# Patient Record
Sex: MALE | Race: WHITE | NOT HISPANIC OR LATINO | Employment: FULL TIME | URBAN - METROPOLITAN AREA
[De-identification: names, ages, dates, MRNs, and addresses within clinical notes are randomized per-mention and may not be internally consistent; named-entity substitution may affect disease eponyms.]

---

## 2017-06-02 DIAGNOSIS — Z01.812 PRE-OPERATIVE LABORATORY EXAMINATION: ICD-10-CM

## 2017-06-02 PROCEDURE — 87640 STAPH A DNA AMP PROBE: CPT | Mod: 59

## 2017-06-02 PROCEDURE — 87641 MR-STAPH DNA AMP PROBE: CPT

## 2017-06-02 RX ORDER — ALPRAZOLAM 0.25 MG/1
0.25 TABLET ORAL
COMMUNITY

## 2017-06-02 RX ORDER — SIMVASTATIN 40 MG
40 TABLET ORAL NIGHTLY
COMMUNITY
End: 2020-01-01

## 2017-06-02 RX ORDER — MELOXICAM 15 MG/1
15 TABLET ORAL DAILY
COMMUNITY
End: 2020-01-01

## 2017-06-02 RX ORDER — OMEPRAZOLE 20 MG/1
20 CAPSULE, DELAYED RELEASE ORAL DAILY
COMMUNITY
End: 2021-01-01

## 2017-06-02 RX ORDER — LISINOPRIL AND HYDROCHLOROTHIAZIDE 12.5; 1 MG/1; MG/1
1 TABLET ORAL DAILY
COMMUNITY
End: 2021-01-01

## 2017-06-02 RX ORDER — TRAMADOL HYDROCHLORIDE 50 MG/1
50 TABLET ORAL EVERY EVENING
Status: ON HOLD | COMMUNITY
End: 2020-01-01

## 2017-06-02 NOTE — DISCHARGE PLANNING
"DISCHARGE PLANNING NOTE - TOTAL JOINT     Procedure: Procedure(s):  HIP ARTHROPLASTY TOTAL  Procedure Date: 6/12/2017  Insurance:  Payor: MEDICARE / Plan: MEDICARE PART A & B / Product Type: *No Product type* /   Equipment currently available at home? front-wheel walker and raised toilet seat  Steps into the home? 2  Steps within the home? 0  Toilet height? Standard  Type of shower? walk-in shower  Who will be with you during your recovery? spouse  Is Outpatient Physical Therapy set up after surgery? Yes   Did you take the Total Joint Class and where? Yes, at [facility]     Plan: Met \"Raymon\" at pre-admit apt.  Has had other hip replaced, and did well.  He is working on choosing a PT facility, and will decide by 6/7/17.  Wife will be caregiver.  Has grab bars, and does not feel need for shower chair.  No further DME needs identified.     "

## 2017-06-03 LAB
SCCMEC + MECA PNL NOSE NAA+PROBE: NEGATIVE
SCCMEC + MECA PNL NOSE NAA+PROBE: POSITIVE

## 2017-06-12 ENCOUNTER — HOSPITAL ENCOUNTER (INPATIENT)
Facility: MEDICAL CENTER | Age: 77
LOS: 1 days | DRG: 470 | End: 2017-06-13
Attending: ORTHOPAEDIC SURGERY | Admitting: ORTHOPAEDIC SURGERY
Payer: COMMERCIAL

## 2017-06-12 ENCOUNTER — APPOINTMENT (OUTPATIENT)
Dept: RADIOLOGY | Facility: MEDICAL CENTER | Age: 77
DRG: 470 | End: 2017-06-12
Attending: ORTHOPAEDIC SURGERY
Payer: COMMERCIAL

## 2017-06-12 PROBLEM — M16.12 PRIMARY OSTEOARTHRITIS OF LEFT HIP: Status: ACTIVE | Noted: 2017-06-12

## 2017-06-12 PROCEDURE — 700111 HCHG RX REV CODE 636 W/ 250 OVERRIDE (IP)

## 2017-06-12 PROCEDURE — 700102 HCHG RX REV CODE 250 W/ 637 OVERRIDE(OP): Performed by: ORTHOPAEDIC SURGERY

## 2017-06-12 PROCEDURE — 700105 HCHG RX REV CODE 258: Performed by: ORTHOPAEDIC SURGERY

## 2017-06-12 PROCEDURE — 502578 HCHG PACK, TOTAL HIP: Performed by: ORTHOPAEDIC SURGERY

## 2017-06-12 PROCEDURE — 700112 HCHG RX REV CODE 229: Performed by: ORTHOPAEDIC SURGERY

## 2017-06-12 PROCEDURE — 160009 HCHG ANES TIME/MIN: Performed by: ORTHOPAEDIC SURGERY

## 2017-06-12 PROCEDURE — 160042 HCHG SURGERY MINUTES - EA ADDL 1 MIN LEVEL 5: Performed by: ORTHOPAEDIC SURGERY

## 2017-06-12 PROCEDURE — L1830 KO IMMOB CANVAS LONG PRE OTS: HCPCS | Performed by: ORTHOPAEDIC SURGERY

## 2017-06-12 PROCEDURE — 302135 SEQUENTIAL COMPRESSION MACHINE: Performed by: ORTHOPAEDIC SURGERY

## 2017-06-12 PROCEDURE — 700101 HCHG RX REV CODE 250

## 2017-06-12 PROCEDURE — 500087 HCHG BLADE, RECIPROCATING: Performed by: ORTHOPAEDIC SURGERY

## 2017-06-12 PROCEDURE — 501487 HCHG STRYKER TIP: Performed by: ORTHOPAEDIC SURGERY

## 2017-06-12 PROCEDURE — 501486 HCHG STRYKER IRRIG SET HC W/TUBING: Performed by: ORTHOPAEDIC SURGERY

## 2017-06-12 PROCEDURE — 770001 HCHG ROOM/CARE - MED/SURG/GYN PRIV*

## 2017-06-12 PROCEDURE — A9270 NON-COVERED ITEM OR SERVICE: HCPCS | Performed by: ORTHOPAEDIC SURGERY

## 2017-06-12 PROCEDURE — 72170 X-RAY EXAM OF PELVIS: CPT

## 2017-06-12 PROCEDURE — 160036 HCHG PACU - EA ADDL 30 MINS PHASE I: Performed by: ORTHOPAEDIC SURGERY

## 2017-06-12 PROCEDURE — 160031 HCHG SURGERY MINUTES - 1ST 30 MINS LEVEL 5: Performed by: ORTHOPAEDIC SURGERY

## 2017-06-12 PROCEDURE — 500864 HCHG NEEDLE, SPINAL 18G: Performed by: ORTHOPAEDIC SURGERY

## 2017-06-12 PROCEDURE — 502240 HCHG MISC OR SUPPLY RC 0272: Performed by: ORTHOPAEDIC SURGERY

## 2017-06-12 PROCEDURE — A9270 NON-COVERED ITEM OR SERVICE: HCPCS

## 2017-06-12 PROCEDURE — 0SRB02A REPLACEMENT OF LEFT HIP JOINT WITH METAL ON POLYETHYLENE SYNTHETIC SUBSTITUTE, UNCEMENTED, OPEN APPROACH: ICD-10-PCS | Performed by: ORTHOPAEDIC SURGERY

## 2017-06-12 PROCEDURE — 502000 HCHG MISC OR IMPLANTS RC 0278: Performed by: ORTHOPAEDIC SURGERY

## 2017-06-12 PROCEDURE — 160002 HCHG RECOVERY MINUTES (STAT): Performed by: ORTHOPAEDIC SURGERY

## 2017-06-12 PROCEDURE — 160048 HCHG OR STATISTICAL LEVEL 1-5: Performed by: ORTHOPAEDIC SURGERY

## 2017-06-12 PROCEDURE — A6223 GAUZE >16<=48 NO W/SAL W/O B: HCPCS | Performed by: ORTHOPAEDIC SURGERY

## 2017-06-12 PROCEDURE — 700102 HCHG RX REV CODE 250 W/ 637 OVERRIDE(OP)

## 2017-06-12 PROCEDURE — 94760 N-INVAS EAR/PLS OXIMETRY 1: CPT

## 2017-06-12 PROCEDURE — 160035 HCHG PACU - 1ST 60 MINS PHASE I: Performed by: ORTHOPAEDIC SURGERY

## 2017-06-12 PROCEDURE — 500811 HCHG LENS/HOOD FOR SPACESUIT: Performed by: ORTHOPAEDIC SURGERY

## 2017-06-12 PROCEDURE — 501838 HCHG SUTURE GENERAL: Performed by: ORTHOPAEDIC SURGERY

## 2017-06-12 PROCEDURE — 700111 HCHG RX REV CODE 636 W/ 250 OVERRIDE (IP): Performed by: ORTHOPAEDIC SURGERY

## 2017-06-12 DEVICE — IMPLANTABLE DEVICE: Type: IMPLANTABLE DEVICE | Site: HIP | Status: FUNCTIONAL

## 2017-06-12 RX ORDER — DOCUSATE SODIUM 100 MG/1
100 CAPSULE, LIQUID FILLED ORAL 2 TIMES DAILY
Status: DISCONTINUED | OUTPATIENT
Start: 2017-06-12 | End: 2017-06-13 | Stop reason: HOSPADM

## 2017-06-12 RX ORDER — ROPIVACAINE HYDROCHLORIDE 5 MG/ML
INJECTION, SOLUTION EPIDURAL; INFILTRATION; PERINEURAL
Status: DISCONTINUED | OUTPATIENT
Start: 2017-06-12 | End: 2017-06-12 | Stop reason: HOSPADM

## 2017-06-12 RX ORDER — LISINOPRIL AND HYDROCHLOROTHIAZIDE 12.5; 1 MG/1; MG/1
1 TABLET ORAL DAILY
Status: DISCONTINUED | OUTPATIENT
Start: 2017-06-12 | End: 2017-06-12

## 2017-06-12 RX ORDER — HALOPERIDOL 5 MG/ML
1 INJECTION INTRAMUSCULAR EVERY 6 HOURS PRN
Status: DISCONTINUED | OUTPATIENT
Start: 2017-06-12 | End: 2017-06-13 | Stop reason: HOSPADM

## 2017-06-12 RX ORDER — LISINOPRIL 5 MG/1
10 TABLET ORAL
Status: DISCONTINUED | OUTPATIENT
Start: 2017-06-12 | End: 2017-06-13 | Stop reason: HOSPADM

## 2017-06-12 RX ORDER — ACETAMINOPHEN 500 MG
TABLET ORAL
Status: COMPLETED
Start: 2017-06-12 | End: 2017-06-12

## 2017-06-12 RX ORDER — SODIUM CHLORIDE, SODIUM LACTATE, POTASSIUM CHLORIDE, CALCIUM CHLORIDE 600; 310; 30; 20 MG/100ML; MG/100ML; MG/100ML; MG/100ML
1000 INJECTION, SOLUTION INTRAVENOUS
Status: DISCONTINUED | OUTPATIENT
Start: 2017-06-12 | End: 2017-06-13 | Stop reason: HOSPADM

## 2017-06-12 RX ORDER — OXYCODONE HYDROCHLORIDE 10 MG/1
10 TABLET ORAL
Status: DISCONTINUED | OUTPATIENT
Start: 2017-06-12 | End: 2017-06-13 | Stop reason: HOSPADM

## 2017-06-12 RX ORDER — ONDANSETRON 2 MG/ML
4 INJECTION INTRAMUSCULAR; INTRAVENOUS EVERY 4 HOURS PRN
Status: DISCONTINUED | OUTPATIENT
Start: 2017-06-12 | End: 2017-06-13 | Stop reason: HOSPADM

## 2017-06-12 RX ORDER — DEXTROSE, SODIUM CHLORIDE, SODIUM LACTATE, POTASSIUM CHLORIDE, AND CALCIUM CHLORIDE 5; .6; .31; .03; .02 G/100ML; G/100ML; G/100ML; G/100ML; G/100ML
INJECTION, SOLUTION INTRAVENOUS CONTINUOUS
Status: DISCONTINUED | OUTPATIENT
Start: 2017-06-12 | End: 2017-06-13 | Stop reason: HOSPADM

## 2017-06-12 RX ORDER — HYDROCHLOROTHIAZIDE 12.5 MG/1
12.5 CAPSULE, GELATIN COATED ORAL
Status: DISCONTINUED | OUTPATIENT
Start: 2017-06-12 | End: 2017-06-13 | Stop reason: HOSPADM

## 2017-06-12 RX ORDER — OMEPRAZOLE 20 MG/1
20 CAPSULE, DELAYED RELEASE ORAL DAILY
Status: DISCONTINUED | OUTPATIENT
Start: 2017-06-12 | End: 2017-06-13 | Stop reason: HOSPADM

## 2017-06-12 RX ORDER — SIMVASTATIN 20 MG
40 TABLET ORAL NIGHTLY
Status: DISCONTINUED | OUTPATIENT
Start: 2017-06-12 | End: 2017-06-13 | Stop reason: HOSPADM

## 2017-06-12 RX ORDER — OXYCODONE HCL 5 MG/5 ML
SOLUTION, ORAL ORAL
Status: COMPLETED
Start: 2017-06-12 | End: 2017-06-12

## 2017-06-12 RX ORDER — DEXAMETHASONE SODIUM PHOSPHATE 4 MG/ML
4 INJECTION, SOLUTION INTRA-ARTICULAR; INTRALESIONAL; INTRAMUSCULAR; INTRAVENOUS; SOFT TISSUE
Status: DISCONTINUED | OUTPATIENT
Start: 2017-06-12 | End: 2017-06-13 | Stop reason: HOSPADM

## 2017-06-12 RX ORDER — AMOXICILLIN 250 MG
1 CAPSULE ORAL
Status: DISCONTINUED | OUTPATIENT
Start: 2017-06-12 | End: 2017-06-13 | Stop reason: HOSPADM

## 2017-06-12 RX ORDER — LIDOCAINE HYDROCHLORIDE 10 MG/ML
INJECTION, SOLUTION INFILTRATION; PERINEURAL
Status: COMPLETED
Start: 2017-06-12 | End: 2017-06-12

## 2017-06-12 RX ORDER — AMOXICILLIN 250 MG
1 CAPSULE ORAL NIGHTLY
Status: DISCONTINUED | OUTPATIENT
Start: 2017-06-12 | End: 2017-06-13 | Stop reason: HOSPADM

## 2017-06-12 RX ORDER — DIPHENHYDRAMINE HYDROCHLORIDE 50 MG/ML
25 INJECTION INTRAMUSCULAR; INTRAVENOUS EVERY 6 HOURS PRN
Status: DISCONTINUED | OUTPATIENT
Start: 2017-06-12 | End: 2017-06-13 | Stop reason: HOSPADM

## 2017-06-12 RX ORDER — POLYETHYLENE GLYCOL 3350 17 G/17G
1 POWDER, FOR SOLUTION ORAL 2 TIMES DAILY PRN
Status: DISCONTINUED | OUTPATIENT
Start: 2017-06-12 | End: 2017-06-13 | Stop reason: HOSPADM

## 2017-06-12 RX ORDER — MEPERIDINE HYDROCHLORIDE 25 MG/ML
INJECTION INTRAMUSCULAR; INTRAVENOUS; SUBCUTANEOUS
Status: COMPLETED
Start: 2017-06-12 | End: 2017-06-12

## 2017-06-12 RX ORDER — TRANEXAMIC ACID 100 MG/ML
INJECTION, SOLUTION INTRAVENOUS
Status: DISPENSED
Start: 2017-06-12 | End: 2017-06-13

## 2017-06-12 RX ORDER — ACETAMINOPHEN 500 MG
1000 TABLET ORAL EVERY 6 HOURS
Status: DISCONTINUED | OUTPATIENT
Start: 2017-06-12 | End: 2017-06-13 | Stop reason: HOSPADM

## 2017-06-12 RX ORDER — CELECOXIB 200 MG/1
CAPSULE ORAL
Status: COMPLETED
Start: 2017-06-12 | End: 2017-06-12

## 2017-06-12 RX ORDER — OXYCODONE HYDROCHLORIDE 5 MG/1
5 TABLET ORAL
Status: DISCONTINUED | OUTPATIENT
Start: 2017-06-12 | End: 2017-06-13 | Stop reason: HOSPADM

## 2017-06-12 RX ORDER — EPINEPHRINE 1 MG/ML(1)
AMPUL (ML) INJECTION
Status: DISCONTINUED | OUTPATIENT
Start: 2017-06-12 | End: 2017-06-12 | Stop reason: HOSPADM

## 2017-06-12 RX ORDER — CHOLECALCIFEROL (VITAMIN D3) 125 MCG
CAPSULE ORAL
COMMUNITY
End: 2021-01-01

## 2017-06-12 RX ORDER — SCOLOPAMINE TRANSDERMAL SYSTEM 1 MG/1
1 PATCH, EXTENDED RELEASE TRANSDERMAL
Status: DISCONTINUED | OUTPATIENT
Start: 2017-06-12 | End: 2017-06-13 | Stop reason: HOSPADM

## 2017-06-12 RX ORDER — BISACODYL 10 MG
10 SUPPOSITORY, RECTAL RECTAL
Status: DISCONTINUED | OUTPATIENT
Start: 2017-06-12 | End: 2017-06-13 | Stop reason: HOSPADM

## 2017-06-12 RX ORDER — ENEMA 19; 7 G/133ML; G/133ML
1 ENEMA RECTAL
Status: DISCONTINUED | OUTPATIENT
Start: 2017-06-12 | End: 2017-06-13 | Stop reason: HOSPADM

## 2017-06-12 RX ORDER — KETOROLAC TROMETHAMINE 30 MG/ML
15 INJECTION, SOLUTION INTRAMUSCULAR; INTRAVENOUS EVERY 6 HOURS
Status: DISCONTINUED | OUTPATIENT
Start: 2017-06-12 | End: 2017-06-13 | Stop reason: HOSPADM

## 2017-06-12 RX ORDER — KETOROLAC TROMETHAMINE 30 MG/ML
INJECTION, SOLUTION INTRAMUSCULAR; INTRAVENOUS
Status: DISCONTINUED | OUTPATIENT
Start: 2017-06-12 | End: 2017-06-12 | Stop reason: HOSPADM

## 2017-06-12 RX ORDER — ALPRAZOLAM 0.25 MG/1
0.25 TABLET ORAL NIGHTLY PRN
Status: DISCONTINUED | OUTPATIENT
Start: 2017-06-12 | End: 2017-06-13 | Stop reason: HOSPADM

## 2017-06-12 RX ORDER — CELECOXIB 200 MG/1
200 CAPSULE ORAL 2 TIMES DAILY WITH MEALS
Status: DISCONTINUED | OUTPATIENT
Start: 2017-06-13 | End: 2017-06-13 | Stop reason: HOSPADM

## 2017-06-12 RX ADMIN — MEPERIDINE HYDROCHLORIDE 12.5 MG: 25 INJECTION INTRAMUSCULAR; INTRAVENOUS; SUBCUTANEOUS at 15:55

## 2017-06-12 RX ADMIN — KETOROLAC TROMETHAMINE 15 MG: 30 INJECTION, SOLUTION INTRAMUSCULAR at 20:42

## 2017-06-12 RX ADMIN — FENTANYL CITRATE 25 MCG: 50 INJECTION, SOLUTION INTRAMUSCULAR; INTRAVENOUS at 16:25

## 2017-06-12 RX ADMIN — FENTANYL CITRATE 25 MCG: 50 INJECTION, SOLUTION INTRAMUSCULAR; INTRAVENOUS at 17:00

## 2017-06-12 RX ADMIN — LIDOCAINE HYDROCHLORIDE 0.1 ML: 10 INJECTION, SOLUTION INFILTRATION; PERINEURAL at 13:26

## 2017-06-12 RX ADMIN — SODIUM CHLORIDE 2 G: 9 INJECTION, SOLUTION INTRAVENOUS at 20:49

## 2017-06-12 RX ADMIN — ACETAMINOPHEN 1000 MG: 500 TABLET, COATED ORAL at 12:55

## 2017-06-12 RX ADMIN — Medication 1 TABLET: at 20:42

## 2017-06-12 RX ADMIN — LISINOPRIL 10 MG: 5 TABLET ORAL at 17:58

## 2017-06-12 RX ADMIN — SODIUM CHLORIDE, SODIUM LACTATE, POTASSIUM CHLORIDE, CALCIUM CHLORIDE AND DEXTROSE MONOHYDRATE: 5; 600; 310; 30; 20 INJECTION, SOLUTION INTRAVENOUS at 16:31

## 2017-06-12 RX ADMIN — CELECOXIB 400 MG: 200 CAPSULE ORAL at 12:55

## 2017-06-12 RX ADMIN — HYDROCHLOROTHIAZIDE 12.5 MG: 12.5 CAPSULE ORAL at 17:58

## 2017-06-12 RX ADMIN — SIMVASTATIN 40 MG: 20 TABLET, FILM COATED ORAL at 20:42

## 2017-06-12 RX ADMIN — FENTANYL CITRATE 25 MCG: 50 INJECTION, SOLUTION INTRAMUSCULAR; INTRAVENOUS at 16:15

## 2017-06-12 RX ADMIN — SODIUM CHLORIDE, SODIUM LACTATE, POTASSIUM CHLORIDE, CALCIUM CHLORIDE 1000 ML: 600; 310; 30; 20 INJECTION, SOLUTION INTRAVENOUS at 13:26

## 2017-06-12 RX ADMIN — OXYCODONE HYDROCHLORIDE 10 MG: 5 SOLUTION ORAL at 16:30

## 2017-06-12 RX ADMIN — FENTANYL CITRATE 25 MCG: 50 INJECTION, SOLUTION INTRAMUSCULAR; INTRAVENOUS at 16:35

## 2017-06-12 RX ADMIN — DOCUSATE SODIUM 100 MG: 100 CAPSULE, LIQUID FILLED ORAL at 20:42

## 2017-06-12 RX ADMIN — OMEPRAZOLE 20 MG: 20 CAPSULE, DELAYED RELEASE ORAL at 17:59

## 2017-06-12 ASSESSMENT — PAIN SCALES - GENERAL
PAINLEVEL_OUTOF10: 3
PAINLEVEL_OUTOF10: 4
PAINLEVEL_OUTOF10: 5
PAINLEVEL_OUTOF10: 5
PAINLEVEL_OUTOF10: 0
PAINLEVEL_OUTOF10: 0
PAINLEVEL_OUTOF10: 6
PAINLEVEL_OUTOF10: 3

## 2017-06-12 ASSESSMENT — LIFESTYLE VARIABLES
EVER FELT BAD OR GUILTY ABOUT YOUR DRINKING: NO
CONSUMPTION TOTAL: NEGATIVE
EVER_SMOKED: NEVER
DO YOU DRINK ALCOHOL: YES
HOW MANY TIMES IN THE PAST YEAR HAVE YOU HAD 5 OR MORE DRINKS IN A DAY: 0
HAVE PEOPLE ANNOYED YOU BY CRITICIZING YOUR DRINKING: NO
TOTAL SCORE: 0
AVERAGE NUMBER OF DAYS PER WEEK YOU HAVE A DRINK CONTAINING ALCOHOL: 7
HAVE YOU EVER FELT YOU SHOULD CUT DOWN ON YOUR DRINKING: NO
EVER_SMOKED: NEVER
TOTAL SCORE: 0
ON A TYPICAL DAY WHEN YOU DRINK ALCOHOL HOW MANY DRINKS DO YOU HAVE: 1
EVER HAD A DRINK FIRST THING IN THE MORNING TO STEADY YOUR NERVES TO GET RID OF A HANGOVER: NO
TOTAL SCORE: 0

## 2017-06-12 NOTE — IP AVS SNAPSHOT
6/13/2017    Adithya Cohn  [address] [place] [address]    Dear Adithya:    [facility] wants to ensure your discharge home is safe and you or your loved ones have had all of your questions answered regarding your care after you leave the hospital.    Below is a list of resources and contact information should you have any questions regarding your hospital stay, follow-up instructions, or active medical symptoms.    Questions or Concerns Regarding… Contact   Medical Questions Related to Your Discharge  (7 days a week, 8am-5pm) Contact a Nurse Care Coordinator   [phone]   Medical Questions Not Related to Your Discharge  (24 hours a day / 7 days a week)  Contact the Nurse Health Line   [phone]    Medications or Discharge Instructions Refer to your discharge packet   or contact your [facility] Primary Care Provider   [phone]   Follow-up Appointment(s) Schedule your appointment via [employer]   or contact Scheduling [phone]   Billing Review your statement via [employer]  or contact Billing [phone]   Medical Records Review your records via [employer]   or contact Medical Records [phone]     You may receive a telephone call within two days of discharge. This call is to make certain you understand your discharge instructions and have the opportunity to have any questions answered. You can also easily access your medical information, test results and upcoming appointments via the [employer] free online health management tool. You can learn more and sign up at [email or web address]. For assistance setting up your [employer] account, please call [phone].    Once again, we want to ensure your discharge home is safe and that you have a clear understanding of any next steps in your care. If you have any questions or concerns, please do not hesitate to contact us, we are here for you. Thank you for choosing [facility] for your healthcare needs.    Sincerely,    Your [facility] Healthcare Team

## 2017-06-12 NOTE — OR NURSING
1542 received from or  resp spont  Left hip dressing c/d/i  Dp2+  Good movement  Ice pack applied  Medicated for pain prn 1705 pain tolerable per pt  Meets discharge criteria

## 2017-06-12 NOTE — IP AVS SNAPSHOT
[employer] Access Code: [email or web address]  Expires: 7/2/2017  4:17 AM    Your email address is not on file at [facility].  Email Addresses are required for you to sign up for [employer], please contact [phone] to verify your personal information and to provide your email address prior to attempting to register for [employer].    Adithya Duvalant  [address] [place] [address]    [employer]  A secure, online tool to manage your health information     [facility]’s [employer]® is a secure, online tool that connects you to your personalized health information from the privacy of your home -- day or night - making it very easy for you to manage your healthcare. Once the activation process is completed, you can even access your medical information using the [employer] tex, which is available for free in the Apple Tex store or Google Play store.     To learn more about [employer], visit [email or web address]    There are two levels of access available (as shown below):   My Chart Features  [facility] Primary Care Doctor [facility]  Specialists [facility]  Urgent  Care Non-[facility] Primary Care Doctor   Email your healthcare team securely and privately 24/7 X X X    Manage appointments: schedule your next appointment; view details of past/upcoming appointments X      Request prescription refills. X      View recent personal medical records, including lab and immunizations X X X X   View health record, including health history, allergies, medications X X X X   Read reports about your outpatient visits, procedures, consult and ER notes X X X X   See your discharge summary, which is a recap of your hospital and/or ER visit that includes your diagnosis, lab results, and care plan X X  X     How to register for [employer]:  Once your e-mail address has been verified, follow the following steps to sign up for [employer].     1. Go to  [email or web address]  2. Click on the Sign Up Now box, which takes you to the New Member Sign Up page.  You will need to provide the following information:  a. Enter your [employer] Access Code exactly as it appears at the top of this page. (You will not need to use this code after you’ve completed the sign-up process. If you do not sign up before the expiration date, you must request a new code.)   b. Enter your date of birth.   c. Enter your home email address.   d. Click Submit, and follow the next screen’s instructions.  3. Create a Informed [employer] ID. This will be your Diurnal login ID and cannot be changed, so think of one that is secure and easy to remember.  4. Create a Diurnal password. You can change your password at any time.  5. Enter your Password Reset Question and Answer. This can be used at a later time if you forget your password.   6. Enter your e-mail address. This allows you to receive e-mail notifications when new information is available in [employer].  7. Click Sign Up. You can now view your health information.    For assistance activating your [employer] account, call [phone]

## 2017-06-12 NOTE — IP AVS SNAPSHOT
" Home Care Instructions                                                                                                                  Name:Adithya Cohn  Medical Record Number:[id number]  CSN: [id number]    YOB: 1940   Age: 76 y.o.  Sex: male  HT:1.854 m (6' 0.99\") WT: 87.1 kg (192 lb 0.3 oz)          Admit Date: 6/12/2017     Discharge Date:   Today's Date: 6/13/2017  Attending Doctor:  Shay Lafleur M.D.                  Allergies:  Review of patient's allergies indicates no known allergies.            Discharge Instructions       - Follow up w/ Dr. Lafleur in 2 weeks for a wound check and staple removal.    - Wear knee immobilizer in bed when sleeping.    - Outpatient physical therapy to begin this week  - Weightbearing as tolerated on the left hip.   - May shower with the incision covered, but the wound should be kept clean and dry.  - Notify office if there are problems with increasing pain, drainage, evidence of infection, neurovascular compromise or other significant concern.    - Take an aspirin twice daily 325 mg for DVT prophylaxis  - Continue with normal preoperative home medications.    Discharge Instructions    Discharged to home by car with relative. Discharged via wheelchair, hospital escort: Yes.  Special equipment needed: Not Applicable    Be sure to schedule a follow-up appointment with your primary care doctor or any specialists as instructed.     Discharge Plan:   Influenza Vaccine Indication: Patient Refuses    I understand that a diet low in cholesterol, fat, and sodium is recommended for good health. Unless I have been given specific instructions below for another diet, I accept this instruction as my diet prescription.   Other diet: Regular    Special Instructions: Discharge instructions for the Orthopedic Patient    Follow up with Primary Care Physician within 2 weeks of discharge to home, regarding:  Review of medications and diagnostic testing.  Surveillance for " medical complications.  Workup and treatment of osteoporosis, if appropriate.     -Is this a Joint Replacement patient? Yes   Total Joint Hip Replacement Discharge Instructions    Pain  - The goal is to slowly wean off the prescription pain medicine.  - Ice can be used for pain control.  20 minutes at a time is recommended, and never directly against your skin or incision.  - Most patients are off the pain pills by 3 weeks; others may require a low level of pain medications for many months. If your pain continues to be severe, follow up with your physician.  Infection  Deep hip joint infections that require removal of the prostheses occur in less than 0.1% of patients. Lesser infections in the skin (cellulites) are more common and much more easily treated.  - Keep the incision as clean and dry as possible.  - Always wash your hands before touching your incision.  - Skin infections tend to develop around 7-10 days after surgery, most can be treated with oral antibiotics.  - Dental Care should be delayed for 3 months after surgery, your surgeon recommends taking a dose of antibiotics 1 hour prior to any dental procedure.  After 2 years, most surgeons recommend antibiotics only before an extensive procedure.  Ask your surgeon what he recommends.  - Signs and symptoms of infection can include:  low grade fever, redness, pain, swelling and drainage from your incision.  Notify your surgeon immediately if you develop any of these symptoms.  Post op Disturbances  - Bowel habits - constipation is extremely common and is caused by a combination of anesthesia, lack of mobility and pain medicine.  Use stool softeners or laxatives if necessary. It is important not to ignore this problem, as bowel obstructions can be a serious complication after joint replacement surgery.  - Mood/Energy Level - Many patients experience a lack of energy and endurance for up to 2-3 months after surgery.  Some may also feel down and can even become  depressed.  This is likely due to the postoperative anemia, change in activity level, lack of sleep, pain medicine and just the emotional reaction to the surgery itself that is a big disruption in a person’s life.  This usually passes.  If symptoms persist, follow up with your primary physician.  - Returning to work - Your surgeon will give you more specific instructions.  Generally, if you work a sedentary job requiring little standing or walking, most patients may return within 2-6 weeks.  Manual labor jobs involving walking, lifting and standing may take 3-4 months.  Your surgeon’s office can provide a release to part-time or light duty work early on in your recovery and progress you to full duty as able.  - Driving - You can begin driving an automatic shift car in 4 to 8 weeks, provided you are no longer taking narcotic pain medication. If you have a stick-shift car and your right hip was replaced, do not begin driving until your doctor says you can.   - Avoiding falls -  throw rugs and tack down loose carpeting.  Be aware of floor hazards such as pets, small objects or uneven surfaces.   -  Airport Metal Detectors - The sensitivity of metal detectors varies and it is likely that your prosthesis will cause an alarm. Inform the  that you have an artificial joint.  Diet  - Resume your normal diet as tolerated.  - It is important to achieve a healthy nutritional status by eating a well balanced diet on a regular basis.  - Your physician may recommend that you take iron and vitamin supplements.   - Continue to drink plenty of fluids.  Shower/Bathing  - You may shower as soon as you get home from the hospital unless otherwise instructed.  - Keep your incision out of water.  To keep the incision dry when showering, cover it with a plastic bag or plastic wrap.  - Pat incision dry if it gets wet.  Don’t rub.  - Do not submerge in a bath until staples are out and the incision is completely healed.  (Approximately 6-8 weeks after surgery).  Dressing Change:  Procedure (if recommended by your physician)  - Wash hands.  - Open all dressing change materials.  - Remove old dressing and discard.  - Inspect incision for redness, increase in clear drainage, yellow/green drainage, odor and surrounding skin hot to touch.  -  ABD (large gauze) pad by one corner and lay over the incision.  Be careful not to touch the inside of the dressing that will lay over the incision.  - Secure in place as instructed (Ace wrap or tape).    Swelling/Bruising  - Swelling is normal after hip replacement and can involve the thigh, knee, calf and foot.  - Swelling can last from 3-6 months.  - Elevate your leg higher than your heart while reclining.  The first week you are home you should elevate your leg an equal amount of time, as you are active.    - Anti-inflammatory pills can be taken once you have stopped the blood thinners.  - The swelling is usually worse after you go home since you are upright for longer periods of time.  - Bruising is common and can involve the entire leg including the thigh, calf and even foot.  Bruising often does not appear until after you arrive home and it can be quite dramatic- purple, black, green.  The bruising you can see is not usually concerning and will subside without any treatment.      Blood Clot Prevention  Blood clots in the legs and the less common, but frightening, clots that travel to the lungs are a real focus of our preventative. Most patients are at standard risk for them, but those patients who are at higher risk include people who have had previous clots, a family history of clotting, smoking, diabetes, obesity, advanced age, use of estrogen and a sedentary lifestyle.    - Signs of blood clots in legs - Swelling in thigh, calf or ankle that does not go down with elevation.  Pain, heat and tenderness in calf, back of calf or groin area.  NOTE: blood clots can occur in either  leg.  - You have been receiving anticoagulant therapy (blood thinners) in the hospital and you may be instructed to continue at home depending on your risk factors.  - Your risk for developing a clot continues for up to 2-3 months after surgery.  You should avoid prolonged sitting and dehydration during that time (long air trips and car trips).  If you do take a trip during this time, please get up and move around every 1- 1.5 hours.  - If you are prescribed blood thinning medication for home, follow instructions as directed. (Handouts provided if applicable).      Activity    Once you get home, you should stay active. The key is not to overdo it! While you can expect some good days and some bad days, you should notice a gradual improvement over time you should notice a gradual improvement and a gradual increase in your endurance over the next 6 to 12 months.    - Weight Bearing - If you have undergone cemented or hybrid hip replacement, you can put some weight on the leg immediately using a cane or walker, and you should continue to use some support for 4 to 6 weeks to help the muscles recover.   - Sleeping Positions - Sleep on your back with your legs slightly apart or on your side with a regular pillow between your knees. Be sure to use the pillow for at least 6 weeks, or until your doctor says you can do without it. Sleeping on your stomach should be all right  - Sitting - For at least the first 3 months, sit only in chairs that have arms. Do not sit on low chairs, low stools, or reclining chairs. Do not cross your legs at the knees. The physical therapist will show you how to sit and stand from a chair, keeping your affected leg out in front of you. Get up and move around on a regular basis--at least once every hour.  - Walking - Walk as much as you like once your doctor gives you the go-ahead, but remember that walking is no substitute for your prescribed exercises. Walking with a pair of trekking poles is  helpful and adds as much as 40% to the exercise you get when you walk  - Therapy may be needed in some cases, to strengthen your muscles and improve your gait (walking pattern).  This decision will be made at your post-operative appointment.  Follow your therapist recommended post-operative exercises (handout provided by Therapist).  - Swimming is also recommended; you can begin as soon as the sutures have been removed and the wound is healed, approximately 6 to 8 weeks after surgery. Using a pair of training fins may make swimming a more enjoyable and effective exercise.  - Other activities - Lower impact activities are preferred.  If you have specific questions, consult your Surgeon.    - Sexual activity - Your surgeon can tell you when it should be safe to resume sexual activity.      When to Call the Doctor   Call the physician if:   - Fever over 100.5? F  - Increased pain, drainage, redness, odor or heat around the incision area  - Shaking chills  - Increased knee pain with activity and rest  - Increased pain in calf, tenderness or redness above or below the knee  - Increased swelling of calf, ankle, foot  - Sudden increased shortness of breath, sudden onset of chest pain, localized chest pain with coughing  - Incision opening  Or, if there are any questions or concerns about medications or care.       -Is this patient being discharged with medication to prevent blood clots?  Yes, Aspirin Aspirin, ASA oral tablets  What is this medicine?  ASPIRIN (AS pir in) is a pain reliever. It is used to treat mild pain and fever. This medicine is also used as directed by a doctor to prevent and to treat heart attacks, to prevent strokes, and to treat arthritis or inflammation.  This medicine may be used for other purposes; ask your health care provider or pharmacist if you have questions.  COMMON BRAND NAME(S): Aspir-Low, Aspir-Rachel , Aspirtab , [employer] Advanced Aspirin, [employer] Aspirin Extra Strength, [employer] Aspirin Plus,  Blair Aspirin, Blair Genuine Aspirin, Blair Womens Aspirin , Bufferin Extra Strength, Bufferin Low Dose, Bufferin  What should I tell my health care provider before I take this medicine?  They need to know if you have any of these conditions:  -anemia  -asthma  -bleeding problems  -child with chickenpox, the flu, or other viral infection  -diabetes  -gout  -if you frequently drink alcohol containing drinks  -kidney disease  -liver disease  -low level of vitamin K  -lupus  -smoke tobacco  -stomach ulcers or other problems  -an unusual or allergic reaction to aspirin, tartrazine dye, other medicines, dyes, or preservatives  -pregnant or trying to get pregnant  -breast-feeding  How should I use this medicine?  Take this medicine by mouth with a glass of water. Follow the directions on the package or prescription label. You can take this medicine with or without food. If it upsets your stomach, take it with food. Do not take your medicine more often than directed.  Talk to your pediatrician regarding the use of this medicine in children. While this drug may be prescribed for children as young as 12 years of age for selected conditions, precautions do apply. Children and teenagers should not use this medicine to treat chicken pox or flu symptoms unless directed by a doctor.  Patients over 65 years old may have a stronger reaction and need a smaller dose.  Overdosage: If you think you have taken too much of this medicine contact a poison control center or emergency room at once.  NOTE: This medicine is only for you. Do not share this medicine with others.  What if I miss a dose?  If you are taking this medicine on a regular schedule and miss a dose, take it as soon as you can. If it is almost time for your next dose, take only that dose. Do not take double or extra doses.  What may interact with this medicine?  Do not take this medicine with any of the following medications:  -cidofovir  -ketorolac  -probenecid  This  medicine may also interact with the following medications:  -alcohol  -alendronate  -bismuth subsalicylate  -flavocoxid  -herbal supplements like feverfew, garlic, sharmaine, ginkgo biloba, horse chestnut  -medicines for diabetes or glaucoma like acetazolamide, methazolamide  -medicines for gout  -medicines that treat or prevent blood clots like enoxaparin, heparin, ticlopidine, warfarin  -other aspirin and aspirin-like medicines  -NSAIDs, medicines for pain and inflammation, like ibuprofen or naproxen  -pemetrexed  -sulfinpyrazone  -varicella live vaccine  This list may not describe all possible interactions. Give your health care provider a list of all the medicines, herbs, non-prescription drugs, or dietary supplements you use. Also tell them if you smoke, drink alcohol, or use illegal drugs. Some items may interact with your medicine.  What should I watch for while using this medicine?  If you are treating yourself for pain, tell your doctor or health care professional if the pain lasts more than 10 days, if it gets worse, or if there is a new or different kind of pain. Tell your doctor if you see redness or swelling. Also, check with your doctor if you have a fever that lasts for more than 3 days. Only take this medicine to prevent heart attacks or blood clotting if prescribed by your doctor or health care professional.  Do not take aspirin or aspirin-like medicines with this medicine. Too much aspirin can be dangerous. Always read the labels carefully.  This medicine can irritate your stomach or cause bleeding problems. Do not smoke cigarettes or drink alcohol while taking this medicine. Do not lie down for 30 minutes after taking this medicine to prevent irritation to your throat.  If you are scheduled for any medical or dental procedure, tell your healthcare provider that you are taking this medicine. You may need to stop taking this medicine before the procedure.  What side effects may I notice from receiving  this medicine?  Side effects that you should report to your doctor or health care professional as soon as possible:  -allergic reactions like skin rash, itching or hives, swelling of the face, lips, or tongue  -black, tarry stools  -bloody, coffee ground-like vomit  -breathing problems  -changes in hearing, ringing in the ears  -confusion  -general ill feeling or flu-like symptoms  -pain on swallowing  -redness, blistering, peeling or loosening of the skin, including inside the mouth or nose  -trouble passing urine or change in the amount of urine  -unusual bleeding or bruising  -unusually weak or tired  -yellowing of the eyes or skin  Side effects that usually do not require medical attention (report to your doctor or health care professional if they continue or are bothersome):  -diarrhea or constipation  -nausea, vomiting  -stomach gas, heartburn  This list may not describe all possible side effects. Call your doctor for medical advice about side effects. You may report side effects to FDA at 9-044-FDA-5412.  Where should I keep my medicine?  Keep out of the reach of children.  Store at room temperature between 15 and 30 degrees C (59 and 86 degrees F). Protect from heat and moisture. Do not use this medicine if it has a strong vinegar smell. Throw away any unused medicine after the expiration date.  NOTE: This sheet is a summary. It may not cover all possible information. If you have questions about this medicine, talk to your doctor, pharmacist, or health care provider.  © 2014, [employer]/Gold [employer]. (3/10/2009 10:44:17 AM)      · Is patient discharged on Warfarin / Coumadin?   No     · Is patient Post Blood Transfusion?  No    Depression / Suicide Risk    As you are discharged from this [facility] facility, it is important to learn how to keep safe from harming yourself.    Recognize the warning signs:  · Abrupt changes in personality, positive or negative- including increase in energy   · Giving away  possessions  · Change in eating patterns- significant weight changes-  positive or negative  · Change in sleeping patterns- unable to sleep or sleeping all the time   · Unwillingness or inability to communicate  · Depression  · Unusual sadness, discouragement and loneliness  · Talk of wanting to die  · Neglect of personal appearance   · Rebelliousness- reckless behavior  · Withdrawal from people/activities they love  · Confusion- inability to concentrate     If you or a loved one observes any of these behaviors or has concerns about self-harm, here's what you can do:  · Talk about it- your feelings and reasons for harming yourself  · Remove any means that you might use to hurt yourself (examples: pills, rope, extension cords, firearm)  · Get professional help from the community (Mental Health, Substance Abuse, psychological counseling)  · Do not be alone:Call your Safe Contact- someone whom you trust who will be there for you.  · Call your local CRISIS HOTLINE 887-8714 or 419-327-6434  · Call your local Children's Mobile Crisis Response Team [place] (013) 967-9868 or wwwMiTu Network  · Call the toll free National Suicide Prevention Hotlines   · National Suicide Prevention Lifeline 949-756-CPLN (1661)  · National Hope Line Network 800-SUICIDE (402-5470)        Follow-up Information     1. Follow up with Shay Lafleur M.D. In 2 weeks.    Specialty:  Orthopaedics    Contact information    [address]  [address]  [place] [address]  [phone]           Discharge Medication Instructions:    Below are the medications your physician expects you to take upon discharge:    Review all your home medications and newly ordered medications with your doctor and/or pharmacist. Follow medication instructions as directed by your doctor and/or pharmacist.    Please keep your medication list with you and share with your physician.               Medication List      START taking these medications         Instructions    Morning Afternoon Evening Bedtime    oxycodone-acetaminophen  MG Tabs   Commonly known as:  PERCOCET-10        Take 0.5-1 Tabs by mouth every four hours as needed.   Dose:  0.5-1 Tab                          CONTINUE taking these medications        Instructions    Morning Afternoon Evening Bedtime    alprazolam 0.25 MG Tabs   Commonly known as:  XANAX        Take 0.25 mg by mouth at bedtime as needed for Sleep.   Dose:  0.25 mg                        lisinopril-hydrochlorothiazide 10-12.5 MG per tablet   Commonly known as:  PRINZIDE, ZESTORETIC        Take 1 Tab by mouth every day.   Dose:  1 Tab                        Melatonin 5 MG Tabs        Take  by mouth. Indications: Trouble Sleeping                        meloxicam 15 MG tablet   Commonly known as:  MOBIC        Take 15 mg by mouth every day.   Dose:  15 mg                        omeprazole 20 MG delayed-release capsule   Last time this was given:  20 mg on 6/13/2017  8:29 AM   Commonly known as:  PRILOSEC        Take 20 mg by mouth every day.   Dose:  20 mg                        simvastatin 40 MG Tabs   Last time this was given:  40 mg on 6/12/2017  8:42 PM   Commonly known as:  ZOCOR        Take 40 mg by mouth every evening.   Dose:  40 mg                        tramadol 50 MG Tabs   Commonly known as:  ULTRAM        Take 50 mg by mouth every evening.   Dose:  50 mg                             Where to Get Your Medications      You can get these medications from any pharmacy     Bring a paper prescription for each of these medications    - oxycodone-acetaminophen  MG Tabs            Instructions           Diet / Nutrition:    Follow any diet instructions given to you by your doctor or the dietician, including how much salt (sodium) you are allowed each day.    If you are overweight, talk to your doctor about a weight reduction plan.    Activity:    Remain physically active following your doctor's instructions about exercise and  activity.    Rest often.     Any time you become even a little tired or short of breath, SIT DOWN and rest.    Worsening Symptoms:    Report any of the following signs and symptoms to the doctor's office immediately:    *Pain of jaw, arm, or neck  *Chest pain not relieved by medication                               *Dizziness or loss of consciousness  *Difficulty breathing even when at rest   *More tired than usual                                       *Bleeding drainage or swelling of surgical site  *Swelling of feet, ankles, legs or stomach                 *Fever (>100ºF)  *Pink or blood tinged sputum  *Weight gain (3lbs/day or 5lbs /week)           *Shock from internal defibrillator (if applicable)  *Palpitations or irregular heartbeats                *Cool and/or numb extremities    Stroke Awareness    Common Risk Factors for Stroke include:    Age  Atrial Fibrillation  Carotid Artery Stenosis  Diabetes Mellitus  Excessive alcohol consumption  High blood pressure  Overweight   Physical inactivity  Smoking    Warning signs and symptoms of a stroke include:    *Sudden numbness or weakness of the face, arm or leg (especially on one side of the body).  *Sudden confusion, trouble speaking or understanding.  *Sudden trouble seeing in one or both eyes.  *Sudden trouble walking, dizziness, loss of balance or coordination.Sudden severe headache with no known cause.    It is very important to get treatment quickly when a stroke occurs. If you experience any of the above warning signs, call 911 immediately.                   Disclaimer         Quit Smoking / Tobacco Use:    I understand the use of any tobacco products increases my chance of suffering from future heart disease or stroke and could cause other illnesses which may shorten my life. Quitting the use of tobacco products is the single most important thing I can do to improve my health. For further information on smoking / tobacco cessation call a Toll Free Quit  Line at 1-458.774.6441 (*National Cancer [place]) or 1-978.286.1965 (American Lung Association) or you can access the web based program at www.lungusa.org.    [place] Tobacco Users Help Line:  (671) 350-4224       Toll Free: 1-289.550.8180  Quit Tobacco Program [facility] (046)003-7995    Crisis Hotline:    [place] Crisis Hotline:  4-269-RDLOAJJ or 1-350.390.4856    [place] Crisis Hotline:    1-367.947.4095 or 170-043-5094    Discharge Survey:   Thank you for choosing [facility]. We hope we did everything we could to make your hospital stay a pleasant one. You may be receiving a phone survey and we would appreciate your time and participation in answering the questions. Your input is very valuable to us in our efforts to improve our service to our patients and their families.        My signature on this form indicates that:    1. I have reviewed and understand the above information.  2. My questions regarding this information have been answered to my satisfaction.  3. I have formulated a plan with my discharge nurse to obtain my prescribed medications for home.                  Disclaimer         __________________________________                     __________       ________                       Patient Signature                                                 Date                    Time

## 2017-06-12 NOTE — IP AVS SNAPSHOT
" <p align=\"LEFT\"><IMG SRC=\"[email or web address]\" alt=\"Image\" WIDTH=\"50%\" HEIGHT=\"200\" BORDER=\"\"></p>                   Name:Adithya Cohn  Medical Record Number:[id number]  CSN: [id number]    YOB: 1940   Age: 76 y.o.  Sex: male  HT:1.854 m (6' 0.99\") WT: 87.1 kg (192 lb 0.3 oz)          Admit Date: 6/12/2017     Discharge Date:   Today's Date: 6/13/2017  Attending Doctor:  Shay Lafleur M.D.                  Allergies:  Review of patient's allergies indicates no known allergies.          Follow-up Information     1. Follow up with Shay Lafleur M.D. In 2 weeks.    Specialty:  Orthopaedics    Contact information    [address]  [address]  [facility] [id number] [phone]           Medication List      Take these Medications        Instructions    alprazolam 0.25 MG Tabs   Commonly known as:  XANAX    Take 0.25 mg by mouth at bedtime as needed for Sleep.   Dose:  0.25 mg       lisinopril-hydrochlorothiazide 10-12.5 MG per tablet   Commonly known as:  PRINZIDE, ZESTORETIC    Take 1 Tab by mouth every day.   Dose:  1 Tab       Melatonin 5 MG Tabs    Take  by mouth. Indications: Trouble Sleeping       meloxicam 15 MG tablet   Commonly known as:  MOBIC    Take 15 mg by mouth every day.   Dose:  15 mg       omeprazole 20 MG delayed-release capsule   Commonly known as:  PRILOSEC    Take 20 mg by mouth every day.   Dose:  20 mg       oxycodone-acetaminophen  MG Tabs   Commonly known as:  PERCOCET-10    Take 0.5-1 Tabs by mouth every four hours as needed.   Dose:  0.5-1 Tab       simvastatin 40 MG Tabs   Commonly known as:  ZOCOR    Take 40 mg by mouth every evening.   Dose:  40 mg       tramadol 50 MG Tabs   Commonly known as:  ULTRAM    Take 50 mg by mouth every evening.   Dose:  50 mg         "

## 2017-06-12 NOTE — OR SURGEON
Immediate Post-Operative Note      PreOp Diagnosis: Severe OA left hip    PostOp Diagnosis: same    Procedure(s):  HIP ARTHROPLASTY TOTAL - Wound Class: Clean    Surgeon(s):   WILL Andersen    Anesthesiologist/Type of Anesthesia:  Anesthesiologist: Diogenes Kumar M.D.  Anesthesia Technician: Jenny Castellanos/General    Surgical Staff:  Assistant: Alfonso Mendoza C.N.ASalvador  Circulator: Adriana Ge R.N.  Limb Mckay: Jenny Castellanos  Scrub Person: Rogerio Beebe    Specimen: femoral head    Estimated Blood Loss: 100 cc    Findings: OA    Complications: none        6/12/2017 3:39 PM Shay Lafleur

## 2017-06-13 VITALS
WEIGHT: 192.02 LBS | BODY MASS INDEX: 25.45 KG/M2 | SYSTOLIC BLOOD PRESSURE: 124 MMHG | RESPIRATION RATE: 18 BRPM | HEIGHT: 73 IN | TEMPERATURE: 98.6 F | DIASTOLIC BLOOD PRESSURE: 89 MMHG | HEART RATE: 74 BPM | OXYGEN SATURATION: 93 %

## 2017-06-13 LAB
ANION GAP SERPL CALC-SCNC: 6 MMOL/L (ref 0–11.9)
BUN SERPL-MCNC: 22 MG/DL (ref 8–22)
CALCIUM SERPL-MCNC: 9.1 MG/DL (ref 8.4–10.2)
CHLORIDE SERPL-SCNC: 106 MMOL/L (ref 96–112)
CO2 SERPL-SCNC: 26 MMOL/L (ref 20–33)
CREAT SERPL-MCNC: 0.96 MG/DL (ref 0.5–1.4)
ERYTHROCYTE [DISTWIDTH] IN BLOOD BY AUTOMATED COUNT: 43.8 FL (ref 35.9–50)
GFR SERPL CREATININE-BSD FRML MDRD: >60 ML/MIN/1.73 M 2
GLUCOSE SERPL-MCNC: 134 MG/DL (ref 65–99)
HCT VFR BLD AUTO: 36.9 % (ref 42–52)
HGB BLD-MCNC: 13.3 G/DL (ref 14–18)
MCH RBC QN AUTO: 34.9 PG (ref 27–33)
MCHC RBC AUTO-ENTMCNC: 36 G/DL (ref 33.7–35.3)
MCV RBC AUTO: 96.9 FL (ref 81.4–97.8)
PLATELET # BLD AUTO: 199 K/UL (ref 164–446)
PMV BLD AUTO: 11.3 FL (ref 9–12.9)
POTASSIUM SERPL-SCNC: 3.9 MMOL/L (ref 3.6–5.5)
RBC # BLD AUTO: 3.81 M/UL (ref 4.7–6.1)
SODIUM SERPL-SCNC: 138 MMOL/L (ref 135–145)
WBC # BLD AUTO: 12.5 K/UL (ref 4.8–10.8)

## 2017-06-13 PROCEDURE — 85027 COMPLETE CBC AUTOMATED: CPT

## 2017-06-13 PROCEDURE — G8989 SELF CARE D/C STATUS: HCPCS | Mod: CJ

## 2017-06-13 PROCEDURE — 97535 SELF CARE MNGMENT TRAINING: CPT

## 2017-06-13 PROCEDURE — G8979 MOBILITY GOAL STATUS: HCPCS | Mod: CJ

## 2017-06-13 PROCEDURE — 97165 OT EVAL LOW COMPLEX 30 MIN: CPT

## 2017-06-13 PROCEDURE — A9270 NON-COVERED ITEM OR SERVICE: HCPCS | Performed by: ORTHOPAEDIC SURGERY

## 2017-06-13 PROCEDURE — 97161 PT EVAL LOW COMPLEX 20 MIN: CPT

## 2017-06-13 PROCEDURE — 700105 HCHG RX REV CODE 258: Performed by: ORTHOPAEDIC SURGERY

## 2017-06-13 PROCEDURE — G8978 MOBILITY CURRENT STATUS: HCPCS | Mod: CK

## 2017-06-13 PROCEDURE — 36415 COLL VENOUS BLD VENIPUNCTURE: CPT

## 2017-06-13 PROCEDURE — 700111 HCHG RX REV CODE 636 W/ 250 OVERRIDE (IP): Performed by: ORTHOPAEDIC SURGERY

## 2017-06-13 PROCEDURE — G8980 MOBILITY D/C STATUS: HCPCS | Mod: CJ

## 2017-06-13 PROCEDURE — 80048 BASIC METABOLIC PNL TOTAL CA: CPT

## 2017-06-13 PROCEDURE — 700112 HCHG RX REV CODE 229: Performed by: ORTHOPAEDIC SURGERY

## 2017-06-13 PROCEDURE — G8988 SELF CARE GOAL STATUS: HCPCS | Mod: CJ

## 2017-06-13 PROCEDURE — G8987 SELF CARE CURRENT STATUS: HCPCS | Mod: CJ

## 2017-06-13 PROCEDURE — 700102 HCHG RX REV CODE 250 W/ 637 OVERRIDE(OP): Performed by: ORTHOPAEDIC SURGERY

## 2017-06-13 RX ORDER — OXYCODONE AND ACETAMINOPHEN 10; 325 MG/1; MG/1
.5-1 TABLET ORAL EVERY 4 HOURS PRN
Qty: 40 TAB | Refills: 0 | Status: ON HOLD | OUTPATIENT
Start: 2017-06-13 | End: 2020-01-01

## 2017-06-13 RX ADMIN — ASPIRIN 325 MG: 325 TABLET, DELAYED RELEASE ORAL at 05:49

## 2017-06-13 RX ADMIN — KETOROLAC TROMETHAMINE 15 MG: 30 INJECTION, SOLUTION INTRAMUSCULAR at 05:48

## 2017-06-13 RX ADMIN — DOCUSATE SODIUM 100 MG: 100 CAPSULE, LIQUID FILLED ORAL at 08:29

## 2017-06-13 RX ADMIN — ACETAMINOPHEN 1000 MG: 500 TABLET ORAL at 00:29

## 2017-06-13 RX ADMIN — OXYCODONE HYDROCHLORIDE 5 MG: 5 TABLET ORAL at 05:51

## 2017-06-13 RX ADMIN — OXYCODONE HYDROCHLORIDE 5 MG: 5 TABLET ORAL at 00:30

## 2017-06-13 RX ADMIN — LISINOPRIL 10 MG: 5 TABLET ORAL at 08:29

## 2017-06-13 RX ADMIN — ACETAMINOPHEN 1000 MG: 500 TABLET ORAL at 05:49

## 2017-06-13 RX ADMIN — OMEPRAZOLE 20 MG: 20 CAPSULE, DELAYED RELEASE ORAL at 08:29

## 2017-06-13 RX ADMIN — HYDROCHLOROTHIAZIDE 12.5 MG: 12.5 CAPSULE ORAL at 08:29

## 2017-06-13 RX ADMIN — SODIUM CHLORIDE 2 G: 9 INJECTION, SOLUTION INTRAVENOUS at 05:49

## 2017-06-13 RX ADMIN — SODIUM CHLORIDE, SODIUM LACTATE, POTASSIUM CHLORIDE, CALCIUM CHLORIDE AND DEXTROSE MONOHYDRATE: 5; 600; 310; 30; 20 INJECTION, SOLUTION INTRAVENOUS at 00:31

## 2017-06-13 RX ADMIN — OXYCODONE HYDROCHLORIDE 5 MG: 5 TABLET ORAL at 10:25

## 2017-06-13 ASSESSMENT — PAIN SCALES - GENERAL
PAINLEVEL_OUTOF10: 2
PAINLEVEL_OUTOF10: 4
PAINLEVEL_OUTOF10: 4
PAINLEVEL_OUTOF10: 5

## 2017-06-13 ASSESSMENT — GAIT ASSESSMENTS
DISTANCE (FEET): 200
ASSISTIVE DEVICE: FRONT WHEEL WALKER
GAIT LEVEL OF ASSIST: STAND BY ASSIST
DEVIATION: STEP TO

## 2017-06-13 ASSESSMENT — LIFESTYLE VARIABLES: EVER_SMOKED: NEVER

## 2017-06-13 ASSESSMENT — ACTIVITIES OF DAILY LIVING (ADL): TOILETING: INDEPENDENT

## 2017-06-13 NOTE — PROGRESS NOTES
Discharging patient home per MD order.  Pt demonstrated understanding of discharge instructions, follow up appointments, home medications, prescriptions, and home care for surgical wound. Pt is voiding without difficulty, pain well controlled, tolerating oral medications, O2 greater than 90%.  Pt verbalized understanding of discharge instructions and educational handouts and all questions answered.  Pt discharged off unit with hospital escort.

## 2017-06-13 NOTE — PROGRESS NOTES
Pt from PACU at 1600. Awake. No signs of distress noted. DSG CDI. CMS intact to left foot. Can plantar/dorsiflex foot.

## 2017-06-13 NOTE — PROGRESS NOTES
"Post op day # 1    No New Complaints, minimal pain, well controlled    Blood pressure 124/89, pulse 74, temperature 37 °C (98.6 °F), resp. rate 18, height 1.854 m (6' 0.99\"), weight 87.1 kg (192 lb 0.3 oz), SpO2 93 %.    Neurovascular intact  Wound Clean and Dry    Recent Labs      06/13/17   0440   WBC  12.5*   RBC  3.81*   HEMOGLOBIN  13.3*   HEMATOCRIT  36.9*   MCV  96.9   MCH  34.9*   MCHC  36.0*   RDW  43.8   PLATELETCT  199   MPV  11.3     Recent Labs      06/13/17   0440   SODIUM  138   POTASSIUM  3.9   CHLORIDE  106   CO2  26   GLUCOSE  134*   BUN  22   CREATININE  0.96   CALCIUM  9.1       Plan: Home after pm PT.  WBAT on left.  I will arrange outpt PT.  May shower with incision covered.  Dictated    "

## 2017-06-13 NOTE — PROGRESS NOTES
Received report from night RN.  Assumed pt care.  Pt resting comfortably.  Dressing c/d/i.  Will continue with care.

## 2017-06-13 NOTE — DISCHARGE SUMMARY
DATE OF ADMISSION:  06/12/2017    DATE OF DISCHARGE:  06/13/2017    ADMITTING DIAGNOSES:  Severe degenerative arthritis, left hip.    DISCHARGE DIAGNOSES:  Severe degenerative arthritis, left hip.    PROCEDURE:  Left total hip replacement on 06/12/2017.    INFECTIONS:  None.    COMPLICATIONS:  None.    CONDITION ON DISCHARGE:  Stable.    ADMITTING INFORMATION:  Please see dictated note.    HOSPITAL COURSE:  After appropriate preoperative evaluation and obtaining   informed consent, the patient was taken to the operating room on 06/12/2017   where the above listed procedure was performed under general anesthetic agent.    The patient tolerated the procedure well and was taken to recovery room and   then to the floor in stable condition.  He received 24 hours of perioperative   Ancef and was begun on ambulation on the afternoon of surgery.  He was placed   on aspirin and mechanical prophylaxis for DVT prevention.  By postoperative   day #1, he was ambulating well with his walker and comfortable on p.o. pain   medications.  He had a stable hemoglobin, a clean and dry wound with an intact   neurologic and vascular exam with soft, nontender calf.  He will be   discharged home after afternoon therapy on postoperative day #1.    DISPOSITION:  The patient will be discharged home today and will return to see   me in 2 weeks for a wound check and staple removal.  He has been advised   repeatedly on posterior hip precautions and will wear his knee immobilizer in   bed when sleeping.  We will make arrangements for outpatient physical therapy   to begin this week, weightbearing as tolerated on the left hip.  He may shower   with the incision covered, but the wound should be kept clean and dry and we   should be notified if there are problems with increasing pain, drainage,   evidence of infection, neurovascular compromise or other significant concern.    He will take an aspirin twice daily 325 mg for DVT prophylaxis and is    discharged with a prescription for Percocet 10/325, #40 to take 1/2-1 p.o. q.   4 hours p.r.n. pain.  We should be notified if there are problems with   increasing pain, drainage, evidence of infection, neurovascular compromise or   other concern and he will continue with his normal preoperative home   medications.       ____________________________________     MD PRISCILA Patterson / FELECIA    DD:  06/13/2017 08:33:05  DT:  06/13/2017 08:48:12    D#:  [id number]  Job#:  [id number]

## 2017-06-13 NOTE — OP REPORT
DATE OF SERVICE:  06/12/2017    PREOPERATIVE DIAGNOSIS:  Severe degenerative arthritis, left hip.    POSTOPERATIVE DIAGNOSIS:  Severe degenerative arthritis, left hip.    PROCEDURE:  Left total hip replacement using the Biomet system with an   ingrowth size 56 G7 cup, a high wall liner and ingrowth 13 Taperloc stem and a   40+6 head.    SURGEON:  Shay Lafleur MD.    ASSISTANT:  MALCOM Amor.  Of note, the assistant played a crucial role   in this procedure by helping with the exposure as well as performing   reductions and dislocations for this complex arthroplasty.    ANESTHESIA:  General per endotracheal tube.    ANESTHESIOLOGIST:  Diogenes Kumar MD.    OPERATIVE INDICATIONS:  The patient is a 76-year-old white male who has had a   long history of ongoing and worsening left hip pain and x-rays showing severe   degenerative arthritis.  He is no longer responding to conservative program   including activity modification, nonsteroidals, exercise and it is felt that a   hip replacement was indicated in this active gentleman.    PROCEDURE IN DETAIL:  The patient was brought to the operating room and placed   on table in the supine position where a satisfactory general anesthetic agent   was administered per endotracheal tube.  The patient was given 2 g of Ancef   and 10 mg/kg tranexamic acid IV prior to beginning the procedure.  The patient   was placed in right lateral decubitus position and secured on the PEG board.    The left lower extremity was prepped with gel prep, draped free in a sterile   fashion.  A 15 cm posterolateral incision was made, centered over the greater   trochanter and carried sharply through the skin and subcutaneous tissue down   to the fascia zia, which was opened in line with the incision.  A Charnley   retractor was placed.  The abductors were retracted and the piriformis was   identified, tagged and divided.  The short external rotators were taken off as   a sleeve and a  T-shaped capsulotomy was performed.  The hip was dislocated.    A femoral neck osteotomy was made approximately 1.5 cm proximal to the lesser   trochanter.  There was a marked degenerative change in the hip with some   superior osteophytes noted on the acetabulum.  An anterior release was   performed under direct visualization, an anterior retractor was placed with a   Hohmann retractor inferiorly.  The capsule was excised circumferentially.  The   pulvinar was resected.  The transverse acetabular ligament was resected.    Concentric reaming of the acetabulum was performed with a spherical reamers   beginning with a size 44 and going up to a size 55.  This gave excellent   bleeding corticocancellous bone.  A 56 trial was placed with excellent fit.  A   56 G7 cup was placed approximately 45 degrees from the horizontal and 20   degrees of anteversion and fully impacted with excellent stability.  This was   secured with one superior screw going up to column.  A high wall liner trial   was placed with the apex at the 01:30 position.  The proximal femur was   delivered into the wound.  The stump of the piriformis was resected.  The   cookie cutter was used followed by the canal finder, tapered reamer and   lateralizing reamer.  The broaches for the Taperloc stems were then used   beginning with the size 4 and going up to a size 13.  The 13 gave excellent   stability and fit.  Trial reductions were performed with a 40+0, +3, and +6   heads.  The +6 head gave equal leg lengths with excellent stability and a full   range of motion of the hip.  The trials were removed.  The wound was again   copiously irrigated with normal saline using a waterpik.  The permanent high   wall liner was placed again with the apex at the 01:30 position and fully   impacted.  The proximal femur was irrigated.  A size 13 Taperloc stem was   placed and fully impacted again with excellent fit and stability.  A 40+6   trial was placed and this gave  again equal leg lengths with excellent   stability and full range of motion of the left hip.  The permanent 40+6 head   was then applied to the cleaned [place]ah taper and fully impacted.  The wound was   irrigated and a final reduction was performed.  The wound was again copiously   irrigated and the piriformis was repaired with #1 Ethibond.  The fascia zia   was closed with #2 PDO Quill suture, the subcutaneous tissue with 2-0 Monocryl   and the skin with staples.  A sterile dressing of Adaptic fluffs and bandage   tape was applied.  The patient was placed into a knee immobilizer, awakened,   extubated in the operating room, taken to recovery room in stable condition.    Sponge and needle counts were correct.  There were no identified   intraoperative complications.       ____________________________________     MD PRISCILA Patterson / FELECIA    DD:  06/12/2017 15:42:55  DT:  06/12/2017 18:01:48    D#:  [id number]  Job#:  [id number]    cc: KYLE FU [facility] FA

## 2017-06-13 NOTE — CARE PLAN
Problem: Pain Management  Goal: Pain level will decrease to patient’s comfort goal  Scheduled tylenol & toradol and PRN Oxy IR given - see MAR     Problem: Venous Thromboembolism (VTW)/Deep Vein Thrombosis (DVT) Prevention:  Goal: Patient will participate in Venous Thrombosis (VTE)/Deep Vein Thrombosis (DVT)Prevention Measures    06/12/17 2042   OTHER   Risk Assessment Score 3   VTE RISK High   Mechanical Prophylaxis SCDs, Sequentials with JENNI Hose   Pharmacologic Prophylaxis Used (asprin )

## 2017-06-13 NOTE — DISCHARGE INSTRUCTIONS
- Follow up w/ Dr. Lafleur in 2 weeks for a wound check and staple removal.    - Wear knee immobilizer in bed when sleeping.    - Outpatient physical therapy to begin this week  - Weightbearing as tolerated on the left hip.   - May shower with the incision covered, but the wound should be kept clean and dry.  - Notify office if there are problems with increasing pain, drainage, evidence of infection, neurovascular compromise or other significant concern.    - Take an aspirin twice daily 325 mg for DVT prophylaxis  - Continue with normal preoperative home medications.    Discharge Instructions    Discharged to home by car with relative. Discharged via wheelchair, hospital escort: Yes.  Special equipment needed: Not Applicable    Be sure to schedule a follow-up appointment with your primary care doctor or any specialists as instructed.     Discharge Plan:   Influenza Vaccine Indication: Patient Refuses    I understand that a diet low in cholesterol, fat, and sodium is recommended for good health. Unless I have been given specific instructions below for another diet, I accept this instruction as my diet prescription.   Other diet: Regular    Special Instructions: Discharge instructions for the Orthopedic Patient    Follow up with Primary Care Physician within 2 weeks of discharge to home, regarding:  Review of medications and diagnostic testing.  Surveillance for medical complications.  Workup and treatment of osteoporosis, if appropriate.     -Is this a Joint Replacement patient? Yes   Total Joint Hip Replacement Discharge Instructions    Pain  - The goal is to slowly wean off the prescription pain medicine.  - Ice can be used for pain control.  20 minutes at a time is recommended, and never directly against your skin or incision.  - Most patients are off the pain pills by 3 weeks; others may require a low level of pain medications for many months. If your pain continues to be severe, follow up with your  physician.  Infection  Deep hip joint infections that require removal of the prostheses occur in less than 0.1% of patients. Lesser infections in the skin (cellulites) are more common and much more easily treated.  - Keep the incision as clean and dry as possible.  - Always wash your hands before touching your incision.  - Skin infections tend to develop around 7-10 days after surgery, most can be treated with oral antibiotics.  - Dental Care should be delayed for 3 months after surgery, your surgeon recommends taking a dose of antibiotics 1 hour prior to any dental procedure.  After 2 years, most surgeons recommend antibiotics only before an extensive procedure.  Ask your surgeon what he recommends.  - Signs and symptoms of infection can include:  low grade fever, redness, pain, swelling and drainage from your incision.  Notify your surgeon immediately if you develop any of these symptoms.  Post op Disturbances  - Bowel habits - constipation is extremely common and is caused by a combination of anesthesia, lack of mobility and pain medicine.  Use stool softeners or laxatives if necessary. It is important not to ignore this problem, as bowel obstructions can be a serious complication after joint replacement surgery.  - Mood/Energy Level - Many patients experience a lack of energy and endurance for up to 2-3 months after surgery.  Some may also feel down and can even become depressed.  This is likely due to the postoperative anemia, change in activity level, lack of sleep, pain medicine and just the emotional reaction to the surgery itself that is a big disruption in a person’s life.  This usually passes.  If symptoms persist, follow up with your primary physician.  - Returning to work - Your surgeon will give you more specific instructions.  Generally, if you work a sedentary job requiring little standing or walking, most patients may return within 2-6 weeks.  Manual labor jobs involving walking, lifting and  standing may take 3-4 months.  Your surgeon’s office can provide a release to part-time or light duty work early on in your recovery and progress you to full duty as able.  - Driving - You can begin driving an automatic shift car in 4 to 8 weeks, provided you are no longer taking narcotic pain medication. If you have a stick-shift car and your right hip was replaced, do not begin driving until your doctor says you can.   - Avoiding falls -  throw rugs and tack down loose carpeting.  Be aware of floor hazards such as pets, small objects or uneven surfaces.   -  Airport Metal Detectors - The sensitivity of metal detectors varies and it is likely that your prosthesis will cause an alarm. Inform the  that you have an artificial joint.  Diet  - Resume your normal diet as tolerated.  - It is important to achieve a healthy nutritional status by eating a well balanced diet on a regular basis.  - Your physician may recommend that you take iron and vitamin supplements.   - Continue to drink plenty of fluids.  Shower/Bathing  - You may shower as soon as you get home from the hospital unless otherwise instructed.  - Keep your incision out of water.  To keep the incision dry when showering, cover it with a plastic bag or plastic wrap.  - Pat incision dry if it gets wet.  Don’t rub.  - Do not submerge in a bath until staples are out and the incision is completely healed. (Approximately 6-8 weeks after surgery).  Dressing Change:  Procedure (if recommended by your physician)  - Wash hands.  - Open all dressing change materials.  - Remove old dressing and discard.  - Inspect incision for redness, increase in clear drainage, yellow/green drainage, odor and surrounding skin hot to touch.  -  ABD (large gauze) pad by one corner and lay over the incision.  Be careful not to touch the inside of the dressing that will lay over the incision.  - Secure in place as instructed (Ace wrap or  tape).    Swelling/Bruising  - Swelling is normal after hip replacement and can involve the thigh, knee, calf and foot.  - Swelling can last from 3-6 months.  - Elevate your leg higher than your heart while reclining.  The first week you are home you should elevate your leg an equal amount of time, as you are active.    - Anti-inflammatory pills can be taken once you have stopped the blood thinners.  - The swelling is usually worse after you go home since you are upright for longer periods of time.  - Bruising is common and can involve the entire leg including the thigh, calf and even foot.  Bruising often does not appear until after you arrive home and it can be quite dramatic- purple, black, green.  The bruising you can see is not usually concerning and will subside without any treatment.      Blood Clot Prevention  Blood clots in the legs and the less common, but frightening, clots that travel to the lungs are a real focus of our preventative. Most patients are at standard risk for them, but those patients who are at higher risk include people who have had previous clots, a family history of clotting, smoking, diabetes, obesity, advanced age, use of estrogen and a sedentary lifestyle.    - Signs of blood clots in legs - Swelling in thigh, calf or ankle that does not go down with elevation.  Pain, heat and tenderness in calf, back of calf or groin area.  NOTE: blood clots can occur in either leg.  - You have been receiving anticoagulant therapy (blood thinners) in the hospital and you may be instructed to continue at home depending on your risk factors.  - Your risk for developing a clot continues for up to 2-3 months after surgery.  You should avoid prolonged sitting and dehydration during that time (long air trips and car trips).  If you do take a trip during this time, please get up and move around every 1- 1.5 hours.  - If you are prescribed blood thinning medication for home, follow instructions as directed.  (Handouts provided if applicable).      Activity    Once you get home, you should stay active. The key is not to overdo it! While you can expect some good days and some bad days, you should notice a gradual improvement over time you should notice a gradual improvement and a gradual increase in your endurance over the next 6 to 12 months.    - Weight Bearing - If you have undergone cemented or hybrid hip replacement, you can put some weight on the leg immediately using a cane or walker, and you should continue to use some support for 4 to 6 weeks to help the muscles recover.   - Sleeping Positions - Sleep on your back with your legs slightly apart or on your side with a regular pillow between your knees. Be sure to use the pillow for at least 6 weeks, or until your doctor says you can do without it. Sleeping on your stomach should be all right  - Sitting - For at least the first 3 months, sit only in chairs that have arms. Do not sit on low chairs, low stools, or reclining chairs. Do not cross your legs at the knees. The physical therapist will show you how to sit and stand from a chair, keeping your affected leg out in front of you. Get up and move around on a regular basis--at least once every hour.  - Walking - Walk as much as you like once your doctor gives you the go-ahead, but remember that walking is no substitute for your prescribed exercises. Walking with a pair of trekking poles is helpful and adds as much as 40% to the exercise you get when you walk  - Therapy may be needed in some cases, to strengthen your muscles and improve your gait (walking pattern).  This decision will be made at your post-operative appointment.  Follow your therapist recommended post-operative exercises (handout provided by Therapist).  - Swimming is also recommended; you can begin as soon as the sutures have been removed and the wound is healed, approximately 6 to 8 weeks after surgery. Using a pair of training fins may make  swimming a more enjoyable and effective exercise.  - Other activities - Lower impact activities are preferred.  If you have specific questions, consult your Surgeon.    - Sexual activity - Your surgeon can tell you when it should be safe to resume sexual activity.      When to Call the Doctor   Call the physician if:   - Fever over 100.5? F  - Increased pain, drainage, redness, odor or heat around the incision area  - Shaking chills  - Increased knee pain with activity and rest  - Increased pain in calf, tenderness or redness above or below the knee  - Increased swelling of calf, ankle, foot  - Sudden increased shortness of breath, sudden onset of chest pain, localized chest pain with coughing  - Incision opening  Or, if there are any questions or concerns about medications or care.       -Is this patient being discharged with medication to prevent blood clots?  Yes, Aspirin Aspirin, ASA oral tablets  What is this medicine?  ASPIRIN (AS pir in) is a pain reliever. It is used to treat mild pain and fever. This medicine is also used as directed by a doctor to prevent and to treat heart attacks, to prevent strokes, and to treat arthritis or inflammation.  This medicine may be used for other purposes; ask your health care provider or pharmacist if you have questions.  COMMON BRAND NAME(S): Aspir-Low, Aspir-Rachel , Aspirtab , Digital Lifeboat Advanced Aspirin, Digital Lifeboat Aspirin Extra Strength, Digital Lifeboat Aspirin Plus, Digital Lifeboat Aspirin, Digital Lifeboat Genuine Aspirin, Digital Lifeboat Womens Aspirin , Bufferin Extra Strength, Bufferin Low Dose, Bufferin  What should I tell my health care provider before I take this medicine?  They need to know if you have any of these conditions:  -anemia  -asthma  -bleeding problems  -child with chickenpox, the flu, or other viral infection  -diabetes  -gout  -if you frequently drink alcohol containing drinks  -kidney disease  -liver disease  -low level of vitamin K  -lupus  -smoke tobacco  -stomach ulcers or other problems  -an  unusual or allergic reaction to aspirin, tartrazine dye, other medicines, dyes, or preservatives  -pregnant or trying to get pregnant  -breast-feeding  How should I use this medicine?  Take this medicine by mouth with a glass of water. Follow the directions on the package or prescription label. You can take this medicine with or without food. If it upsets your stomach, take it with food. Do not take your medicine more often than directed.  Talk to your pediatrician regarding the use of this medicine in children. While this drug may be prescribed for children as young as 12 years of age for selected conditions, precautions do apply. Children and teenagers should not use this medicine to treat chicken pox or flu symptoms unless directed by a doctor.  Patients over 65 years old may have a stronger reaction and need a smaller dose.  Overdosage: If you think you have taken too much of this medicine contact a poison control center or emergency room at once.  NOTE: This medicine is only for you. Do not share this medicine with others.  What if I miss a dose?  If you are taking this medicine on a regular schedule and miss a dose, take it as soon as you can. If it is almost time for your next dose, take only that dose. Do not take double or extra doses.  What may interact with this medicine?  Do not take this medicine with any of the following medications:  -cidofovir  -ketorolac  -probenecid  This medicine may also interact with the following medications:  -alcohol  -alendronate  -bismuth subsalicylate  -flavocoxid  -herbal supplements like feverfew, garlic, sharmaine, ginkgo biloba, horse chestnut  -medicines for diabetes or glaucoma like acetazolamide, methazolamide  -medicines for gout  -medicines that treat or prevent blood clots like enoxaparin, heparin, ticlopidine, warfarin  -other aspirin and aspirin-like medicines  -NSAIDs, medicines for pain and inflammation, like ibuprofen or  naproxen  -pemetrexed  -sulfinpyrazone  -varicella live vaccine  This list may not describe all possible interactions. Give your health care provider a list of all the medicines, herbs, non-prescription drugs, or dietary supplements you use. Also tell them if you smoke, drink alcohol, or use illegal drugs. Some items may interact with your medicine.  What should I watch for while using this medicine?  If you are treating yourself for pain, tell your doctor or health care professional if the pain lasts more than 10 days, if it gets worse, or if there is a new or different kind of pain. Tell your doctor if you see redness or swelling. Also, check with your doctor if you have a fever that lasts for more than 3 days. Only take this medicine to prevent heart attacks or blood clotting if prescribed by your doctor or health care professional.  Do not take aspirin or aspirin-like medicines with this medicine. Too much aspirin can be dangerous. Always read the labels carefully.  This medicine can irritate your stomach or cause bleeding problems. Do not smoke cigarettes or drink alcohol while taking this medicine. Do not lie down for 30 minutes after taking this medicine to prevent irritation to your throat.  If you are scheduled for any medical or dental procedure, tell your healthcare provider that you are taking this medicine. You may need to stop taking this medicine before the procedure.  What side effects may I notice from receiving this medicine?  Side effects that you should report to your doctor or health care professional as soon as possible:  -allergic reactions like skin rash, itching or hives, swelling of the face, lips, or tongue  -black, tarry stools  -bloody, coffee ground-like vomit  -breathing problems  -changes in hearing, ringing in the ears  -confusion  -general ill feeling or flu-like symptoms  -pain on swallowing  -redness, blistering, peeling or loosening of the skin, including inside the mouth or  nose  -trouble passing urine or change in the amount of urine  -unusual bleeding or bruising  -unusually weak or tired  -yellowing of the eyes or skin  Side effects that usually do not require medical attention (report to your doctor or health care professional if they continue or are bothersome):  -diarrhea or constipation  -nausea, vomiting  -stomach gas, heartburn  This list may not describe all possible side effects. Call your doctor for medical advice about side effects. You may report side effects to FDA at 4-589-SEZ-0751.  Where should I keep my medicine?  Keep out of the reach of children.  Store at room temperature between 15 and 30 degrees C (59 and 86 degrees F). Protect from heat and moisture. Do not use this medicine if it has a strong vinegar smell. Throw away any unused medicine after the expiration date.  NOTE: This sheet is a summary. It may not cover all possible information. If you have questions about this medicine, talk to your doctor, pharmacist, or health care provider.  © 2014, [employer]/Gold Standard. (3/10/2009 10:44:17 AM)      · Is patient discharged on Warfarin / Coumadin?   No     · Is patient Post Blood Transfusion?  No    Depression / Suicide Risk    As you are discharged from this [facility] facility, it is important to learn how to keep safe from harming yourself.    Recognize the warning signs:  · Abrupt changes in personality, positive or negative- including increase in energy   · Giving away possessions  · Change in eating patterns- significant weight changes-  positive or negative  · Change in sleeping patterns- unable to sleep or sleeping all the time   · Unwillingness or inability to communicate  · Depression  · Unusual sadness, discouragement and loneliness  · Talk of wanting to die  · Neglect of personal appearance   · Rebelliousness- reckless behavior  · Withdrawal from people/activities they love  · Confusion- inability to concentrate     If you or a loved one observes  any of these behaviors or has concerns about self-harm, here's what you can do:  · Talk about it- your feelings and reasons for harming yourself  · Remove any means that you might use to hurt yourself (examples: pills, rope, extension cords, firearm)  · Get professional help from the community (Mental Health, Substance Abuse, psychological counseling)  · Do not be alone:Call your Safe Contact- someone whom you trust who will be there for you.  · Call your local CRISIS HOTLINE 338-7234 or 354-620-9499  · Call your local Children's Mobile Crisis Response Team [place] (187) 430-5755 or www.[employer]  · Call the toll free National Suicide Prevention Hotlines   · National Suicide Prevention Lifeline 230-465-NUTE (1269)  · National Hope Line Network 800-SUICIDE (633-5922)

## 2017-06-13 NOTE — THERAPY
"Occupational Therapy Evaluation completed.   Functional Status:  Pt seated up in chair.  Observed walking SBA in [place] with FWW with PT.  Pt able to use reacher for LB dressing with SBA and verbal cues.  Able to verbalize hip precautions with 1 reminder.  Pt has spouse at home, but does have some AE avail to help with ADL's.  Pt reports toileting with supervision with nursing staff. Educated pt on role of OT and posterior Total Hip Precautions with ADL's. Reviewed application of precautions and use of Adaptive Equipment for dressing, bathing, toileting, grooming, kitchen activities and general functional mobility in the home. Reviewed the use of reacher, long handled shoe horn and long handled sponge, as well as shower chair and raised toilet seat to assist with ADL's. Pt denied need for shower chair.   Reviewed stall shower transfers. Provided pt with ADL equipment handout and suggestions on where to obtain needed items.  Pt will obtain necessary ADL equipment. Stressed importance of following posterior hip precautions with all IADL's, and having help to bring all commonly used items to counter reach level so that no bending is required through the course of a normal day.  Educated on covering incision with plastic wrap and skin tape for protection when showering, and around the clock pain management schedule to prevent pain crisis.  Pt verbalized understanding of all education provided.    Plan of Care: Patient with no further skilled OT needs in the acute care setting at this time  Discharge Recommendations:  Equipment: reacher, LH sponge. Post-acute therapy Discharge to home with outpatient or home health for additional skilled therapy services.    See \"Rehab Therapy-Acute\" Patient Summary Report for complete documentation.    "

## 2018-02-02 ENCOUNTER — HOSPITAL ENCOUNTER (OUTPATIENT)
Dept: LAB | Facility: MEDICAL CENTER | Age: 78
End: 2018-02-02
Attending: FAMILY MEDICINE
Payer: MEDICARE

## 2018-02-02 LAB
ALBUMIN SERPL BCP-MCNC: 4.3 G/DL (ref 3.2–4.9)
ALBUMIN/GLOB SERPL: 2.2 G/DL
ALP SERPL-CCNC: 60 U/L (ref 30–99)
ALT SERPL-CCNC: 18 U/L (ref 2–50)
ANION GAP SERPL CALC-SCNC: 5 MMOL/L (ref 0–11.9)
AST SERPL-CCNC: 21 U/L (ref 12–45)
BASOPHILS # BLD AUTO: 0.6 % (ref 0–1.8)
BASOPHILS # BLD: 0.04 K/UL (ref 0–0.12)
BILIRUB SERPL-MCNC: 0.7 MG/DL (ref 0.1–1.5)
BUN SERPL-MCNC: 22 MG/DL (ref 8–22)
CALCIUM SERPL-MCNC: 10.2 MG/DL (ref 8.5–10.5)
CHLORIDE SERPL-SCNC: 104 MMOL/L (ref 96–112)
CHOLEST SERPL-MCNC: 138 MG/DL (ref 100–199)
CO2 SERPL-SCNC: 30 MMOL/L (ref 20–33)
CREAT SERPL-MCNC: 1 MG/DL (ref 0.5–1.4)
EOSINOPHIL # BLD AUTO: 0.28 K/UL (ref 0–0.51)
EOSINOPHIL NFR BLD: 4.3 % (ref 0–6.9)
ERYTHROCYTE [DISTWIDTH] IN BLOOD BY AUTOMATED COUNT: 44.6 FL (ref 35.9–50)
GLOBULIN SER CALC-MCNC: 2 G/DL (ref 1.9–3.5)
GLUCOSE SERPL-MCNC: 86 MG/DL (ref 65–99)
HCT VFR BLD AUTO: 46.5 % (ref 42–52)
HDLC SERPL-MCNC: 33 MG/DL
HGB BLD-MCNC: 16.5 G/DL (ref 14–18)
IMM GRANULOCYTES # BLD AUTO: 0.01 K/UL (ref 0–0.11)
IMM GRANULOCYTES NFR BLD AUTO: 0.2 % (ref 0–0.9)
LDLC SERPL CALC-MCNC: 74 MG/DL
LYMPHOCYTES # BLD AUTO: 3.59 K/UL (ref 1–4.8)
LYMPHOCYTES NFR BLD: 55.7 % (ref 22–41)
MCH RBC QN AUTO: 34.9 PG (ref 27–33)
MCHC RBC AUTO-ENTMCNC: 35.5 G/DL (ref 33.7–35.3)
MCV RBC AUTO: 98.3 FL (ref 81.4–97.8)
MONOCYTES # BLD AUTO: 0.59 K/UL (ref 0–0.85)
MONOCYTES NFR BLD AUTO: 9.1 % (ref 0–13.4)
NEUTROPHILS # BLD AUTO: 1.94 K/UL (ref 1.82–7.42)
NEUTROPHILS NFR BLD: 30.1 % (ref 44–72)
NRBC # BLD AUTO: 0 K/UL
NRBC BLD-RTO: 0 /100 WBC
PLATELET # BLD AUTO: 191 K/UL (ref 164–446)
PMV BLD AUTO: 11.7 FL (ref 9–12.9)
POTASSIUM SERPL-SCNC: 3.8 MMOL/L (ref 3.6–5.5)
PROT SERPL-MCNC: 6.3 G/DL (ref 6–8.2)
RBC # BLD AUTO: 4.73 M/UL (ref 4.7–6.1)
SODIUM SERPL-SCNC: 139 MMOL/L (ref 135–145)
TRIGL SERPL-MCNC: 157 MG/DL (ref 0–149)
WBC # BLD AUTO: 6.5 K/UL (ref 4.8–10.8)

## 2018-02-02 PROCEDURE — 36415 COLL VENOUS BLD VENIPUNCTURE: CPT

## 2018-02-02 PROCEDURE — 80053 COMPREHEN METABOLIC PANEL: CPT

## 2018-02-02 PROCEDURE — 85025 COMPLETE CBC W/AUTO DIFF WBC: CPT

## 2018-02-02 PROCEDURE — 80061 LIPID PANEL: CPT

## 2018-08-03 ENCOUNTER — HOSPITAL ENCOUNTER (OUTPATIENT)
Dept: RADIOLOGY | Facility: MEDICAL CENTER | Age: 78
End: 2018-08-03
Attending: FAMILY MEDICINE
Payer: MEDICARE

## 2018-08-03 ENCOUNTER — HOSPITAL ENCOUNTER (OUTPATIENT)
Dept: LAB | Facility: MEDICAL CENTER | Age: 78
End: 2018-08-03
Attending: FAMILY MEDICINE
Payer: MEDICARE

## 2018-08-03 DIAGNOSIS — M25.511 ACUTE PAIN OF RIGHT SHOULDER: ICD-10-CM

## 2018-08-03 LAB
ALBUMIN SERPL BCP-MCNC: 4.4 G/DL (ref 3.2–4.9)
ALBUMIN/GLOB SERPL: 2.3 G/DL
ALP SERPL-CCNC: 63 U/L (ref 30–99)
ALT SERPL-CCNC: 17 U/L (ref 2–50)
ANION GAP SERPL CALC-SCNC: 7 MMOL/L (ref 0–11.9)
APPEARANCE UR: CLEAR
AST SERPL-CCNC: 22 U/L (ref 12–45)
BASOPHILS # BLD AUTO: 0.3 % (ref 0–1.8)
BASOPHILS # BLD: 0.02 K/UL (ref 0–0.12)
BILIRUB SERPL-MCNC: 1.1 MG/DL (ref 0.1–1.5)
BILIRUB UR QL STRIP.AUTO: NEGATIVE
BUN SERPL-MCNC: 30 MG/DL (ref 8–22)
CALCIUM SERPL-MCNC: 9.6 MG/DL (ref 8.5–10.5)
CHLORIDE SERPL-SCNC: 106 MMOL/L (ref 96–112)
CHOLEST SERPL-MCNC: 114 MG/DL (ref 100–199)
CO2 SERPL-SCNC: 25 MMOL/L (ref 20–33)
COLOR UR: YELLOW
CREAT SERPL-MCNC: 1.04 MG/DL (ref 0.5–1.4)
EOSINOPHIL # BLD AUTO: 0.18 K/UL (ref 0–0.51)
EOSINOPHIL NFR BLD: 2.9 % (ref 0–6.9)
ERYTHROCYTE [DISTWIDTH] IN BLOOD BY AUTOMATED COUNT: 45.4 FL (ref 35.9–50)
GLOBULIN SER CALC-MCNC: 1.9 G/DL (ref 1.9–3.5)
GLUCOSE SERPL-MCNC: 93 MG/DL (ref 65–99)
GLUCOSE UR STRIP.AUTO-MCNC: NEGATIVE MG/DL
HCT VFR BLD AUTO: 45.9 % (ref 42–52)
HDLC SERPL-MCNC: 32 MG/DL
HGB BLD-MCNC: 16.4 G/DL (ref 14–18)
IMM GRANULOCYTES # BLD AUTO: 0.02 K/UL (ref 0–0.11)
IMM GRANULOCYTES NFR BLD AUTO: 0.3 % (ref 0–0.9)
KETONES UR STRIP.AUTO-MCNC: NEGATIVE MG/DL
LDLC SERPL CALC-MCNC: 54 MG/DL
LEUKOCYTE ESTERASE UR QL STRIP.AUTO: NEGATIVE
LYMPHOCYTES # BLD AUTO: 2.46 K/UL (ref 1–4.8)
LYMPHOCYTES NFR BLD: 39.8 % (ref 22–41)
MCH RBC QN AUTO: 35.3 PG (ref 27–33)
MCHC RBC AUTO-ENTMCNC: 35.7 G/DL (ref 33.7–35.3)
MCV RBC AUTO: 98.9 FL (ref 81.4–97.8)
MICRO URNS: NORMAL
MONOCYTES # BLD AUTO: 0.55 K/UL (ref 0–0.85)
MONOCYTES NFR BLD AUTO: 8.9 % (ref 0–13.4)
NEUTROPHILS # BLD AUTO: 2.95 K/UL (ref 1.82–7.42)
NEUTROPHILS NFR BLD: 47.8 % (ref 44–72)
NITRITE UR QL STRIP.AUTO: NEGATIVE
NRBC # BLD AUTO: 0 K/UL
NRBC BLD-RTO: 0 /100 WBC
PH UR STRIP.AUTO: 6 [PH]
PLATELET # BLD AUTO: 218 K/UL (ref 164–446)
PMV BLD AUTO: 11.7 FL (ref 9–12.9)
POTASSIUM SERPL-SCNC: 3.5 MMOL/L (ref 3.6–5.5)
PROT SERPL-MCNC: 6.3 G/DL (ref 6–8.2)
PROT UR QL STRIP: NEGATIVE MG/DL
PSA SERPL-MCNC: 0.01 NG/ML (ref 0–4)
RBC # BLD AUTO: 4.64 M/UL (ref 4.7–6.1)
RBC UR QL AUTO: NEGATIVE
SODIUM SERPL-SCNC: 138 MMOL/L (ref 135–145)
SP GR UR STRIP.AUTO: 1.03
TRIGL SERPL-MCNC: 139 MG/DL (ref 0–149)
UROBILINOGEN UR STRIP.AUTO-MCNC: 0.2 MG/DL
WBC # BLD AUTO: 6.2 K/UL (ref 4.8–10.8)

## 2018-08-03 PROCEDURE — 81003 URINALYSIS AUTO W/O SCOPE: CPT

## 2018-08-03 PROCEDURE — 84153 ASSAY OF PSA TOTAL: CPT

## 2018-08-03 PROCEDURE — 85025 COMPLETE CBC W/AUTO DIFF WBC: CPT

## 2018-08-03 PROCEDURE — 80061 LIPID PANEL: CPT

## 2018-08-03 PROCEDURE — 80053 COMPREHEN METABOLIC PANEL: CPT

## 2018-08-03 PROCEDURE — 73030 X-RAY EXAM OF SHOULDER: CPT | Mod: RT

## 2018-08-03 PROCEDURE — 36415 COLL VENOUS BLD VENIPUNCTURE: CPT

## 2019-08-16 ENCOUNTER — HOSPITAL ENCOUNTER (OUTPATIENT)
Dept: LAB | Facility: MEDICAL CENTER | Age: 79
End: 2019-08-16
Attending: FAMILY MEDICINE
Payer: MEDICARE

## 2019-08-16 LAB
ALBUMIN SERPL BCP-MCNC: 4.1 G/DL (ref 3.2–4.9)
ALBUMIN/GLOB SERPL: 1.9 G/DL
ALP SERPL-CCNC: 61 U/L (ref 30–99)
ALT SERPL-CCNC: 16 U/L (ref 2–50)
ANION GAP SERPL CALC-SCNC: 8 MMOL/L (ref 0–11.9)
AST SERPL-CCNC: 19 U/L (ref 12–45)
BILIRUB SERPL-MCNC: 1.1 MG/DL (ref 0.1–1.5)
BUN SERPL-MCNC: 28 MG/DL (ref 8–22)
CALCIUM SERPL-MCNC: 10 MG/DL (ref 8.5–10.5)
CHLORIDE SERPL-SCNC: 108 MMOL/L (ref 96–112)
CHOLEST SERPL-MCNC: 125 MG/DL (ref 100–199)
CO2 SERPL-SCNC: 25 MMOL/L (ref 20–33)
CREAT SERPL-MCNC: 1.07 MG/DL (ref 0.5–1.4)
FASTING STATUS PATIENT QL REPORTED: NORMAL
GLOBULIN SER CALC-MCNC: 2.2 G/DL (ref 1.9–3.5)
GLUCOSE SERPL-MCNC: 85 MG/DL (ref 65–99)
HDLC SERPL-MCNC: 33 MG/DL
LDLC SERPL CALC-MCNC: 57 MG/DL
POTASSIUM SERPL-SCNC: 3.6 MMOL/L (ref 3.6–5.5)
PROT SERPL-MCNC: 6.3 G/DL (ref 6–8.2)
PSA SERPL-MCNC: 0.01 NG/ML (ref 0–4)
SODIUM SERPL-SCNC: 141 MMOL/L (ref 135–145)
TRIGL SERPL-MCNC: 174 MG/DL (ref 0–149)

## 2019-08-16 PROCEDURE — 36415 COLL VENOUS BLD VENIPUNCTURE: CPT

## 2019-08-16 PROCEDURE — 80061 LIPID PANEL: CPT

## 2019-08-16 PROCEDURE — 80053 COMPREHEN METABOLIC PANEL: CPT

## 2019-08-16 PROCEDURE — 84153 ASSAY OF PSA TOTAL: CPT

## 2020-01-01 ENCOUNTER — HOME CARE VISIT (OUTPATIENT)
Dept: HOME HEALTH SERVICES | Facility: HOME HEALTHCARE | Age: 80
End: 2020-01-01
Payer: MEDICARE

## 2020-01-01 ENCOUNTER — HOSPITAL ENCOUNTER (OUTPATIENT)
Dept: RADIOLOGY | Facility: MEDICAL CENTER | Age: 80
End: 2020-05-29
Attending: FAMILY MEDICINE
Payer: MEDICARE

## 2020-01-01 ENCOUNTER — APPOINTMENT (OUTPATIENT)
Dept: RADIOLOGY | Facility: MEDICAL CENTER | Age: 80
End: 2020-01-01
Attending: NURSE PRACTITIONER
Payer: MEDICARE

## 2020-01-01 ENCOUNTER — ANTICOAGULATION MONITORING (OUTPATIENT)
Dept: VASCULAR LAB | Facility: MEDICAL CENTER | Age: 80
End: 2020-01-01

## 2020-01-01 ENCOUNTER — APPOINTMENT (OUTPATIENT)
Dept: RADIOLOGY | Facility: MEDICAL CENTER | Age: 80
DRG: 460 | End: 2020-01-01
Attending: NEUROLOGICAL SURGERY
Payer: MEDICARE

## 2020-01-01 ENCOUNTER — ANESTHESIA EVENT (OUTPATIENT)
Dept: SURGERY | Facility: MEDICAL CENTER | Age: 80
DRG: 460 | End: 2020-01-01
Payer: MEDICARE

## 2020-01-01 ENCOUNTER — HOME HEALTH ADMISSION (OUTPATIENT)
Dept: HOME HEALTH SERVICES | Facility: HOME HEALTHCARE | Age: 80
End: 2020-01-01
Payer: MEDICARE

## 2020-01-01 ENCOUNTER — APPOINTMENT (OUTPATIENT)
Dept: RADIOLOGY | Facility: MEDICAL CENTER | Age: 80
End: 2020-01-01
Attending: PSYCHIATRY & NEUROLOGY
Payer: MEDICARE

## 2020-01-01 ENCOUNTER — HOSPITAL ENCOUNTER (INPATIENT)
Facility: MEDICAL CENTER | Age: 80
LOS: 2 days | DRG: 460 | End: 2020-09-13
Attending: NEUROLOGICAL SURGERY | Admitting: NEUROLOGICAL SURGERY
Payer: MEDICARE

## 2020-01-01 ENCOUNTER — HOSPITAL ENCOUNTER (OUTPATIENT)
Dept: RADIOLOGY | Facility: MEDICAL CENTER | Age: 80
End: 2020-05-19
Attending: FAMILY MEDICINE
Payer: MEDICARE

## 2020-01-01 ENCOUNTER — HOSPITAL ENCOUNTER (OUTPATIENT)
Dept: LAB | Facility: MEDICAL CENTER | Age: 80
End: 2020-05-15
Attending: FAMILY MEDICINE
Payer: MEDICARE

## 2020-01-01 ENCOUNTER — ANESTHESIA (OUTPATIENT)
Dept: SURGERY | Facility: MEDICAL CENTER | Age: 80
DRG: 460 | End: 2020-01-01
Payer: MEDICARE

## 2020-01-01 ENCOUNTER — HOSPITAL ENCOUNTER (OUTPATIENT)
Dept: RADIOLOGY | Facility: MEDICAL CENTER | Age: 80
DRG: 460 | End: 2020-09-09
Attending: NEUROLOGICAL SURGERY | Admitting: NEUROLOGICAL SURGERY
Payer: MEDICARE

## 2020-01-01 ENCOUNTER — PRE-ADMISSION TESTING (OUTPATIENT)
Dept: ADMISSIONS | Facility: MEDICAL CENTER | Age: 80
DRG: 460 | End: 2020-01-01
Attending: NEUROLOGICAL SURGERY
Payer: MEDICARE

## 2020-01-01 VITALS
SYSTOLIC BLOOD PRESSURE: 131 MMHG | RESPIRATION RATE: 16 BRPM | HEIGHT: 72 IN | BODY MASS INDEX: 25.11 KG/M2 | OXYGEN SATURATION: 91 % | WEIGHT: 185.41 LBS | DIASTOLIC BLOOD PRESSURE: 85 MMHG | HEART RATE: 89 BPM | TEMPERATURE: 97.6 F

## 2020-01-01 VITALS
RESPIRATION RATE: 16 BRPM | SYSTOLIC BLOOD PRESSURE: 130 MMHG | OXYGEN SATURATION: 96 % | HEART RATE: 76 BPM | DIASTOLIC BLOOD PRESSURE: 76 MMHG | TEMPERATURE: 97.6 F

## 2020-01-01 VITALS
HEART RATE: 74 BPM | SYSTOLIC BLOOD PRESSURE: 126 MMHG | RESPIRATION RATE: 16 BRPM | TEMPERATURE: 98.3 F | OXYGEN SATURATION: 97 % | DIASTOLIC BLOOD PRESSURE: 80 MMHG

## 2020-01-01 VITALS
RESPIRATION RATE: 16 BRPM | TEMPERATURE: 98 F | OXYGEN SATURATION: 94 % | DIASTOLIC BLOOD PRESSURE: 80 MMHG | HEART RATE: 73 BPM | SYSTOLIC BLOOD PRESSURE: 146 MMHG

## 2020-01-01 VITALS
SYSTOLIC BLOOD PRESSURE: 130 MMHG | RESPIRATION RATE: 16 BRPM | DIASTOLIC BLOOD PRESSURE: 82 MMHG | OXYGEN SATURATION: 96 % | TEMPERATURE: 97.5 F | HEART RATE: 74 BPM

## 2020-01-01 VITALS
SYSTOLIC BLOOD PRESSURE: 130 MMHG | DIASTOLIC BLOOD PRESSURE: 80 MMHG | TEMPERATURE: 97.5 F | RESPIRATION RATE: 16 BRPM | HEART RATE: 71 BPM | OXYGEN SATURATION: 96 %

## 2020-01-01 VITALS
WEIGHT: 185 LBS | SYSTOLIC BLOOD PRESSURE: 118 MMHG | RESPIRATION RATE: 14 BRPM | DIASTOLIC BLOOD PRESSURE: 70 MMHG | HEIGHT: 72 IN | TEMPERATURE: 98 F | HEART RATE: 87 BPM | BODY MASS INDEX: 25.06 KG/M2 | OXYGEN SATURATION: 95 %

## 2020-01-01 VITALS
HEART RATE: 75 BPM | TEMPERATURE: 97.7 F | RESPIRATION RATE: 18 BRPM | SYSTOLIC BLOOD PRESSURE: 125 MMHG | OXYGEN SATURATION: 96 % | DIASTOLIC BLOOD PRESSURE: 72 MMHG

## 2020-01-01 VITALS
RESPIRATION RATE: 16 BRPM | OXYGEN SATURATION: 98 % | DIASTOLIC BLOOD PRESSURE: 68 MMHG | HEART RATE: 80 BPM | TEMPERATURE: 97.9 F | SYSTOLIC BLOOD PRESSURE: 116 MMHG

## 2020-01-01 DIAGNOSIS — M79.606 PAIN OF LOWER EXTREMITY, UNSPECIFIED LATERALITY: ICD-10-CM

## 2020-01-01 DIAGNOSIS — R26.81 UNSTEADY: ICD-10-CM

## 2020-01-01 DIAGNOSIS — M62.81 MUSCLE WEAKNESS (GENERALIZED): ICD-10-CM

## 2020-01-01 DIAGNOSIS — G99.2 MYELOPATHY IN DISEASES CLASSIFIED ELSEWHERE (HCC): ICD-10-CM

## 2020-01-01 DIAGNOSIS — Z01.811 PRE-OPERATIVE RESPIRATORY EXAMINATION: ICD-10-CM

## 2020-01-01 DIAGNOSIS — R20.2 PARESTHESIA: ICD-10-CM

## 2020-01-01 DIAGNOSIS — R90.82 WHITE MATTER DISEASE: ICD-10-CM

## 2020-01-01 DIAGNOSIS — G89.18 POSTOPERATIVE PAIN: ICD-10-CM

## 2020-01-01 DIAGNOSIS — Z01.810 PRE-OPERATIVE CARDIOVASCULAR EXAMINATION: ICD-10-CM

## 2020-01-01 DIAGNOSIS — Z01.812 PRE-OPERATIVE LABORATORY EXAMINATION: ICD-10-CM

## 2020-01-01 LAB
25(OH)D3 SERPL-MCNC: 29 NG/ML (ref 30–100)
ALBUMIN SERPL BCP-MCNC: 4.2 G/DL (ref 3.2–4.9)
ALBUMIN/GLOB SERPL: 2.2 G/DL
ALP SERPL-CCNC: 67 U/L (ref 30–99)
ALT SERPL-CCNC: 21 U/L (ref 2–50)
ANION GAP SERPL CALC-SCNC: 13 MMOL/L (ref 7–16)
ANION GAP SERPL CALC-SCNC: 14 MMOL/L (ref 7–16)
APTT PPP: 31.3 SEC (ref 24.7–36)
AST SERPL-CCNC: 23 U/L (ref 12–45)
BASOPHILS # BLD AUTO: 0.3 % (ref 0–1.8)
BASOPHILS # BLD AUTO: 0.5 % (ref 0–1.8)
BASOPHILS # BLD: 0.02 K/UL (ref 0–0.12)
BASOPHILS # BLD: 0.03 K/UL (ref 0–0.12)
BILIRUB SERPL-MCNC: 0.8 MG/DL (ref 0.1–1.5)
BUN SERPL-MCNC: 22 MG/DL (ref 8–22)
BUN SERPL-MCNC: 25 MG/DL (ref 8–22)
CALCIUM SERPL-MCNC: 10.2 MG/DL (ref 8.5–10.5)
CALCIUM SERPL-MCNC: 10.5 MG/DL (ref 8.5–10.5)
CHLORIDE SERPL-SCNC: 101 MMOL/L (ref 96–112)
CHLORIDE SERPL-SCNC: 102 MMOL/L (ref 96–112)
CK SERPL-CCNC: 175 U/L (ref 0–154)
CO2 SERPL-SCNC: 21 MMOL/L (ref 20–33)
CO2 SERPL-SCNC: 24 MMOL/L (ref 20–33)
COVID ORDER STATUS COVID19: NORMAL
CREAT SERPL-MCNC: 0.8 MG/DL (ref 0.5–1.4)
CREAT SERPL-MCNC: 0.94 MG/DL (ref 0.5–1.4)
CRP SERPL HS-MCNC: 0.05 MG/DL (ref 0–0.75)
EKG IMPRESSION: NORMAL
EOSINOPHIL # BLD AUTO: 0.18 K/UL (ref 0–0.51)
EOSINOPHIL # BLD AUTO: 0.22 K/UL (ref 0–0.51)
EOSINOPHIL NFR BLD: 3.1 % (ref 0–6.9)
EOSINOPHIL NFR BLD: 3.7 % (ref 0–6.9)
ERYTHROCYTE [DISTWIDTH] IN BLOOD BY AUTOMATED COUNT: 47 FL (ref 35.9–50)
ERYTHROCYTE [DISTWIDTH] IN BLOOD BY AUTOMATED COUNT: 55.8 FL (ref 35.9–50)
FOLATE SERPL-MCNC: 18.4 NG/ML
GLOBULIN SER CALC-MCNC: 1.9 G/DL (ref 1.9–3.5)
GLUCOSE SERPL-MCNC: 121 MG/DL (ref 65–99)
GLUCOSE SERPL-MCNC: 92 MG/DL (ref 65–99)
HCT VFR BLD AUTO: 45 % (ref 42–52)
HCT VFR BLD AUTO: 47.6 % (ref 42–52)
HGB BLD-MCNC: 16.4 G/DL (ref 14–18)
HGB BLD-MCNC: 16.9 G/DL (ref 14–18)
IMM GRANULOCYTES # BLD AUTO: 0.01 K/UL (ref 0–0.11)
IMM GRANULOCYTES # BLD AUTO: 0.01 K/UL (ref 0–0.11)
IMM GRANULOCYTES NFR BLD AUTO: 0.2 % (ref 0–0.9)
IMM GRANULOCYTES NFR BLD AUTO: 0.2 % (ref 0–0.9)
INR PPP: 0.98 (ref 0.87–1.13)
LEAD BLDV-MCNC: <2 UG/DL (ref 0–4.9)
LYMPHOCYTES # BLD AUTO: 2.19 K/UL (ref 1–4.8)
LYMPHOCYTES # BLD AUTO: 2.75 K/UL (ref 1–4.8)
LYMPHOCYTES NFR BLD: 37.2 % (ref 22–41)
LYMPHOCYTES NFR BLD: 48 % (ref 22–41)
MCH RBC QN AUTO: 35.5 PG (ref 27–33)
MCH RBC QN AUTO: 36.3 PG (ref 27–33)
MCHC RBC AUTO-ENTMCNC: 35.5 G/DL (ref 33.7–35.3)
MCHC RBC AUTO-ENTMCNC: 36.4 G/DL (ref 33.7–35.3)
MCV RBC AUTO: 100 FL (ref 81.4–97.8)
MCV RBC AUTO: 99.6 FL (ref 81.4–97.8)
MERCURY BLD-MCNC: <2.5 UG/L (ref 0–10)
MONOCYTES # BLD AUTO: 0.41 K/UL (ref 0–0.85)
MONOCYTES # BLD AUTO: 0.47 K/UL (ref 0–0.85)
MONOCYTES NFR BLD AUTO: 7.2 % (ref 0–13.4)
MONOCYTES NFR BLD AUTO: 8 % (ref 0–13.4)
NEUTROPHILS # BLD AUTO: 2.36 K/UL (ref 1.82–7.42)
NEUTROPHILS # BLD AUTO: 2.96 K/UL (ref 1.82–7.42)
NEUTROPHILS NFR BLD: 41.2 % (ref 44–72)
NEUTROPHILS NFR BLD: 50.4 % (ref 44–72)
NRBC # BLD AUTO: 0 K/UL
NRBC # BLD AUTO: 0 K/UL
NRBC BLD-RTO: 0 /100 WBC
NRBC BLD-RTO: 0 /100 WBC
PLATELET # BLD AUTO: 193 K/UL (ref 164–446)
PLATELET # BLD AUTO: 318 K/UL (ref 164–446)
PMV BLD AUTO: 10.8 FL (ref 9–12.9)
PMV BLD AUTO: 10.9 FL (ref 9–12.9)
POTASSIUM SERPL-SCNC: 3.8 MMOL/L (ref 3.6–5.5)
POTASSIUM SERPL-SCNC: 4.1 MMOL/L (ref 3.6–5.5)
PROT SERPL-MCNC: 6.1 G/DL (ref 6–8.2)
PROTHROMBIN TIME: 13.2 SEC (ref 12–14.6)
RBC # BLD AUTO: 4.52 M/UL (ref 4.7–6.1)
RBC # BLD AUTO: 4.76 M/UL (ref 4.7–6.1)
SARS-COV-2 RNA RESP QL NAA+PROBE: NOTDETECTED
SODIUM SERPL-SCNC: 136 MMOL/L (ref 135–145)
SODIUM SERPL-SCNC: 139 MMOL/L (ref 135–145)
SPECIMEN SOURCE: NORMAL
TREPONEMA PALLIDUM IGG+IGM AB [PRESENCE] IN SERUM OR PLASMA BY IMMUNOASSAY: NORMAL
TSH SERPL DL<=0.005 MIU/L-ACNC: 4 UIU/ML (ref 0.38–5.33)
VIT B12 SERPL-MCNC: 1553 PG/ML (ref 211–911)
VIT B6 SERPL-MCNC: 339.1 NMOL/L (ref 20–125)
WBC # BLD AUTO: 5.7 K/UL (ref 4.8–10.8)
WBC # BLD AUTO: 5.9 K/UL (ref 4.8–10.8)

## 2020-01-01 PROCEDURE — 72020 X-RAY EXAM OF SPINE 1 VIEW: CPT

## 2020-01-01 PROCEDURE — A4314 CATH W/DRAINAGE 2-WAY LATEX: HCPCS | Performed by: NEUROLOGICAL SURGERY

## 2020-01-01 PROCEDURE — 700102 HCHG RX REV CODE 250 W/ 637 OVERRIDE(OP): Performed by: NEUROLOGICAL SURGERY

## 2020-01-01 PROCEDURE — 700101 HCHG RX REV CODE 250: Performed by: NEUROLOGICAL SURGERY

## 2020-01-01 PROCEDURE — 700105 HCHG RX REV CODE 258: Performed by: ANESTHESIOLOGY

## 2020-01-01 PROCEDURE — 160002 HCHG RECOVERY MINUTES (STAT): Performed by: NEUROLOGICAL SURGERY

## 2020-01-01 PROCEDURE — U0003 INFECTIOUS AGENT DETECTION BY NUCLEIC ACID (DNA OR RNA); SEVERE ACUTE RESPIRATORY SYNDROME CORONAVIRUS 2 (SARS-COV-2) (CORONAVIRUS DISEASE [COVID-19]), AMPLIFIED PROBE TECHNIQUE, MAKING USE OF HIGH THROUGHPUT TECHNOLOGIES AS DESCRIBED BY CMS-2020-01-R: HCPCS

## 2020-01-01 PROCEDURE — C1713 ANCHOR/SCREW BN/BN,TIS/BN: HCPCS | Performed by: NEUROLOGICAL SURGERY

## 2020-01-01 PROCEDURE — G0299 HHS/HOSPICE OF RN EA 15 MIN: HCPCS

## 2020-01-01 PROCEDURE — 700105 HCHG RX REV CODE 258: Performed by: NEUROLOGICAL SURGERY

## 2020-01-01 PROCEDURE — C9803 HOPD COVID-19 SPEC COLLECT: HCPCS

## 2020-01-01 PROCEDURE — 72100 X-RAY EXAM L-S SPINE 2/3 VWS: CPT

## 2020-01-01 PROCEDURE — 700111 HCHG RX REV CODE 636 W/ 250 OVERRIDE (IP): Performed by: NEUROLOGICAL SURGERY

## 2020-01-01 PROCEDURE — 70551 MRI BRAIN STEM W/O DYE: CPT

## 2020-01-01 PROCEDURE — 700102 HCHG RX REV CODE 250 W/ 637 OVERRIDE(OP): Performed by: PHYSICIAN ASSISTANT

## 2020-01-01 PROCEDURE — 85025 COMPLETE CBC W/AUTO DIFF WBC: CPT

## 2020-01-01 PROCEDURE — 93010 ELECTROCARDIOGRAM REPORT: CPT | Performed by: INTERNAL MEDICINE

## 2020-01-01 PROCEDURE — 72148 MRI LUMBAR SPINE W/O DYE: CPT

## 2020-01-01 PROCEDURE — 86140 C-REACTIVE PROTEIN: CPT

## 2020-01-01 PROCEDURE — 700111 HCHG RX REV CODE 636 W/ 250 OVERRIDE (IP): Performed by: ANESTHESIOLOGY

## 2020-01-01 PROCEDURE — 86780 TREPONEMA PALLIDUM: CPT

## 2020-01-01 PROCEDURE — 4A11X4G MONITORING OF PERIPHERAL NERVOUS ELECTRICAL ACTIVITY, INTRAOPERATIVE, EXTERNAL APPROACH: ICD-10-PCS | Performed by: NEUROLOGICAL SURGERY

## 2020-01-01 PROCEDURE — 82306 VITAMIN D 25 HYDROXY: CPT

## 2020-01-01 PROCEDURE — A9270 NON-COVERED ITEM OR SERVICE: HCPCS | Performed by: PHYSICIAN ASSISTANT

## 2020-01-01 PROCEDURE — 84443 ASSAY THYROID STIM HORMONE: CPT

## 2020-01-01 PROCEDURE — 0RGA071 FUSION OF THORACOLUMBAR VERTEBRAL JOINT WITH AUTOLOGOUS TISSUE SUBSTITUTE, POSTERIOR APPROACH, POSTERIOR COLUMN, OPEN APPROACH: ICD-10-PCS | Performed by: NEUROLOGICAL SURGERY

## 2020-01-01 PROCEDURE — 700102 HCHG RX REV CODE 250 W/ 637 OVERRIDE(OP): Performed by: ANESTHESIOLOGY

## 2020-01-01 PROCEDURE — 502000 HCHG MISC OR IMPLANTS RC 0278: Performed by: NEUROLOGICAL SURGERY

## 2020-01-01 PROCEDURE — 82746 ASSAY OF FOLIC ACID SERUM: CPT

## 2020-01-01 PROCEDURE — 110454 HCHG SHELL REV 250: Performed by: NEUROLOGICAL SURGERY

## 2020-01-01 PROCEDURE — 700106 HCHG RX REV CODE 271: Performed by: NEUROLOGICAL SURGERY

## 2020-01-01 PROCEDURE — 95869 NDL EMG THRC PARASPINAL MUSC: CPT | Performed by: NEUROLOGICAL SURGERY

## 2020-01-01 PROCEDURE — 160035 HCHG PACU - 1ST 60 MINS PHASE I: Performed by: NEUROLOGICAL SURGERY

## 2020-01-01 PROCEDURE — 700111 HCHG RX REV CODE 636 W/ 250 OVERRIDE (IP): Performed by: PHYSICIAN ASSISTANT

## 2020-01-01 PROCEDURE — L0458 TLSO 2MOD SYMPHIS-XIPHO PRE: HCPCS

## 2020-01-01 PROCEDURE — 0RT90ZZ RESECTION OF THORACIC VERTEBRAL DISC, OPEN APPROACH: ICD-10-PCS | Performed by: NEUROLOGICAL SURGERY

## 2020-01-01 PROCEDURE — 85610 PROTHROMBIN TIME: CPT

## 2020-01-01 PROCEDURE — 160009 HCHG ANES TIME/MIN: Performed by: NEUROLOGICAL SURGERY

## 2020-01-01 PROCEDURE — 97165 OT EVAL LOW COMPLEX 30 MIN: CPT

## 2020-01-01 PROCEDURE — 665001 SOC-HOME HEALTH

## 2020-01-01 PROCEDURE — 93005 ELECTROCARDIOGRAM TRACING: CPT

## 2020-01-01 PROCEDURE — 501838 HCHG SUTURE GENERAL: Performed by: NEUROLOGICAL SURGERY

## 2020-01-01 PROCEDURE — 770006 HCHG ROOM/CARE - MED/SURG/GYN SEMI*

## 2020-01-01 PROCEDURE — 85730 THROMBOPLASTIN TIME PARTIAL: CPT

## 2020-01-01 PROCEDURE — 160031 HCHG SURGERY MINUTES - 1ST 30 MINS LEVEL 5: Performed by: NEUROLOGICAL SURGERY

## 2020-01-01 PROCEDURE — 160048 HCHG OR STATISTICAL LEVEL 1-5: Performed by: NEUROLOGICAL SURGERY

## 2020-01-01 PROCEDURE — 95861 NEEDLE EMG 2 EXTREMITIES: CPT | Performed by: NEUROLOGICAL SURGERY

## 2020-01-01 PROCEDURE — G0151 HHCP-SERV OF PT,EA 15 MIN: HCPCS

## 2020-01-01 PROCEDURE — 36415 COLL VENOUS BLD VENIPUNCTURE: CPT

## 2020-01-01 PROCEDURE — 93925 LOWER EXTREMITY STUDY: CPT

## 2020-01-01 PROCEDURE — 93922 UPR/L XTREMITY ART 2 LEVELS: CPT

## 2020-01-01 PROCEDURE — 84207 ASSAY OF VITAMIN B-6: CPT

## 2020-01-01 PROCEDURE — 71046 X-RAY EXAM CHEST 2 VIEWS: CPT

## 2020-01-01 PROCEDURE — L0150 CERV SEMI-RIG ADJ MOLDED CHN: HCPCS

## 2020-01-01 PROCEDURE — 83825 ASSAY OF MERCURY: CPT

## 2020-01-01 PROCEDURE — 700101 HCHG RX REV CODE 250: Performed by: ANESTHESIOLOGY

## 2020-01-01 PROCEDURE — 80053 COMPREHEN METABOLIC PANEL: CPT

## 2020-01-01 PROCEDURE — A9270 NON-COVERED ITEM OR SERVICE: HCPCS | Performed by: ANESTHESIOLOGY

## 2020-01-01 PROCEDURE — 95937 NEUROMUSCULAR JUNCTION TEST: CPT | Performed by: NEUROLOGICAL SURGERY

## 2020-01-01 PROCEDURE — 95940 IONM IN OPERATNG ROOM 15 MIN: CPT | Performed by: NEUROLOGICAL SURGERY

## 2020-01-01 PROCEDURE — G0495 RN CARE TRAIN/EDU IN HH: HCPCS

## 2020-01-01 PROCEDURE — 95939 C MOTOR EVOKED UPR&LWR LIMBS: CPT | Performed by: NEUROLOGICAL SURGERY

## 2020-01-01 PROCEDURE — G0152 HHCP-SERV OF OT,EA 15 MIN: HCPCS

## 2020-01-01 PROCEDURE — 72141 MRI NECK SPINE W/O DYE: CPT

## 2020-01-01 PROCEDURE — 80048 BASIC METABOLIC PNL TOTAL CA: CPT

## 2020-01-01 PROCEDURE — 95938 SOMATOSENSORY TESTING: CPT | Performed by: NEUROLOGICAL SURGERY

## 2020-01-01 PROCEDURE — G0493 RN CARE EA 15 MIN HH/HOSPICE: HCPCS

## 2020-01-01 PROCEDURE — 160036 HCHG PACU - EA ADDL 30 MINS PHASE I: Performed by: NEUROLOGICAL SURGERY

## 2020-01-01 PROCEDURE — 72146 MRI CHEST SPINE W/O DYE: CPT

## 2020-01-01 PROCEDURE — A9270 NON-COVERED ITEM OR SERVICE: HCPCS | Performed by: NEUROLOGICAL SURGERY

## 2020-01-01 PROCEDURE — 97161 PT EVAL LOW COMPLEX 20 MIN: CPT

## 2020-01-01 PROCEDURE — 82607 VITAMIN B-12: CPT

## 2020-01-01 PROCEDURE — 500885 HCHG PACK, JACKSON TABLE: Performed by: NEUROLOGICAL SURGERY

## 2020-01-01 PROCEDURE — 700101 HCHG RX REV CODE 250: Performed by: PHYSICIAN ASSISTANT

## 2020-01-01 PROCEDURE — 83655 ASSAY OF LEAD: CPT

## 2020-01-01 PROCEDURE — 160042 HCHG SURGERY MINUTES - EA ADDL 1 MIN LEVEL 5: Performed by: NEUROLOGICAL SURGERY

## 2020-01-01 PROCEDURE — 110371 HCHG SHELL REV 272: Performed by: NEUROLOGICAL SURGERY

## 2020-01-01 PROCEDURE — 82550 ASSAY OF CK (CPK): CPT

## 2020-01-01 PROCEDURE — 0RG7071 FUSION OF 2 TO 7 THORACIC VERTEBRAL JOINTS WITH AUTOLOGOUS TISSUE SUBSTITUTE, POSTERIOR APPROACH, POSTERIOR COLUMN, OPEN APPROACH: ICD-10-PCS | Performed by: NEUROLOGICAL SURGERY

## 2020-01-01 DEVICE — SCREW MAS  SOLERA 5.5 X 40MM (1TCX8+3TCX8=32): Type: IMPLANTABLE DEVICE | Site: BACK | Status: FUNCTIONAL

## 2020-01-01 DEVICE — IMPLANTABLE DEVICE: Type: IMPLANTABLE DEVICE | Site: BACK | Status: FUNCTIONAL

## 2020-01-01 DEVICE — ROD STRAIGHT TITANIUM ALLOY ROD 5.5 X 500MM (1TCX3+3TCX2=9): Type: IMPLANTABLE DEVICE | Site: BACK | Status: FUNCTIONAL

## 2020-01-01 DEVICE — ORTHOBLEND 5CC: Type: IMPLANTABLE DEVICE | Site: BACK | Status: FUNCTIONAL

## 2020-01-01 DEVICE — GRAFT BONE MAGNIFUSE 1X10CM: Type: IMPLANTABLE DEVICE | Site: BACK | Status: FUNCTIONAL

## 2020-01-01 DEVICE — SCREW MAS  SOLERA 6.5 X 40MM (1TCX16+3TCX8=40): Type: IMPLANTABLE DEVICE | Site: BACK | Status: FUNCTIONAL

## 2020-01-01 DEVICE — SCREW MAS  SOLERA 6.5 X 45MM (1TCX16+3TCX8=40): Type: IMPLANTABLE DEVICE | Site: BACK | Status: FUNCTIONAL

## 2020-01-01 DEVICE — SCREW SOLERA SET SCREW (1TCX40+3TCX21+2TCX10=123): Type: IMPLANTABLE DEVICE | Site: BACK | Status: FUNCTIONAL

## 2020-01-01 RX ORDER — AMOXICILLIN 250 MG
1 CAPSULE ORAL
Status: DISCONTINUED | OUTPATIENT
Start: 2020-01-01 | End: 2020-01-01 | Stop reason: HOSPADM

## 2020-01-01 RX ORDER — ONDANSETRON 2 MG/ML
4 INJECTION INTRAMUSCULAR; INTRAVENOUS
Status: DISCONTINUED | OUTPATIENT
Start: 2020-01-01 | End: 2020-01-01 | Stop reason: HOSPADM

## 2020-01-01 RX ORDER — OXYCODONE HYDROCHLORIDE 5 MG/1
5 TABLET ORAL
Status: DISCONTINUED | OUTPATIENT
Start: 2020-01-01 | End: 2020-01-01 | Stop reason: HOSPADM

## 2020-01-01 RX ORDER — MULTIVIT WITH MINERALS/LUTEIN
1000 TABLET ORAL DAILY
COMMUNITY
End: 2021-01-01

## 2020-01-01 RX ORDER — DIAZEPAM 5 MG/1
5 TABLET ORAL EVERY 4 HOURS PRN
Status: DISCONTINUED | OUTPATIENT
Start: 2020-01-01 | End: 2020-01-01 | Stop reason: HOSPADM

## 2020-01-01 RX ORDER — CEFAZOLIN SODIUM 2 G/100ML
2 INJECTION, SOLUTION INTRAVENOUS EVERY 8 HOURS
Status: COMPLETED | OUTPATIENT
Start: 2020-01-01 | End: 2020-01-01

## 2020-01-01 RX ORDER — OXYCODONE HCL 5 MG/5 ML
10 SOLUTION, ORAL ORAL
Status: COMPLETED | OUTPATIENT
Start: 2020-01-01 | End: 2020-01-01

## 2020-01-01 RX ORDER — METHOCARBAMOL 750 MG/1
750 TABLET, FILM COATED ORAL EVERY 8 HOURS PRN
Status: DISCONTINUED | OUTPATIENT
Start: 2020-01-01 | End: 2020-01-01 | Stop reason: HOSPADM

## 2020-01-01 RX ORDER — CEFAZOLIN SODIUM 1 G/3ML
INJECTION, POWDER, FOR SOLUTION INTRAMUSCULAR; INTRAVENOUS
Status: DISCONTINUED | OUTPATIENT
Start: 2020-01-01 | End: 2020-01-01 | Stop reason: HOSPADM

## 2020-01-01 RX ORDER — DEXMEDETOMIDINE HYDROCHLORIDE 100 UG/ML
INJECTION, SOLUTION INTRAVENOUS PRN
Status: DISCONTINUED | OUTPATIENT
Start: 2020-01-01 | End: 2020-01-01 | Stop reason: HOSPADM

## 2020-01-01 RX ORDER — ONDANSETRON 4 MG/1
4 TABLET, ORALLY DISINTEGRATING ORAL EVERY 4 HOURS PRN
Status: DISCONTINUED | OUTPATIENT
Start: 2020-01-01 | End: 2020-01-01 | Stop reason: HOSPADM

## 2020-01-01 RX ORDER — ONDANSETRON 2 MG/ML
4 INJECTION INTRAMUSCULAR; INTRAVENOUS EVERY 4 HOURS PRN
Status: DISCONTINUED | OUTPATIENT
Start: 2020-01-01 | End: 2020-01-01 | Stop reason: HOSPADM

## 2020-01-01 RX ORDER — DOCUSATE SODIUM 100 MG/1
100 CAPSULE, LIQUID FILLED ORAL 2 TIMES DAILY
Status: DISCONTINUED | OUTPATIENT
Start: 2020-01-01 | End: 2020-01-01 | Stop reason: HOSPADM

## 2020-01-01 RX ORDER — CYCLOBENZAPRINE HCL 10 MG
10 TABLET ORAL EVERY 8 HOURS PRN
Status: DISCONTINUED | OUTPATIENT
Start: 2020-01-01 | End: 2020-01-01 | Stop reason: HOSPADM

## 2020-01-01 RX ORDER — ENEMA 19; 7 G/133ML; G/133ML
1 ENEMA RECTAL
Status: DISCONTINUED | OUTPATIENT
Start: 2020-01-01 | End: 2020-01-01 | Stop reason: HOSPADM

## 2020-01-01 RX ORDER — OXYCODONE HYDROCHLORIDE AND ACETAMINOPHEN 5; 325 MG/1; MG/1
1-2 TABLET ORAL EVERY 4 HOURS PRN
Qty: 56 TAB | Refills: 0 | Status: SHIPPED | OUTPATIENT
Start: 2020-01-01 | End: 2020-01-01

## 2020-01-01 RX ORDER — HYDROMORPHONE HYDROCHLORIDE 1 MG/ML
0.5 INJECTION, SOLUTION INTRAMUSCULAR; INTRAVENOUS; SUBCUTANEOUS
Status: DISCONTINUED | OUTPATIENT
Start: 2020-01-01 | End: 2020-01-01 | Stop reason: HOSPADM

## 2020-01-01 RX ORDER — METHOCARBAMOL 750 MG/1
750 TABLET, FILM COATED ORAL 4 TIMES DAILY
Qty: 28 TAB | Refills: 1 | Status: SHIPPED | OUTPATIENT
Start: 2020-01-01 | End: 2020-01-01

## 2020-01-01 RX ORDER — POLYETHYLENE GLYCOL 3350 17 G/17G
1 POWDER, FOR SOLUTION ORAL 2 TIMES DAILY PRN
Status: DISCONTINUED | OUTPATIENT
Start: 2020-01-01 | End: 2020-01-01 | Stop reason: HOSPADM

## 2020-01-01 RX ORDER — SODIUM CHLORIDE, SODIUM LACTATE, POTASSIUM CHLORIDE, AND CALCIUM CHLORIDE .6; .31; .03; .02 G/100ML; G/100ML; G/100ML; G/100ML
IRRIGANT IRRIGATION
Status: DISCONTINUED | OUTPATIENT
Start: 2020-01-01 | End: 2020-01-01 | Stop reason: HOSPADM

## 2020-01-01 RX ORDER — LISINOPRIL AND HYDROCHLOROTHIAZIDE 12.5; 1 MG/1; MG/1
1 TABLET ORAL DAILY
Status: DISCONTINUED | OUTPATIENT
Start: 2020-01-01 | End: 2020-01-01

## 2020-01-01 RX ORDER — OXYCODONE HYDROCHLORIDE 10 MG/1
10 TABLET ORAL
Status: DISCONTINUED | OUTPATIENT
Start: 2020-01-01 | End: 2020-01-01 | Stop reason: HOSPADM

## 2020-01-01 RX ORDER — SODIUM CHLORIDE, SODIUM LACTATE, POTASSIUM CHLORIDE, CALCIUM CHLORIDE 600; 310; 30; 20 MG/100ML; MG/100ML; MG/100ML; MG/100ML
INJECTION, SOLUTION INTRAVENOUS CONTINUOUS
Status: DISCONTINUED | OUTPATIENT
Start: 2020-01-01 | End: 2020-01-01 | Stop reason: HOSPADM

## 2020-01-01 RX ORDER — ACETAMINOPHEN 500 MG
1000 TABLET ORAL EVERY 6 HOURS
Status: DISCONTINUED | OUTPATIENT
Start: 2020-01-01 | End: 2020-01-01 | Stop reason: HOSPADM

## 2020-01-01 RX ORDER — LABETALOL HYDROCHLORIDE 5 MG/ML
10 INJECTION, SOLUTION INTRAVENOUS
Status: DISCONTINUED | OUTPATIENT
Start: 2020-01-01 | End: 2020-01-01 | Stop reason: HOSPADM

## 2020-01-01 RX ORDER — HYDROCHLOROTHIAZIDE 12.5 MG/1
12.5 TABLET ORAL
Status: DISCONTINUED | OUTPATIENT
Start: 2020-01-01 | End: 2020-01-01 | Stop reason: HOSPADM

## 2020-01-01 RX ORDER — PHENYLEPHRINE HCL IN 0.9% NACL 0.5 MG/5ML
SYRINGE (ML) INTRAVENOUS PRN
Status: DISCONTINUED | OUTPATIENT
Start: 2020-01-01 | End: 2020-01-01 | Stop reason: SURG

## 2020-01-01 RX ORDER — ALPRAZOLAM 0.25 MG/1
0.25 TABLET ORAL NIGHTLY PRN
Status: DISCONTINUED | OUTPATIENT
Start: 2020-01-01 | End: 2020-01-01 | Stop reason: HOSPADM

## 2020-01-01 RX ORDER — OMEPRAZOLE 20 MG/1
20 CAPSULE, DELAYED RELEASE ORAL DAILY
Status: DISCONTINUED | OUTPATIENT
Start: 2020-01-01 | End: 2020-01-01 | Stop reason: HOSPADM

## 2020-01-01 RX ORDER — CEFAZOLIN SODIUM 1 G/3ML
INJECTION, POWDER, FOR SOLUTION INTRAMUSCULAR; INTRAVENOUS PRN
Status: DISCONTINUED | OUTPATIENT
Start: 2020-01-01 | End: 2020-01-01 | Stop reason: SURG

## 2020-01-01 RX ORDER — LISINOPRIL 10 MG/1
10 TABLET ORAL
Status: DISCONTINUED | OUTPATIENT
Start: 2020-01-01 | End: 2020-01-01 | Stop reason: HOSPADM

## 2020-01-01 RX ORDER — BUPIVACAINE HYDROCHLORIDE AND EPINEPHRINE 5; 5 MG/ML; UG/ML
INJECTION, SOLUTION PERINEURAL
Status: DISCONTINUED | OUTPATIENT
Start: 2020-01-01 | End: 2020-01-01 | Stop reason: HOSPADM

## 2020-01-01 RX ORDER — LIDOCAINE HYDROCHLORIDE 40 MG/ML
SOLUTION TOPICAL PRN
Status: DISCONTINUED | OUTPATIENT
Start: 2020-01-01 | End: 2020-01-01 | Stop reason: SURG

## 2020-01-01 RX ORDER — SODIUM CHLORIDE, SODIUM LACTATE, POTASSIUM CHLORIDE, CALCIUM CHLORIDE 600; 310; 30; 20 MG/100ML; MG/100ML; MG/100ML; MG/100ML
INJECTION, SOLUTION INTRAVENOUS CONTINUOUS
Status: ACTIVE | OUTPATIENT
Start: 2020-01-01 | End: 2020-01-01

## 2020-01-01 RX ORDER — OXYCODONE HCL 5 MG/5 ML
5 SOLUTION, ORAL ORAL
Status: COMPLETED | OUTPATIENT
Start: 2020-01-01 | End: 2020-01-01

## 2020-01-01 RX ORDER — HALOPERIDOL 5 MG/ML
1 INJECTION INTRAMUSCULAR
Status: DISCONTINUED | OUTPATIENT
Start: 2020-01-01 | End: 2020-01-01 | Stop reason: HOSPADM

## 2020-01-01 RX ORDER — DIPHENHYDRAMINE HCL 25 MG
25 TABLET ORAL EVERY 6 HOURS PRN
Status: DISCONTINUED | OUTPATIENT
Start: 2020-01-01 | End: 2020-01-01 | Stop reason: HOSPADM

## 2020-01-01 RX ORDER — CALCIUM CARBONATE 500 MG/1
500 TABLET, CHEWABLE ORAL 2 TIMES DAILY
Status: DISCONTINUED | OUTPATIENT
Start: 2020-01-01 | End: 2020-01-01 | Stop reason: HOSPADM

## 2020-01-01 RX ORDER — SUFENTANIL CITRATE 50 UG/ML
INJECTION EPIDURAL; INTRAVENOUS PRN
Status: DISCONTINUED | OUTPATIENT
Start: 2020-01-01 | End: 2020-01-01 | Stop reason: SURG

## 2020-01-01 RX ORDER — PHENYLEPHRINE HYDROCHLORIDE 10 MG/ML
INJECTION, SOLUTION INTRAMUSCULAR; INTRAVENOUS; SUBCUTANEOUS PRN
Status: DISCONTINUED | OUTPATIENT
Start: 2020-01-01 | End: 2020-01-01

## 2020-01-01 RX ORDER — MAGNESIUM HYDROXIDE 1200 MG/15ML
LIQUID ORAL
Status: COMPLETED | OUTPATIENT
Start: 2020-01-01 | End: 2020-01-01

## 2020-01-01 RX ORDER — AMOXICILLIN 250 MG
1 CAPSULE ORAL NIGHTLY
Status: DISCONTINUED | OUTPATIENT
Start: 2020-01-01 | End: 2020-01-01 | Stop reason: HOSPADM

## 2020-01-01 RX ORDER — DIPHENHYDRAMINE HYDROCHLORIDE 50 MG/ML
12.5 INJECTION INTRAMUSCULAR; INTRAVENOUS
Status: DISCONTINUED | OUTPATIENT
Start: 2020-01-01 | End: 2020-01-01 | Stop reason: HOSPADM

## 2020-01-01 RX ORDER — BISACODYL 10 MG
10 SUPPOSITORY, RECTAL RECTAL
Status: DISCONTINUED | OUTPATIENT
Start: 2020-01-01 | End: 2020-01-01 | Stop reason: HOSPADM

## 2020-01-01 RX ORDER — DEXTROSE MONOHYDRATE, SODIUM CHLORIDE, AND POTASSIUM CHLORIDE 50; 1.49; 9 G/1000ML; G/1000ML; G/1000ML
INJECTION, SOLUTION INTRAVENOUS CONTINUOUS
Status: DISCONTINUED | OUTPATIENT
Start: 2020-01-01 | End: 2020-01-01 | Stop reason: HOSPADM

## 2020-01-01 RX ORDER — DIPHENHYDRAMINE HYDROCHLORIDE 50 MG/ML
25 INJECTION INTRAMUSCULAR; INTRAVENOUS EVERY 6 HOURS PRN
Status: DISCONTINUED | OUTPATIENT
Start: 2020-01-01 | End: 2020-01-01 | Stop reason: HOSPADM

## 2020-01-01 RX ADMIN — OXYCODONE HYDROCHLORIDE 10 MG: 5 SOLUTION ORAL at 17:19

## 2020-01-01 RX ADMIN — LISINOPRIL 10 MG: 10 TABLET ORAL at 09:41

## 2020-01-01 RX ADMIN — ACETAMINOPHEN 1000 MG: 500 TABLET ORAL at 11:33

## 2020-01-01 RX ADMIN — MAGNESIUM HYDROXIDE 30 ML: 400 SUSPENSION ORAL at 06:27

## 2020-01-01 RX ADMIN — LIDOCAINE HYDROCHLORIDE 160 MG: 40 SOLUTION TOPICAL at 14:23

## 2020-01-01 RX ADMIN — ANTACID TABLETS 500 MG: 500 TABLET, CHEWABLE ORAL at 06:23

## 2020-01-01 RX ADMIN — ACETAMINOPHEN 1000 MG: 500 TABLET ORAL at 18:49

## 2020-01-01 RX ADMIN — ACETAMINOPHEN 1000 MG: 500 TABLET ORAL at 22:39

## 2020-01-01 RX ADMIN — Medication 200 MCG: at 15:03

## 2020-01-01 RX ADMIN — OXYCODONE HYDROCHLORIDE 10 MG: 10 TABLET ORAL at 10:31

## 2020-01-01 RX ADMIN — ACETAMINOPHEN 1000 MG: 500 TABLET ORAL at 16:47

## 2020-01-01 RX ADMIN — METHOCARBAMOL 750 MG: 750 TABLET ORAL at 09:42

## 2020-01-01 RX ADMIN — ATROPINE SULFATE 0.4 MG: 0.4 INJECTION, SOLUTION INTRAMUSCULAR; INTRAVENOUS; SUBCUTANEOUS at 15:16

## 2020-01-01 RX ADMIN — Medication 200 MCG: at 14:58

## 2020-01-01 RX ADMIN — FENTANYL CITRATE 50 MCG: 50 INJECTION INTRAMUSCULAR; INTRAVENOUS at 17:31

## 2020-01-01 RX ADMIN — OXYCODONE HYDROCHLORIDE 10 MG: 10 TABLET ORAL at 04:10

## 2020-01-01 RX ADMIN — DEXMEDETOMIDINE HYDROCHLORIDE 30 MCG: 100 INJECTION, SOLUTION INTRAVENOUS at 14:20

## 2020-01-01 RX ADMIN — PROPOFOL 70 MG: 10 INJECTION, EMULSION INTRAVENOUS at 14:28

## 2020-01-01 RX ADMIN — FENTANYL CITRATE 50 MCG: 50 INJECTION INTRAMUSCULAR; INTRAVENOUS at 17:17

## 2020-01-01 RX ADMIN — CEFAZOLIN SODIUM 2 G: 2 INJECTION, SOLUTION INTRAVENOUS at 06:02

## 2020-01-01 RX ADMIN — OXYCODONE HYDROCHLORIDE 10 MG: 10 TABLET ORAL at 14:45

## 2020-01-01 RX ADMIN — PROPOFOL 80 MG: 10 INJECTION, EMULSION INTRAVENOUS at 14:22

## 2020-01-01 RX ADMIN — ANTACID TABLETS 500 MG: 500 TABLET, CHEWABLE ORAL at 09:43

## 2020-01-01 RX ADMIN — POVIDONE-IODINE 15 ML: 10 SOLUTION TOPICAL at 13:14

## 2020-01-01 RX ADMIN — DOCUSATE SODIUM 100 MG: 100 CAPSULE ORAL at 18:50

## 2020-01-01 RX ADMIN — ACETAMINOPHEN 1000 MG: 500 TABLET ORAL at 06:22

## 2020-01-01 RX ADMIN — DOCUSATE SODIUM 100 MG: 100 CAPSULE ORAL at 16:47

## 2020-01-01 RX ADMIN — OMEPRAZOLE 20 MG: 20 CAPSULE, DELAYED RELEASE ORAL at 18:49

## 2020-01-01 RX ADMIN — HYDROCHLOROTHIAZIDE 12.5 MG: 12.5 TABLET ORAL at 09:41

## 2020-01-01 RX ADMIN — ACETAMINOPHEN 1000 MG: 500 TABLET ORAL at 12:22

## 2020-01-01 RX ADMIN — Medication 200 MCG: at 14:35

## 2020-01-01 RX ADMIN — DEXMEDETOMIDINE HYDROCHLORIDE 30 MCG: 100 INJECTION, SOLUTION INTRAVENOUS at 14:30

## 2020-01-01 RX ADMIN — SODIUM CHLORIDE, POTASSIUM CHLORIDE, SODIUM LACTATE AND CALCIUM CHLORIDE: 600; 310; 30; 20 INJECTION, SOLUTION INTRAVENOUS at 13:15

## 2020-01-01 RX ADMIN — HYDROCHLOROTHIAZIDE 12.5 MG: 12.5 TABLET ORAL at 06:23

## 2020-01-01 RX ADMIN — OXYCODONE HYDROCHLORIDE 10 MG: 10 TABLET ORAL at 18:49

## 2020-01-01 RX ADMIN — DOCUSATE SODIUM 100 MG: 100 CAPSULE ORAL at 09:42

## 2020-01-01 RX ADMIN — CEFAZOLIN 2 G: 330 INJECTION, POWDER, FOR SOLUTION INTRAMUSCULAR; INTRAVENOUS at 14:23

## 2020-01-01 RX ADMIN — CEFAZOLIN SODIUM 2 G: 2 INJECTION, SOLUTION INTRAVENOUS at 21:48

## 2020-01-01 RX ADMIN — ANTACID TABLETS 500 MG: 500 TABLET, CHEWABLE ORAL at 16:47

## 2020-01-01 RX ADMIN — OXYCODONE HYDROCHLORIDE 10 MG: 10 TABLET ORAL at 16:46

## 2020-01-01 RX ADMIN — METHOCARBAMOL 750 MG: 750 TABLET ORAL at 11:32

## 2020-01-01 RX ADMIN — DOCUSATE SODIUM 50 MG AND SENNOSIDES 8.6 MG 1 TABLET: 8.6; 5 TABLET, FILM COATED ORAL at 20:33

## 2020-01-01 RX ADMIN — DOCUSATE SODIUM 100 MG: 100 CAPSULE ORAL at 06:23

## 2020-01-01 RX ADMIN — ANTACID TABLETS 500 MG: 500 TABLET, CHEWABLE ORAL at 18:50

## 2020-01-01 RX ADMIN — PHENYLEPHRINE HYDROCHLORIDE 50 MCG/MIN: 10 INJECTION INTRAVENOUS at 15:05

## 2020-01-01 RX ADMIN — Medication 200 MCG: at 14:47

## 2020-01-01 RX ADMIN — LIDOCAINE HYDROCHLORIDE 0.5 ML: 10 INJECTION, SOLUTION EPIDURAL; INFILTRATION; INTRACAUDAL at 13:14

## 2020-01-01 RX ADMIN — SUFENTANIL CITRATE 25 MCG: 50 INJECTION, SOLUTION EPIDURAL; INTRAVENOUS at 14:20

## 2020-01-01 RX ADMIN — OXYCODONE HYDROCHLORIDE 10 MG: 10 TABLET ORAL at 06:23

## 2020-01-01 RX ADMIN — OMEPRAZOLE 20 MG: 20 CAPSULE, DELAYED RELEASE ORAL at 09:41

## 2020-01-01 RX ADMIN — OMEPRAZOLE 20 MG: 20 CAPSULE, DELAYED RELEASE ORAL at 06:23

## 2020-01-01 RX ADMIN — ACETAMINOPHEN 1000 MG: 500 TABLET ORAL at 00:46

## 2020-01-01 RX ADMIN — POTASSIUM CHLORIDE, DEXTROSE MONOHYDRATE AND SODIUM CHLORIDE: 150; 5; 900 INJECTION, SOLUTION INTRAVENOUS at 18:51

## 2020-01-01 RX ADMIN — SODIUM CHLORIDE, POTASSIUM CHLORIDE, SODIUM LACTATE AND CALCIUM CHLORIDE: 600; 310; 30; 20 INJECTION, SOLUTION INTRAVENOUS at 16:53

## 2020-01-01 RX ADMIN — SUFENTANIL CITRATE 25 MCG: 50 INJECTION, SOLUTION EPIDURAL; INTRAVENOUS at 14:22

## 2020-01-01 RX ADMIN — LISINOPRIL 10 MG: 10 TABLET ORAL at 06:23

## 2020-01-01 SDOH — ECONOMIC STABILITY: HOUSING INSECURITY
HOME SAFETY: MAIN LIVING AREA IS ON THE SECOND FLOOR, FIRST FLOOR IS THE GARAGE. HAS THROW RUGS THAT NEED TO BE REMOVED AND HAS GAS STOVE.

## 2020-01-01 SDOH — ECONOMIC STABILITY: HOUSING INSECURITY: HOME SAFETY: SPINAL PRECAUTIONS

## 2020-01-01 ASSESSMENT — GAIT ASSESSMENTS
BALANCE AND GAIT SCORE: 18
PATH: 1 - MILD/MODERATE DEVIATION OR USES WALKING AID
ASSISTIVE DEVICE: FRONT WHEEL WALKER
GAIT LEVEL OF ASSIST: SUPERVISED
STEP SYMMETRY: 1 - RIGHT AND LEFT STEP LENGTH APPEAR EQUAL
INITIATION OF GAIT IMMEDIATELY AFTER GO: 1 - NO HESITANCY
WALKING STANCE: 0 - HEELS APART
TRUNK: 0 - MARKED SWAY OR USES WALKING AID
TRUNK SCORE: 0
DISTANCE (FEET): 60
GAIT SCORE: 8
PATH SCORE: 1
STEP CONTINUITY: 1 - STEPS APPEAR CONTINUOUS

## 2020-01-01 ASSESSMENT — COGNITIVE AND FUNCTIONAL STATUS - GENERAL
DRESSING REGULAR UPPER BODY CLOTHING: A LITTLE
MOVING FROM LYING ON BACK TO SITTING ON SIDE OF FLAT BED: A LITTLE
HELP NEEDED FOR BATHING: A LITTLE
DRESSING REGULAR LOWER BODY CLOTHING: A LITTLE
MOVING FROM LYING ON BACK TO SITTING ON SIDE OF FLAT BED: A LITTLE
DAILY ACTIVITIY SCORE: 18
SUGGESTED CMS G CODE MODIFIER DAILY ACTIVITY: CJ
DAILY ACTIVITIY SCORE: 22
TURNING FROM BACK TO SIDE WHILE IN FLAT BAD: A LITTLE
CLIMB 3 TO 5 STEPS WITH RAILING: A LITTLE
PERSONAL GROOMING: A LITTLE
MOVING TO AND FROM BED TO CHAIR: A LITTLE
WALKING IN HOSPITAL ROOM: A LITTLE
SUGGESTED CMS G CODE MODIFIER DAILY ACTIVITY: CK
DRESSING REGULAR UPPER BODY CLOTHING: A LITTLE
HELP NEEDED FOR BATHING: A LITTLE
STANDING UP FROM CHAIR USING ARMS: A LITTLE
MOBILITY SCORE: 23
MOBILITY SCORE: 18
SUGGESTED CMS G CODE MODIFIER MOBILITY: CK
SUGGESTED CMS G CODE MODIFIER MOBILITY: CI
EATING MEALS: A LITTLE
TOILETING: A LITTLE

## 2020-01-01 ASSESSMENT — PAIN DESCRIPTION - PAIN TYPE
TYPE: ACUTE PAIN
TYPE: SURGICAL PAIN
TYPE: SURGICAL PAIN
TYPE: ACUTE PAIN
TYPE: SURGICAL PAIN
TYPE: ACUTE PAIN
TYPE: SURGICAL PAIN
TYPE: ACUTE PAIN
TYPE: SURGICAL PAIN

## 2020-01-01 ASSESSMENT — LIFESTYLE VARIABLES
EVER FELT BAD OR GUILTY ABOUT YOUR DRINKING: NO
HAVE YOU EVER FELT YOU SHOULD CUT DOWN ON YOUR DRINKING: NO
AVERAGE NUMBER OF DAYS PER WEEK YOU HAVE A DRINK CONTAINING ALCOHOL: 0
HOW MANY TIMES IN THE PAST YEAR HAVE YOU HAD 5 OR MORE DRINKS IN A DAY: 0
ON A TYPICAL DAY WHEN YOU DRINK ALCOHOL HOW MANY DRINKS DO YOU HAVE: 0
TOTAL SCORE: 0
EVER HAD A DRINK FIRST THING IN THE MORNING TO STEADY YOUR NERVES TO GET RID OF A HANGOVER: NO
TOTAL SCORE: 0
HAVE PEOPLE ANNOYED YOU BY CRITICIZING YOUR DRINKING: NO
CONSUMPTION TOTAL: NEGATIVE
TOTAL SCORE: 0
ALCOHOL_USE: NO

## 2020-01-01 ASSESSMENT — PATIENT HEALTH QUESTIONNAIRE - PHQ9
CLINICAL INTERPRETATION OF PHQ2 SCORE: 0
1. LITTLE INTEREST OR PLEASURE IN DOING THINGS: 00
CLINICAL INTERPRETATION OF PHQ2 SCORE: 0
1. LITTLE INTEREST OR PLEASURE IN DOING THINGS: NOT AT ALL
2. FEELING DOWN, DEPRESSED, IRRITABLE, OR HOPELESS: NOT AT ALL
SUM OF ALL RESPONSES TO PHQ9 QUESTIONS 1 AND 2: 0
2. FEELING DOWN, DEPRESSED, IRRITABLE, OR HOPELESS: 00

## 2020-01-01 ASSESSMENT — ACTIVITIES OF DAILY LIVING (ADL)
ORAL_CARE_CURRENT_FUNCTION: DEPENDENT
ORAL_CARE_ASSESSED: 1
FEEDING_WITHIN_DEFINED_LIMITS: 1
GROOMING_WITHIN_DEFINED_LIMITS: 1
AMBULATION ASSISTANCE: 1
FEEDING ASSESSED: 1
TELEPHONE USE ASSESSED: 1
TOILETING: 1
USING THE TELPHONE: INDEPENDENT
HOME_HEALTH_OASIS: 00
TOILETING: INDEPENDENT
AMBULATION ASSISTANCE: 1
GROOMING_CURRENT_FUNCTION: INDEPENDENT
DRESSING_UB_CURRENT_FUNCTION: MAXIMUM ASSIST
AMBULATION_DISTANCE/DURATION_TOLERATED: 100 FEET
AMBULATION ASSISTANCE ON FLAT SURFACES: 1
CURRENT_FUNCTION: INDEPENDENT
OASIS_M1830: 05
AMBULATION ASSISTANCE: INDEPENDENT
GROOMING_WITHIN_DEFINED_LIMITS: 1
FEEDING: INDEPENDENT
AMBULATION ASSISTANCE: SUPERVISION
TELEPHONE USE ASSESSED: 1
DRESSING_LB_CURRENT_FUNCTION: MAXIMUM ASSIST
FEEDING_WITHIN_DEFINED_LIMITS: 1
TOILETING: INDEPENDENT
USING THE TELPHONE: INDEPENDENT
OASIS_M1830: 00
LAUNDRY ASSESSED: 1
PHYSICAL TRANSFERS ASSESSED: 1
GROOMING ASSESSED: 1
AMBULATION ASSISTANCE ON FLAT SURFACES: 1
BATHING_WITHIN_DEFINED_LIMITS: 1

## 2020-01-01 ASSESSMENT — BALANCE ASSESSMENTS
ATTEMPTS TO ARISE: 2 - ABLE TO RISE, ONE ATTEMPT
BALANCE SCORE: 10
IMMEDIATE STANDING BALANCE FIRST 5 SECONDS: 1 - STEADY BUT USES WALKER OR OTHER SUPPORT
NUDGED: 1 - STAGGERS, GRABS, CATCHES SELF
ARISES: 1 - ABLE, USES ARMS TO HELP
STANDING BALANCE: 1 - STEADY BUT WIDE STANCE AND USES CANE OR OTHER SUPPORT
ARISING SCORE: 1
NUDGED SCORE: 1
SITTING BALANCE: 1 - STEADY, SAFE
SITTING DOWN: 1 - USES ARMS OR NOT SMOOTH MOTION
EYES CLOSED AT MAXIMUM POSITION NUDGED: 1 - STEADY
TURNING 360 DEGREES STEPS: 0 - DISCONTINUOUS STEPS

## 2020-01-01 ASSESSMENT — FIBROSIS 4 INDEX
FIB4 SCORE: 2.03
FIB4 SCORE: 1.23
FIB4 SCORE: 1.23

## 2020-01-01 ASSESSMENT — ENCOUNTER SYMPTOMS
MUSCLE WEAKNESS: 1
MUSCLE WEAKNESS: 1
NAUSEA: DENIES
VOMITING: DENIES
DEBILITATING PAIN: 1

## 2020-01-01 ASSESSMENT — PAIN SCALES - GENERAL: PAIN_LEVEL: 0

## 2020-02-14 ENCOUNTER — HOSPITAL ENCOUNTER (OUTPATIENT)
Dept: LAB | Facility: MEDICAL CENTER | Age: 80
End: 2020-02-14
Attending: FAMILY MEDICINE
Payer: MEDICARE

## 2020-02-14 LAB — PSA SERPL-MCNC: <0.01 NG/ML (ref 0–4)

## 2020-02-14 PROCEDURE — 36415 COLL VENOUS BLD VENIPUNCTURE: CPT

## 2020-02-14 PROCEDURE — 84153 ASSAY OF PSA TOTAL: CPT

## 2020-09-11 NOTE — ANESTHESIA TIME REPORT
Anesthesia Start and Stop Event Times     Date Time Event    9/11/2020 1357 Ready for Procedure     1419 Anesthesia Start     1656 Anesthesia Stop        Responsible Staff  09/11/20    Name Role Begin End    Anatoly Kc M.D. 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 3PM - 7AM    Comments:

## 2020-09-11 NOTE — ANESTHESIA PROCEDURE NOTES
Airway    Date/Time: 9/11/2020 2:23 PM  Performed by: Anatoly Kc M.D.  Authorized by: Anatoly Kc M.D.     Location:  OR  Urgency:  Elective  Difficult Airway: No    Indications for Airway Management:  Anesthesia      Spontaneous Ventilation: absent    Sedation Level:  Deep  Preoxygenated: Yes    Patient Position:  Sniffing  Mask Difficulty Assessment:  1 - vent by mask  Final Airway Type:  Endotracheal airway  Final Endotracheal Airway:  ETT  Cuffed: Yes    Technique Used for Successful ETT Placement:  Direct laryngoscopy    Insertion Site:  Oral  Blade Type:  Evaristo  Laryngoscope Blade/Videolaryngoscope Blade Size:  4  ETT Size (mm):  8.0  Measured from:  Lips  ETT to Lips (cm):  23  Placement Verified by: auscultation and capnometry    Cormack-Lehane Classification:  Grade IIa - partial view of glottis  Number of Attempts at Approach:  1  Number of Other Approaches Attempted:  0

## 2020-09-11 NOTE — ANESTHESIA POSTPROCEDURE EVALUATION
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 Responsible Provider: Anatoly Kc M.D.    Anesthesia Type: general ASA Status: 2          Final Anesthesia Type: general  Last vitals  BP   Blood Pressure : 133/101(RN notified)    Temp   36.2 °C (97.1 °F)    Pulse   Pulse: 69   Resp   18    SpO2   95 %      Anesthesia Post Evaluation    Patient location during evaluation: PACU  Patient participation: complete - patient participated  Level of consciousness: awake and alert  Pain score: 0    Airway patency: patent  Anesthetic complications: no  Cardiovascular status: hemodynamically stable  Respiratory status: acceptable  Hydration status: euvolemic    PONV: none           Nurse Pain Score: 2 (NPRS)

## 2020-09-11 NOTE — DISCHARGE SUMMARY
Discharge Summary    CHIEF COMPLAINT ON ADMISSION  No chief complaint on file.      Reason for Admission  DISPLACEMENT OF THORACIC INTERVERTEBRAL DISC    Admission Date  9/11/2020    CODE STATUS  Prior    HPI & HOSPITAL COURSE  This is a 80 y.o. male here with thoracic disc herniation. Surgery proceeded without complication. Pt will be transferred to floor and discharged when meeting criteria, cleared by therapy. HH was ordered.         Therefore, he is discharged in good and stable condition to home with close outpatient follow-up.    The patient met 2-midnight criteria for an inpatient stay at the time of discharge.    Discharge Date  Anticipate 9/13/2020 if meeting criteria    FOLLOW UP ITEMS POST DISCHARGE  Keep scheduled apt at [facility], [place] neuro[facility]    DISCHARGE DIAGNOSES  Active Problems:    * No active hospital problems. *  Resolved Problems:    * No resolved hospital problems. *      FOLLOW UP  No future appointments.  No follow-up provider specified.    MEDICATIONS ON DISCHARGE     Medication List      START taking these medications      Instructions   methocarbamol 750 MG Tabs  Commonly known as: ROBAXIN   Take 1 Tab by mouth 4 times a day for 7 days.  Dose: 750 mg     oxyCODONE-acetaminophen 5-325 MG Tabs  Commonly known as: PERCOCET   Doctor's comments: Max 6 tabs per day  Take 1-2 Tabs by mouth every four hours as needed for Severe Pain for up to 7 days.  Dose: 1-2 Tab        CONTINUE taking these medications      Instructions   ALPRAZolam 0.25 MG Tabs  Commonly known as: XANAX   Take 0.25 mg by mouth at bedtime as needed for Sleep.  Dose: 0.25 mg     lisinopril-hydrochlorothiazide 10-12.5 MG per tablet  Commonly known as: PRINZIDE   Take 1 Tab by mouth every day.  Dose: 1 Tab     Melatonin 5 MG Tabs   Take  by mouth. Indications: Trouble Sleeping     omeprazole 20 MG delayed-release capsule  Commonly known as: PRILOSEC   Take 20 mg by mouth every day.  Dose: 20 mg     VITAMIN B12 PO   Take 3 Tabs by  mouth every day.  Dose: 3 Tab     Vitamin C 1000 MG Tabs   Take 1,000 mg by mouth every day.  Dose: 1,000 mg        STOP taking these medications    tramadol 50 MG Tabs  Commonly known as: ULTRAM          Pt will be discharged with percocet 5/325mg 1-2 tabs q4hr prn, max 6 tabs per day, #56, 7d. Robaxin 750mg, 7d, 1RF    Allergies  No Known Allergies    DIET  No orders of the defined types were placed in this encounter.      ACTIVITY  Light duty.  Weight bearing as tolerated    CONSULTATIONS       PROCEDURES   T11-12 laminectomy, T10-L1 fusion for T11-12 discectomy  Dr Armenta, 9/11/2020    LABORATORY  Lab Results   Component Value Date    SODIUM 136 09/09/2020    POTASSIUM 4.1 09/09/2020    CHLORIDE 101 09/09/2020    CO2 21 09/09/2020    GLUCOSE 121 (H) 09/09/2020    BUN 22 09/09/2020    CREATININE 0.80 09/09/2020    CREATININE 1.1 06/16/2005        Lab Results   Component Value Date    WBC 5.9 09/09/2020    HEMOGLOBIN 16.4 09/09/2020    HEMATOCRIT 45.0 09/09/2020    PLATELETCT 318 09/09/2020        Total time of the discharge process exceeds 30 minutes.

## 2020-09-12 NOTE — THERAPY
Occupational Therapy   Initial Evaluation     Patient Name: Adithya Cohn  Age:  80 y.o., Sex:  male  Medical Record #: [id number]  Today's Date: 9/12/2020     Precautions  Precautions: Fall Risk, Spinal / Back Precautions   Comments: TLSO when OOB    Assessment  Patient is 80 y.o. male presents to skilled OT services following T10-L1 fusion, TLSO when oob >5 mins. Pt was able to perform basic self care tasks, functional mobility and t/f's with supervision, required min a for donning/doffing TLSO, post training demonstrates ability to direct spouse to assist with brace management. Pt had no further questions and concerns regarding d/c home once medically cleared.       Plan    Recommend Occupational Therapy for Evaluation only     DC Equipment Recommendations: Tub / Shower Seat  Discharge Recommendations: Recommend home health for continued occupational therapy services        Objective       09/12/20 1128   Prior Living Situation   Prior Services None   Housing / Facility 1 Story Apartment / Condo   Steps Into Home   (1flight)   Steps In Home 0   Bathroom Set up Walk In Shower   Equipment Owned Front-Wheel Walker   Lives with - Patient's Self Care Capacity Spouse   Comments I with ADLs/IADLs baseline. Reports spouse can assist as needed w/ ADLs/IADLs   Prior Level of ADL Function   Self Feeding Independent   Grooming / Hygiene Independent   Bathing Independent   Dressing Independent   Toileting Independent   Prior Level of IADL Function   Medication Management Independent   Laundry Independent   Kitchen Mobility Independent   Finances Independent   Home Management Independent   Shopping Independent   Prior Level Of Mobility Independent Without Device in Home   ADL Assessment   Eating Independent   Grooming Supervision;Standing   Bathing   (discussed modifications and AE)   Upper Body Dressing Minimal Assist  (educated on donning/doffing TLSO, able to direct care )   Lower Body Dressing Supervision  (cross leg  method)   Toileting Supervision   Comments reports walk-in shower very small and might not be able to fit SC. Discussed other modifications including sponge bathing and assist from spouse. Pt able to direct donning/doffing of brace   Functional Mobility   Sit to Stand Supervised   Bed, Chair, Wheelchair Transfer Supervised   Toilet Transfers Supervised   Comments w/FWW   Anticipated Discharge Equipment and Recommendations   DC Equipment Recommendations Tub / Shower Seat

## 2020-09-12 NOTE — PROGRESS NOTES
Neurosurgery Progress Note    Subjective:  States numbness improved  Eating, some ambulation, voiding.  Pain controlled on orals    Exam:  BLE str intact- unable to visualize incision- hvac 135 atnoon- bloody    BP  Min: 98/65  Max: 140/88  Pulse  Av.1  Min: 41  Max: 87  Resp  Av.9  Min: 5  Max: 94  Temp  Av.3 °C (97.3 °F)  Min: 35.9 °C (96.6 °F)  Max: 37.1 °C (98.8 °F)  SpO2  Av.9 %  Min: 90 %  Max: 98 %    No data recorded    Recent Labs     20  1132   WBC 5.9   RBC 4.52*   HEMOGLOBIN 16.4   HEMATOCRIT 45.0   MCV 99.6*   MCH 36.3*   MCHC 36.4*   RDW 55.8*   PLATELETCT 318   MPV 10.9     Recent Labs     20  1132   SODIUM 136   POTASSIUM 4.1   CHLORIDE 101   CO2 21   GLUCOSE 121*   BUN 22   CREATININE 0.80   CALCIUM 10.2     Recent Labs     20  1132   APTT 31.3   INR 0.98           Intake/Output       20 0700 - 20 0659 20 07 - 20 0659      [id number] 3611-3582 Total 7904-1402 6242-5531 Total       Intake    P.O.  400  400 800  --  -- --    P.O. 400 400 800 -- -- --    I.V.  1025  -- 1025  --  -- --    Volume (mL) (lactated ringers infusion) 1025 -- 1025 -- -- --    Total Intake 9939 430 2594 -- -- --       Output    Urine  --  100 100  --  -- --    Number of Times Voided -- 1 x 1 x -- -- --    Urine Void (mL) -- 100 100 -- -- --    Drains  100  340 440  --  -- --    Output (mL) (Closed/Suction Drain 1 Back Hemovac 10 Fr.) 100 340 440 -- -- --    Blood  250  -- 250  --  -- --    Est. Blood Loss 250 -- 250 -- -- --    Total Output 350 440 790 -- -- --       Net I/O     1075 -40 1035 -- -- --            Intake/Output Summary (Last 24 hours) at 2020 1128  Last data filed at 2020 0400  Gross per 24 hour   Intake 1825 ml   Output 790 ml   Net 1035 ml            • ALPRAZolam  0.25 mg HS PRN   • omeprazole  20 mg DAILY   • Pharmacy Consult Request  1 Each PHARMACY TO DOSE   • MD ALERT...DO NOT ADMINISTER NSAIDS or ASPIRIN unless ORDERED By Neurosurgery   1 Each PRN   • docusate sodium  100 mg BID   • senna-docusate  1 Tab Nightly   • senna-docusate  1 Tab Q24HRS PRN   • polyethylene glycol/lytes  1 Packet BID PRN   • magnesium hydroxide  30 mL QDAY PRN   • bisacodyl  10 mg Q24HRS PRN   • fleet  1 Each Once PRN   • dextrose 5 % and 0.9 % NaCl with KCl 20 mEq   Continuous   • acetaminophen  1,000 mg Q6HRS   • oxyCODONE immediate-release  5 mg Q3HRS PRN   • oxyCODONE immediate release  10 mg Q3HRS PRN   • HYDROmorphone  0.5 mg Q3HRS PRN   • diphenhydrAMINE  25 mg Q6HRS PRN    Or   • diphenhydrAMINE  25 mg Q6HRS PRN   • ondansetron  4 mg Q4HRS PRN   • ondansetron  4 mg Q4HRS PRN   • methocarbamol  750 mg Q8HRS PRN    Or   • cyclobenzaprine  10 mg Q8HRS PRN    Or   • diazePAM  5 mg Q4HRS PRN   • labetalol  10 mg Q HOUR PRN   • benzocaine-menthol  1 Lozenge Q2HRS PRN   • calcium carbonate  500 mg BID   • lisinopril  10 mg Q DAY    And   • hydroCHLOROthiazide  12.5 mg Q DAY       Assessment and Plan:  POD #[id number] fusion  Prophylactic anticoagulation: no         Start date/time: tbd    Plan:  High hvac output- watch  PTOT- continue mobilization, will order HH  Continue pain control

## 2020-09-12 NOTE — THERAPY
Physical Therapy   Initial Evaluation     Patient Name: Adithya Cohn  Age:  80 y.o., Sex:  male  Medical Record #: [id number]  Today's Date: 9/12/2020     Precautions: Fall Risk, Spinal / Back Precautions     Assessment  Patient is 80 y.o. male with a diagnosis of intraoperatively with thoracic myelopathy.  Patient demonstrates household level functional mobility status.  Educated patient on spinal precautions, and importance of short frequent ambulation as well as home health PT.  Patient does have LE weakness, which will not limit him from going home, but he would benefit from eventual outpatient physical therapy.  PT will not follow.        09/12/20 1210   Precautions   Precautions Fall Risk;Spinal / Back Precautions    Comments TLSO when OOB   Prior Living Situation   Prior Services None   Housing / Facility 1 Story Apartment / Condo   Steps Into Home   (1 flight)   Steps In Home 0   Equipment Owned Front-Wheel Walker   Lives with - Patient's Self Care Capacity Spouse   Prior Level of Functional Mobility   Bed Mobility Independent   Transfer Status Independent   Ambulation Independent   Distance Ambulation (Feet) 150   Assistive Devices Used None   Stairs Independent   History of Falls   History of Falls No   Cognition    Cognition / Consciousness WDL   Level of Consciousness Alert   Passive ROM Lower Body   Passive ROM Lower Body WDL   Active ROM Lower Body    Active ROM Lower Body  WDL   Strength Lower Body   Lower Body Strength  WDL   Sensation Lower Body   Lower Extremity Sensation   WDL   Balance Assessment   Sitting Balance (Static) Good   Sitting Balance (Dynamic) Good   Standing Balance (Static) Fair +   Standing Balance (Dynamic) Fair +   Weight Shift Sitting Good   Weight Shift Standing Fair   Comments FWW   Gait Analysis   Gait Level Of Assist Supervised   Assistive Device Front Wheel Walker   Distance (Feet) 60   # of Times Distance was Traveled 2   # of Stairs Climbed 4   Level of Assist with  Stairs Supervised   Weight Bearing Status fwb   Comments clearly patient able to ambulate flight, PT stopped as stairs are very easy for patient    Bed Mobility    Supine to Sit Supervised   Sit to Supine Supervised   Scooting Supervised   Functional Mobility   Sit to Stand Supervised   Bed, Chair, Wheelchair Transfer Supervised   How much difficulty does the patient currently have...   Turning over in bed (including adjusting bedclothes, sheets and blankets)? 4   Sitting down on and standing up from a chair with arms (e.g., wheelchair, bedside commode, etc.) 3   Moving from lying on back to sitting on the side of the bed? 4   How much help from another person does the patient currently need...   Moving to and from a bed to a chair (including a wheelchair)? 4   Need to walk in a hospital room? 4   Climbing 3-5 steps with a railing? 4   6 clicks Mobility Score 23   Education Group   Education Provided Role of Physical Therapist;Gait Training;Use of Assistive Device;Spine Precautions   Spine Precautions Patient Response Patient;Acceptance;Explanation;Demonstration;Verbal Demonstration;Action Demonstration   Role of Physical Therapist Patient Response Patient;Acceptance;Explanation;Demonstration;Verbal Demonstration;Action Demonstration   Gait Training Patient Response Patient;Acceptance;Explanation;Demonstration;Verbal Demonstration;Action Demonstration   Use of Assistive Device Patient Response Patient;Acceptance;Explanation;Demonstration;Verbal Demonstration;Action Demonstration   Anticipated Discharge Equipment and Recommendations   DC Equipment Recommendations None       Plan    Recommend Physical Therapy for Evaluation only for the following treatments:  Bed Mobility, Community Re-integration, Gait Training, Neuro Re-Education / Balance, Self Care/Home Evaluation, Stair Training, Therapeutic Activities and Therapeutic Exercises    DC Equipment Recommendations: None  Discharge Recommendations: Recommend home  [facility] for continued physical therapy services

## 2020-09-12 NOTE — FACE TO FACE
Face to Face Supporting Documentation - Home Health    The encounter with this patient was in whole or in part the primary reason for home health admission.    Date of encounter:   Patient:                    MRN:                       YOB: 2018  Adithya Cohn  [id number]  1940     Home health to see patient for:  Skilled Nursing care for assessment, interventions & education and Physical Therapy evaluation and treatment    Skilled need for:  Surgical Aftercare post op    Skilled nursing interventions to include:  Comment: posto p    Homebound status evidenced by:  Need the aid of supportive devices such as crutches, canes, wheelchairs or walkers or Needs the assistance of another person in order to leave the home. Leaving home requires a considerable and taxing effort. There is a normal inability to leave the home.    Community Physician to provide follow up care: Rikki YOO M.D.     Optional Interventions? No      I certify the face to face encounter for this home health care referral meets the CMS requirements and the encounter/clinical assessment with the patient was, in whole, or in part, for the medical condition(s) listed above, which is the primary reason for home health care. Based on my clinical findings: the service(s) are medically necessary, support the need for home health care, and the homebound criteria are met.  I certify that this patient has had a face to face encounter by myself.  VIANEY Cardenas. - NPI: [id number]

## 2020-09-12 NOTE — PROGRESS NOTES
Report given and RN assumed care. Patient A/Ox4. VSS. Patient denies pain at this time. Assessment completed. Back dressing CDI. Hemovac present. Personal belongings and call light within reach. Treaded socks in place. Will continue to monitor patient.

## 2020-09-12 NOTE — CARE PLAN
Problem: Safety  Goal: Will remain free from falls  Outcome: PROGRESSING AS EXPECTED   Patient remains free from falls, patient calls for assistance, treaded socks in place, call light within reach    Problem: Infection  Goal: Will remain free from infection  Outcome: PROGRESSING AS EXPECTED   Patient remains afebrile, VS q 4 hours, IV abx as ordered

## 2020-09-12 NOTE — CARE PLAN
Problem: Safety  Goal: Will remain free from injury  Outcome: PROGRESSING AS EXPECTED   Pt belongings within reach. Bed locked and in lowest position and uses call light appropriately. Pt wearing non slip socks and assistive devices out of sight.     Problem: Communication  Goal: The ability to communicate needs accurately and effectively will improve  Outcome: PROGRESSING AS EXPECTED  Pt encouraged to voice feelings/concerns. Pt verbalizes pain level to nursing staff.     Problem: Venous Thromboembolism (VTW)/Deep Vein Thrombosis (DVT) Prevention:  Goal: Patient will participate in Venous Thrombosis (VTE)/Deep Vein Thrombosis (DVT)Prevention Measures  Outcome: PROGRESSING AS EXPECTED     SCD's in use. Pt ambulates frequently

## 2020-09-12 NOTE — OP REPORT
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    POSTOPERATIVE DIAGNOSIS:  T11-12 thoracic disk, the level was indicated   incorrectly on the radiologic report and it was confirmed using ultrasound   intraoperatively with thoracic myelopathy.    PROCEDURES:  1.  Placement of posterolateral instrumentation at T10, T11, T12, L1 on the   right side and T10 and L1 on the left side.  2.  Use of Stealth sterotactic guidance for placement of instrumentation and   guidance for transpedicular approach.  3.  Use of intraoperative ultrasound for confirmation of thoracic disk and   decompression.  4.  Marshallberg of autograft from the lamina of T11, T12 and L1.  5.  Transpedicular approach at T11, T12 on the left side for removal of T11-12   thoracic disk herniation.  6.  Posterior laminectomy complete at T11, T12 for decompression of spinal   cord.  7.  Posterolateral arthrodesis from T10, T11, T12, L1 using allograft,   autograft and DBM putty.  8.  Use of intraoperative neuromonitoring for post flip motor, sensories plus   decompression.  They were stable throughout the entirety of the case for both   SSEPs and MEPs.    COMPLICATIONS:  None.    SPECIMEN:  None.    IMPLANTS:  Medtronic Solera system with 5.5x40 mm screws placed at T10, 6.5x40   mm screw placed in the right side at T11, 6.5x45 mm screw at T12 on the   right, skipping pedicles on the left at T11, T12 and then 6.5x40 mm screw at   L1 placed in a cortical screw trajectory.  Under fluoro visualization, we used   a crosslink and a titanium carrie 110 mm in length and total of 6 screw caps.    Bone graft was autograft removed from the lamina as well as two 1x10 cm   MagniFuse bags as well as a 5 mL container of DBM putty placed across the left   and right posterolateral arthrodesis as well as down over the lamina of   T12-L1.    DRAIN:  A  medium Hemovac.    COMPLICATIONS:  None.    BLOOD LOSS:  250 mL.    BRIEF HISTORY OF PRESENTING ILLNESS:  This is a gentleman born in 1940, who is   going on a hike back in February who at the end of the hike noticed his legs   were numb.  He subsequently had an evaluation as an outpatient workup and   subsequently had an MRI of the brain, thoracic, cervical and then eventually   lumbar spine.  As part of the evaluation, the patient eventually made his way   to our office.  The MRIs were from March showing a significant thoracic disk   with facet hypertrophy at the same level.  The original read was that this was   at the T10-11 level; however, it was found intraoperatively to be at the   T11-12 level.  It Required us to do a transpedicular approach at both T11 and   12 regardless of the level count and this was confirmed intraoperatively using   ultrasound prior to doing the transpedicular approaches.    The patient had had a progressive ataxia and once we had evaluated him, he is   saying that he continued to have progressive ataxia, so he was urgently booked   for the OR for thoracic decompression and fusion.    Consent was obtained from the patient apprising him of the risks,   complications, indications of the surgery, which included, but were not   limited to nonunion, paralysis and need for more surgery, agreed and   consented.    PROCEDURE IN DETAIL:  The patient was brought to the operating room where he   was placed under general anesthesia with endotracheal intubation.  He was   wired for neuromonitoring.  He was then flipped onto a chest pad, thigh and   hip pads on a Patricio table.  We then clipped, prepped, and draped the patient   in sterile usual fashion for a lower thoracic surgery and then obtained an AP   fluoro shot demarcating the inferior rib as our inferior aspect of our   incision.  We then performed a surgical timeout confirming the correct side,   site, procedure and patient with all  faculty and staff agreeing, infiltrated   the patient's dermis with Marcaine with epinephrine and then incised using a   10 blade surgical scalpel in the midline approximately 3 spine levels.  We   then in avascular plane dissected down the spinous process out over the pars   at T10, T11, T12 as well as spinous process at L1 and then placed a spinous   process clamp on L1, obtained a lateral fluoroscopy shot to confirm our levels   and then after we felt that we had adequate exposure for placement of   hardware at T10, 11, 12 with a possibility of placing at L1 as well.  We then   obtained a CT scan with O-arm.  Then using Stealth navigation, we then placed    holes at T10, T11, T12 and then left out the 12-1 facet joint for the   moment to ensure that we had adequate one that we had correct location as well   as to ensure that the disk was demarcated at the correct level as it was   placed in the final read as a T10-11 disk.  If that was the case, there was no   12th rib.  Because of the count, we wanted to perform a laminectomy prior to   doing our transpedicular approach to confirm the level of the disk, so we   placed pedicle probes at T10, T11, T12 and then proceeded with.  After that   was completed, a laminectomy at T11 with inferior aspect of T10 removed and   then checked the disk interspace.  At this point, there was appearance of a   broad-based bulge, but there was nothing significant as was found in the   patient's MRI imaging.  We therefore removed the superior half of the T12   vertebra and then checked again with the intraoperative ultrasound and   confirmed that the disk was actually at the T11-12 level by rib count.  Given   that finding, we then planned to place screws at L1 by first making a    hole using the navigated drill at L1 and then placed a cortical screw   trajectory screw using the awl tip tap.  After we had all 4 pedicles tapped   from T10, T11, T12 and L1, we placed our  screws and cortical screw trajectory   at the L1 level, completed the laminectomy at T12 and then placed screws at   T10 as well as in the pedicles on the right side at T11 and T12.  Once this   was completed and we had a laminectomy that was eccentric to the patient's   left side, we then did a transpedicular approach, drilling down the pedicles   at T11 and T12 on the left and then infracturing the pedicle on the left into   the pedicle.  This way, we could undermine and feel underneath the cord.    While doing all of this, we were monitoring for both motors and sensories, all   of which remained stable throughout the entirety of this and once we had a   laminectomy and our transpedicular approach completed, we ran a pre-reduction   motor as well as sensory and then we used a down pushing curette to reach   underneath the thecal sac and removed the 11-12 disk herniation by scraping   out, it was very ossified.  We did this using ultrasound to check our   progress.  Once we felt that we had completely removed the disk and it was   flattened, then we could see that there was fluid signal in front of the cord   as well as epidural fluid signal in front of the cord.  We had completed our   diskectomy.  At that point, we flosealed the pedicles, checked for adequate   hemostasis, placed our rods into our screw tulips and then decorticated across   the lamina of T10.  On the right side, we still had our pars and portion of   the lamina.  This was decorticated and then once we had rods and tulips and   screw caps in place, we final torqued this.  We then obtained an O-arm spin to   confirm that we had appropriate placement of hardware, which there was.  We   placed a crosslink and then we placed our bone bags along with autograft into   the pars lamina construct on the right and then across the hardware on the   left and then placed our 5 mL of DBM putty at the inferior aspect of our   construct.  Once this was completed,  we then checked for adequate hemostasis,   tunneled a medium Hemovac out superiorly and then ran a final motor and   sensory, which was stable and then we closed the wound in layers using 0   Vicryls on the fascia, 2-0 Vicryls on the dermis and then surgical staples   along with a silver dressing as a dressing.  All counts were correct x2.    Neuromonitoring was stable throughout the entirety of the case.  My physician   assistant was necessary; she assisted with exposure, screw placement and   keeping the field dry, which expedited the case.       ____________________________________     MD VALERIE Vasques / FELECIA    DD:  09/11/2020 17:01:24  DT:  09/11/2020 20:32:29    D#:  [id number]  Job#:  [id number]

## 2020-09-12 NOTE — PROGRESS NOTES
Two RN skin check complete with Toky RN  Devices in place SCD's, jadon betsykareem  Skin Assessed under devices: Yes    Pt has surgical incision to back, silver mepilex in place, CDI. Elbows red and blanching. Pt given extra pillows and blankets for comfort and positioning.

## 2020-09-13 NOTE — CARE PLAN
Problem: Safety  Goal: Will remain free from falls  Outcome: PROGRESSING AS EXPECTED     Problem: Knowledge Deficit  Goal: Knowledge of disease process/condition, treatment plan, diagnostic tests, and medications will improve  Outcome: PROGRESSING AS EXPECTED     Problem: Discharge Barriers/Planning  Goal: Patient's continuum of care needs will be met  Outcome: PROGRESSING AS EXPECTED     Problem: Pain Management  Goal: Pain level will decrease to patient's comfort goal  Outcome: PROGRESSING AS EXPECTED

## 2020-09-13 NOTE — PROGRESS NOTES
Notified CM regarding home health being set up- will call this RN back when back in office. HVAC removed per order.

## 2020-09-13 NOTE — PROGRESS NOTES
Per CM- home health will probably not be arranged today as it is a weekend day. Notified Trey REGALADO- okay for pt to d/c home with wife without home health being arranged if pt feels comfortable. Discussed with pt-still wants to go home today.

## 2020-09-13 NOTE — PROGRESS NOTES
Went over discharge instructions w/ pt, when to call the doctor, f/u appointments, medications, spinal precautions. Prescriptions and copy of discharge paperwork given to pt. Pt had no further questions, pt discharged to home w/ family by WC.

## 2020-09-13 NOTE — DISCHARGE INSTRUCTIONS
Leave incision open to air.   OK to shower & pat dry  No soaking in hot tubs, baths, pools, etc.  Wear your brace when you are out of bed, you may remove it for sleeping, showering, eating  Avoid repetitive bending, lifting over 10lbs, twisting. We encourage you to take frequent walks as tolerated  Do not drive or consume alcohol while taking narcotic pain medications. Do not take additional acetaminophen (tylenol) without consulting our office.   Do not take NSAIDs (such as ibuprofen or naproxen) or Aspirin unless you have been told otherwise by Dr Armenta's team  Follow up at [facility] office in 2 weeks. Call with questions or concerns @ [phone]    Discharge Instructions    Discharged to home by car with relative. Discharged via wheelchair, hospital escort: Yes.  Special equipment needed: TLSO and Walker    Be sure to schedule a follow-up appointment with your primary care doctor or any specialists as instructed.     Discharge Plan:        I understand that a diet low in cholesterol, fat, and sodium is recommended for good health. Unless I have been given specific instructions below for another diet, I accept this instruction as my diet prescription.   Other diet: N/A    Special Instructions: None    · Is patient discharged on Warfarin / Coumadin?   No     Depression / Suicide Risk    As you are discharged from this [facility] facility, it is important to learn how to keep safe from harming yourself.    Recognize the warning signs:  · Abrupt changes in personality, positive or negative- including increase in energy   · Giving away possessions  · Change in eating patterns- significant weight changes-  positive or negative  · Change in sleeping patterns- unable to sleep or sleeping all the time   · Unwillingness or inability to communicate  · Depression  · Unusual sadness, discouragement and loneliness  · Talk of wanting to die  · Neglect of personal appearance   · Rebelliousness- reckless  behavior  · Withdrawal from people/activities they love  · Confusion- inability to concentrate     If you or a loved one observes any of these behaviors or has concerns about self-harm, here's what you can do:  · Talk about it- your feelings and reasons for harming yourself  · Remove any means that you might use to hurt yourself (examples: pills, rope, extension cords, firearm)  · Get professional help from the community (Mental Health, Substance Abuse, psychological counseling)  · Do not be alone:Call your Safe Contact- someone whom you trust who will be there for you.  · Call your local CRISIS HOTLINE 795-0410 or 063-824-4755  · Call your local Children's Mobile Crisis Response Team [place] (856) 099-0310 or www.Harry's  · Call the toll free National Suicide Prevention Hotlines   · National Suicide Prevention Lifeline 761-744-JJSR (4504)  · National Hope Line Network 800-SUICIDE (765-1616)      Spinal Fusion, Adult, Care After  This sheet gives you information about how to care for yourself after your procedure. Your health care provider may also give you more specific instructions. If you have problems or questions, contact your health care provider.  What can I expect after the procedure?  After the procedure, it is common to have:  · Back pain and stiffness.  · Pain in the incision area.  Follow these instructions at home:  Medicines  · Take over-the-counter and prescription medicines only as told by your health care provider. These include any pain medicines or blood thinning medicines (anticoagulants).  · If you were prescribed an antibiotic medicine, take it as told by your health care provider. Do not stop taking the antibiotic even if you start to feel better.  · Do not drive for 24 hours if you received a medicine to help you relax (sedative).  · Do not drive or use heavy machinery while taking prescription pain medicine.  If you have a brace:  · Wear the brace as told by your health care  provider. Remove it only as told by your health care provider.  · Keep the brace clean.  Managing pain, stiffness, and swelling  · If directed, put ice on the injured area:  ? If you have a removable brace, remove it as told by your health care provider.  ? Put ice in a plastic bag.  ? Place a towel between your skin and the bag.  ? Leave the ice on for 20 minutes, 2-3 times a day.  Incision care    · Follow instructions from your health care provider about how to take care of your incision. Make sure you:  ? Wash your hands with soap and water before you change your bandage (dressing). If soap and water are not available, use hand .  ? Change your dressing as told by your health care provider.  ? Leave stitches (sutures), skin glue, or adhesive strips in place. These skin closures may need to be in place for 2 weeks or longer. If adhesive strip edges start to loosen and curl up, you may trim the loose edges. Do not remove adhesive strips completely unless your health care provider tells you to do that.  · Keep your incision clean and dry. Do not take baths, swim, or use a hot tub until your health care provider approves.  · Check your incision area every day for signs of infection. Check for:  ? More redness, swelling, or pain.  ? Fluid or blood.  ? Warmth.  ? Pus or a bad smell.  Physical activity  · Rest and protect your back as much as possible.  · Follow instructions from your health care provider about how to move and use good posture to help your spine heal.  · Do not lift anything that is heavier than 8 lb (3.6 kg) or as told by your health care provider.  · Do not twist or bend at the waist until your health care provider approves.  · Avoid:  ? Pushing and pulling motions.  ? Lifting anything over your head.  ? Sitting or lying down in the same position for long periods of time.  · Do not exercise until your health care provider approves. Once your health care provider has approved exercise, ask  him or her what kinds of exercises you can do to make your back stronger (physical therapy).  General instructions  · Wear compression stockings and walk at least every few hours as told by your health care provider. Doing this will help to prevent blood clots and reduce swelling in your legs.  · Do not use any products that contain nicotine or tobacco, such as cigarettes and e-cigarettes. These can delay bone healing. If you need help quitting, ask your health care provider.  · To prevent or treat constipation while you are taking prescription pain medicine, your health care provider may recommend that you:  ? Drink enough fluid to keep your urine clear or pale yellow.  ? Take over-the-counter or prescription medicines.  ? Eat foods that are high in fiber, such as fresh fruits and vegetables, whole grains, and beans.  ? Limit foods that are high in fat and processed sugars, such as fried and sweet foods.  · Keep all follow-up visits as told by your health care provider. This is important.  Contact a health care provider if:  · You have pain that gets worse or does not get better with medicine.  · Your legs or feet become painful or swollen.  · You have more redness, swelling, or pain around your incision.  · You have fluid or blood coming from your incision.  · Your incision feels warm to the touch.  · You have pus or a bad smell coming from your incision.  · You have a fever.  · You vomit or feel nausea.  · You have weakness or numbness in your legs that is new or getting worse.  · You have trouble controlling urination or bowel movements.  Get help right away if:  · You have severe pain.  · You have chest pain.  · You have trouble breathing.  · You develop a cough.  These symptoms may represent a serious problem that is an emergency. Do not wait to see if the symptoms will go away. Get medical help right away. Call your local emergency services (911 in the [place] Do not drive yourself to the  \[facility]\"".  Summary  · It is common to have pain at the back and incision area.  · Icing and pain medicines may help to control the pain. Follow directions from your health care provider.  · Rest and protect your back as much as possible. Do not twist or bend at the waist. Get up and walk at least every few hours as told by your health care provider.  This information is not intended to replace advice given to you by your health care provider. Make sure you discuss any questions you have with your health care provider.  Document Released: 07/07/2006 Document Revised: 09/07/2019 Document Reviewed: 12/06/2017  Elsevier Patient Education © 2020 [employer]

## 2020-09-13 NOTE — PROGRESS NOTES
Neurosurgery Progress Note    Subjective:  States numbness improved  Eating, some ambulation, voiding.  Pain controlled on orals  Hemovac drain decreasing  Worked with PT/OT  -did stairs    Exam:  BLE str intact-   Wound: C/D/I  - hvac 25cc/8hrs  Sensation intact    BP  Min: 128/83  Max: 156/93  Pulse  Av.5  Min: 70  Max: 92  Resp  Av.7  Min: 16  Max: 17  Temp  Av.7 °C (98 °F)  Min: 36.2 °C (97.1 °F)  Max: 37.5 °C (99.5 °F)  SpO2  Av.8 %  Min: 91 %  Max: 96 %    No data recorded                      Intake/Output       20 - 20 - 2059      [id number] 9490-6479 Total 5822-9015 8809-0551 Total       Intake    P.O.  --  -- --  400  -- 400    P.O. -- -- -- 400 -- 400    Total Intake -- -- -- 400 -- 400       Output    Urine  100  800 900  --  -- --    Number of Times Voided 1 x 1 x 2 x -- -- --    Urine Void (mL) 100 800 900 -- -- --    Drains  135  110 245  --  -- --    Output (mL) (Closed/Suction Drain 1 Back Hemovac 10 Fr.) 135 110 245 -- -- --    Total Output  -- -- --       Net I/O     -235 -910 -1145 400 -- 400            Intake/Output Summary (Last 24 hours) at 2020 0900  Last data filed at 2020 0800  Gross per 24 hour   Intake 400 ml   Output 1145 ml   Net -745 ml            • ALPRAZolam  0.25 mg HS PRN   • omeprazole  20 mg DAILY   • Pharmacy Consult Request  1 Each PHARMACY TO DOSE   • MD ALERT...DO NOT ADMINISTER NSAIDS or ASPIRIN unless ORDERED By Neurosurgery  1 Each PRN   • docusate sodium  100 mg BID   • senna-docusate  1 Tab Nightly   • senna-docusate  1 Tab Q24HRS PRN   • polyethylene glycol/lytes  1 Packet BID PRN   • magnesium hydroxide  30 mL QDAY PRN   • bisacodyl  10 mg Q24HRS PRN   • fleet  1 Each Once PRN   • dextrose 5 % and 0.9 % NaCl with KCl 20 mEq   Continuous   • acetaminophen  1,000 mg Q6HRS   • oxyCODONE immediate-release  5 mg Q3HRS PRN   • oxyCODONE immediate release  10 mg Q3HRS PRN   • HYDROmorphone   0.5 mg Q3HRS PRN   • diphenhydrAMINE  25 mg Q6HRS PRN    Or   • diphenhydrAMINE  25 mg Q6HRS PRN   • ondansetron  4 mg Q4HRS PRN   • ondansetron  4 mg Q4HRS PRN   • methocarbamol  750 mg Q8HRS PRN    Or   • cyclobenzaprine  10 mg Q8HRS PRN    Or   • diazePAM  5 mg Q4HRS PRN   • labetalol  10 mg Q HOUR PRN   • benzocaine-menthol  1 Lozenge Q2HRS PRN   • calcium carbonate  500 mg BID   • lisinopril  10 mg Q DAY    And   • hydroCHLOROthiazide  12.5 mg Q DAY       Assessment and Plan:  POD #2 LT10-L1 fusion  Prophylactic anticoagulation: no         Start date/time: tbd    Plan:  D/C hemovac   PTOT- continue mobilization  HH ordered  Continue pain control  D/C Home today at 1500 hrs as long as HH is arranged

## 2020-09-13 NOTE — DISCHARGE PLANNING
Thank you for sending this referral to [facility]. We are unable to verify patients pcp until Monday. Referral placed on hold.    Thanks [facility]

## 2020-09-13 NOTE — DISCHARGE PLANNING
Received Choice form at [id number]  Agency/Facility Name: [facility]  Referral sent per Choice form @ [phone]

## 2020-09-13 NOTE — DISCHARGE PLANNING
Anticipated Discharge Disposition: Home with Home Health    Action: Spoke with the patient at the bedside about HH choices. The patient picked [facility] as his #1 choice and [facility] as #2 choice.  Choice form faxed to Gia HUNT at [phone].    Barriers to Discharge: HH acceptance    Plan: Will continue to follow for any discharge needs.

## 2020-09-13 NOTE — PROGRESS NOTES
Pt aaox4. CAUSEY 5/5. Denies N/T. Up w/ SBA, steady gait with FWW. TLSO on when OOB >5 minutes. Pt c/o back pain, controlled with PO pain meds. +BS, good appetite. Voiding w/o difficulty. Dressing CDI. HVAC in place, compressed. Reviewed poc with pt-verbalized understanding. Call light in reach.

## 2020-09-14 NOTE — DISCHARGE PLANNING
ATTN: Case Management  RE: Referral for Home Health    As of 9/14/2020, we have accepted the Home Health referral for the patient listed above.    A [facility] clinician will be out to see the patient within 48 hours. If you have any questions or concerns regarding the patient’s transition to Home Health, please do not hesitate to contact us at [phone].      We look forward to collaborating with you,  [facility] Home Health Team

## 2020-09-15 NOTE — PROGRESS NOTES
Received referral from [facility]. Medications reviewed. No clinically significant interactions noted.     Krishan Gibson, PharmD, MS, BCACP, [facility]  Phone [phone] fax [phone]    This note was created using voice recognition software (Dragon). The accuracy of the dictation is limited by the abilities of the software. I have reviewed the note prior to signing, however some errors in grammar and context are still possible. If you have any questions related to this note please do not hesitate to contact our office.

## 2020-09-15 NOTE — PROGRESS NOTES
TC to Dr. Armenta's office, left message for Nurse Yaakov regarding clarification of dressing/incisional care as DC inst. given to pt  stated to leave [facility]OH[facility] but inc. still has Mepilex AG dressing and did not leave inst. for nurse to remove. Nurse's call back number provided.

## 2020-09-16 NOTE — PROGRESS NOTES
Spoke with MA at Dr Armenta's office regarding dressing intact on pt back but written orders on dc summary to leave incision open to air. Per FABRICIO DOYLE at Surgeons office wants wound dressing removed, leave YOHAN, have pt shower and wash incision with soap and water. Called pt and discussed this with him, he states he will have wife remove the dressing and then shower, leave YOHAN. Instructed him to not use any creams, lotions, etc on the wound  and to call HH for any redness, drainage, edema, etc. Pt verbalized understanding

## 2020-09-23 NOTE — PROGRESS NOTES
Please give NOMNC & do Richards next week. He has 1 more SNV next week but might opt to cancel it if he is doing well. 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

## 2021-01-01 ENCOUNTER — HOSPITAL ENCOUNTER (OUTPATIENT)
Dept: LAB | Facility: MEDICAL CENTER | Age: 81
DRG: 545 | End: 2021-04-12
Attending: INTERNAL MEDICINE
Payer: MEDICARE

## 2021-01-01 ENCOUNTER — APPOINTMENT (OUTPATIENT)
Dept: RADIOLOGY | Facility: MEDICAL CENTER | Age: 81
DRG: 545 | End: 2021-01-01
Attending: PHYSICIAN ASSISTANT
Payer: MEDICARE

## 2021-01-01 ENCOUNTER — ANESTHESIA EVENT (OUTPATIENT)
Dept: SURGERY | Facility: MEDICAL CENTER | Age: 81
DRG: 545 | End: 2021-01-01
Payer: MEDICARE

## 2021-01-01 ENCOUNTER — PRE-ADMISSION TESTING (OUTPATIENT)
Dept: ADMISSIONS | Facility: MEDICAL CENTER | Age: 81
DRG: 545 | End: 2021-01-01
Attending: INTERNAL MEDICINE
Payer: MEDICARE

## 2021-01-01 ENCOUNTER — APPOINTMENT (OUTPATIENT)
Dept: RADIOLOGY | Facility: MEDICAL CENTER | Age: 81
DRG: 545 | End: 2021-01-01
Attending: INTERNAL MEDICINE
Payer: MEDICARE

## 2021-01-01 ENCOUNTER — ANESTHESIA (OUTPATIENT)
Dept: SURGERY | Facility: MEDICAL CENTER | Age: 81
DRG: 545 | End: 2021-01-01
Payer: MEDICARE

## 2021-01-01 ENCOUNTER — HOSPITAL ENCOUNTER (OUTPATIENT)
Dept: CARDIOLOGY | Facility: MEDICAL CENTER | Age: 81
DRG: 545 | End: 2021-04-13
Attending: INTERNAL MEDICINE
Payer: MEDICARE

## 2021-01-01 ENCOUNTER — HOSPITAL ENCOUNTER (INPATIENT)
Facility: MEDICAL CENTER | Age: 81
LOS: 6 days | DRG: 545 | End: 2021-04-19
Attending: EMERGENCY MEDICINE | Admitting: STUDENT IN AN ORGANIZED HEALTH CARE EDUCATION/TRAINING PROGRAM
Payer: MEDICARE

## 2021-01-01 ENCOUNTER — IMMUNIZATION (OUTPATIENT)
Dept: FAMILY PLANNING/WOMEN'S HEALTH CLINIC | Facility: IMMUNIZATION CENTER | Age: 81
End: 2021-01-01
Attending: INTERNAL MEDICINE
Payer: MEDICARE

## 2021-01-01 ENCOUNTER — HOSPITAL ENCOUNTER (OUTPATIENT)
Dept: RADIOLOGY | Facility: MEDICAL CENTER | Age: 81
End: 2021-03-05
Attending: FAMILY MEDICINE
Payer: MEDICARE

## 2021-01-01 ENCOUNTER — HOSPITAL ENCOUNTER (OUTPATIENT)
Dept: LAB | Facility: MEDICAL CENTER | Age: 81
End: 2021-03-18
Attending: INTERNAL MEDICINE
Payer: MEDICARE

## 2021-01-01 ENCOUNTER — HOSPITAL ENCOUNTER (OUTPATIENT)
Dept: LAB | Facility: MEDICAL CENTER | Age: 81
End: 2021-04-03
Attending: INTERNAL MEDICINE
Payer: MEDICARE

## 2021-01-01 ENCOUNTER — HOSPITAL ENCOUNTER (OUTPATIENT)
Dept: RADIOLOGY | Facility: MEDICAL CENTER | Age: 81
DRG: 545 | End: 2021-04-12
Attending: INTERNAL MEDICINE
Payer: MEDICARE

## 2021-01-01 ENCOUNTER — HOSPITAL ENCOUNTER (OUTPATIENT)
Dept: LAB | Facility: MEDICAL CENTER | Age: 81
End: 2021-04-06
Attending: INTERNAL MEDICINE
Payer: MEDICARE

## 2021-01-01 ENCOUNTER — HOSPITAL ENCOUNTER (OUTPATIENT)
Dept: RADIOLOGY | Facility: MEDICAL CENTER | Age: 81
End: 2021-02-23
Attending: FAMILY MEDICINE
Payer: MEDICARE

## 2021-01-01 ENCOUNTER — HOSPITAL ENCOUNTER (OUTPATIENT)
Facility: MEDICAL CENTER | Age: 81
DRG: 545 | End: 2021-01-01
Attending: INTERNAL MEDICINE | Admitting: INTERNAL MEDICINE
Payer: MEDICARE

## 2021-01-01 ENCOUNTER — HOSPITAL ENCOUNTER (OUTPATIENT)
Dept: LAB | Facility: MEDICAL CENTER | Age: 81
End: 2021-03-23
Attending: PHYSICIAN ASSISTANT
Payer: MEDICARE

## 2021-01-01 ENCOUNTER — HOSPITAL ENCOUNTER (OUTPATIENT)
Dept: RADIOLOGY | Facility: MEDICAL CENTER | Age: 81
End: 2021-04-01
Attending: PHYSICIAN ASSISTANT
Payer: MEDICARE

## 2021-01-01 ENCOUNTER — HOSPITAL ENCOUNTER (OUTPATIENT)
Dept: LAB | Facility: MEDICAL CENTER | Age: 81
End: 2021-02-22
Attending: FAMILY MEDICINE
Payer: MEDICARE

## 2021-01-01 ENCOUNTER — HOSPITAL ENCOUNTER (OUTPATIENT)
Dept: RADIOLOGY | Facility: MEDICAL CENTER | Age: 81
End: 2021-03-16
Attending: FAMILY MEDICINE
Payer: MEDICARE

## 2021-01-01 ENCOUNTER — APPOINTMENT (OUTPATIENT)
Dept: RADIOLOGY | Facility: MEDICAL CENTER | Age: 81
DRG: 545 | End: 2021-01-01
Attending: EMERGENCY MEDICINE
Payer: MEDICARE

## 2021-01-01 ENCOUNTER — HOSPITAL ENCOUNTER (OUTPATIENT)
Dept: LAB | Facility: MEDICAL CENTER | Age: 81
End: 2021-03-11
Attending: FAMILY MEDICINE
Payer: MEDICARE

## 2021-01-01 ENCOUNTER — HOSPITAL ENCOUNTER (OUTPATIENT)
Dept: RADIOLOGY | Facility: MEDICAL CENTER | Age: 81
End: 2021-01-01
Attending: INTERNAL MEDICINE
Payer: MEDICARE

## 2021-01-01 VITALS
SYSTOLIC BLOOD PRESSURE: 92 MMHG | WEIGHT: 182.98 LBS | RESPIRATION RATE: 18 BRPM | HEIGHT: 72 IN | BODY MASS INDEX: 24.78 KG/M2 | HEART RATE: 63 BPM | OXYGEN SATURATION: 94 % | DIASTOLIC BLOOD PRESSURE: 56 MMHG | TEMPERATURE: 96.5 F

## 2021-01-01 DIAGNOSIS — R74.8 HYPERAMYLASEMIA: ICD-10-CM

## 2021-01-01 DIAGNOSIS — Z23 ENCOUNTER FOR VACCINATION: Primary | ICD-10-CM

## 2021-01-01 DIAGNOSIS — R74.8 ELEVATED LIVER ENZYMES: ICD-10-CM

## 2021-01-01 DIAGNOSIS — K57.30 DIVERTICULOSIS OF LARGE INTESTINE WITHOUT DIVERTICULITIS: ICD-10-CM

## 2021-01-01 DIAGNOSIS — Z01.812 PRE-OPERATIVE LABORATORY EXAMINATION: ICD-10-CM

## 2021-01-01 DIAGNOSIS — R18.8 OTHER ASCITES: ICD-10-CM

## 2021-01-01 DIAGNOSIS — R79.89 LFTS ABNORMAL: ICD-10-CM

## 2021-01-01 DIAGNOSIS — R17 JAUNDICE: ICD-10-CM

## 2021-01-01 DIAGNOSIS — R94.5 NONSPECIFIC ABNORMAL RESULTS OF LIVER FUNCTION STUDY: ICD-10-CM

## 2021-01-01 DIAGNOSIS — R74.8 ACID PHOSPHATASE ELEVATED: ICD-10-CM

## 2021-01-01 DIAGNOSIS — Z23 NEED FOR VACCINATION: ICD-10-CM

## 2021-01-01 LAB
25(OH)D3 SERPL-MCNC: 23 NG/ML (ref 30–80)
A1AT SERPL-MCNC: 210 MG/DL (ref 90–200)
AFP-TM SERPL-MCNC: 3 NG/ML (ref 0–9)
ALBUMIN FLD-MCNC: 0.3 G/DL
ALBUMIN SERPL BCP-MCNC: 2.5 G/DL (ref 3.2–4.9)
ALBUMIN SERPL BCP-MCNC: 2.5 G/DL (ref 3.2–4.9)
ALBUMIN SERPL BCP-MCNC: 2.7 G/DL (ref 3.2–4.9)
ALBUMIN SERPL BCP-MCNC: 2.8 G/DL (ref 3.2–4.9)
ALBUMIN SERPL BCP-MCNC: 2.8 G/DL (ref 3.2–4.9)
ALBUMIN SERPL BCP-MCNC: 3 G/DL (ref 3.2–4.9)
ALBUMIN SERPL BCP-MCNC: 3.1 G/DL (ref 3.2–4.9)
ALBUMIN SERPL BCP-MCNC: 3.1 G/DL (ref 3.2–4.9)
ALBUMIN SERPL BCP-MCNC: 3.2 G/DL (ref 3.2–4.9)
ALBUMIN SERPL BCP-MCNC: 3.2 G/DL (ref 3.2–4.9)
ALBUMIN SERPL BCP-MCNC: 3.3 G/DL (ref 3.2–4.9)
ALBUMIN SERPL ELPH-MCNC: 2.37 G/DL (ref 3.75–5.01)
ALBUMIN/GLOB SERPL: 1.3 G/DL
ALBUMIN/GLOB SERPL: 1.3 G/DL
ALBUMIN/GLOB SERPL: 1.4 G/DL
ALBUMIN/GLOB SERPL: 1.5 G/DL
ALBUMIN/GLOB SERPL: 1.6 G/DL
ALBUMIN/GLOB SERPL: 1.7 G/DL
ALBUMIN/GLOB SERPL: 1.8 G/DL
ALP BONE SERPL-CCNC: 119 U/L (ref 0–55)
ALP BONE SERPL-CCNC: 124 U/L (ref 0–55)
ALP ISOS SERPL HS-CCNC: 0 U/L
ALP ISOS SERPL HS-CCNC: 0 U/L
ALP LIVER SERPL-CCNC: 289 U/L (ref 0–94)
ALP LIVER SERPL-CCNC: 450 U/L (ref 0–94)
ALP SERPL-CCNC: 328 U/L (ref 30–99)
ALP SERPL-CCNC: 352 U/L (ref 30–99)
ALP SERPL-CCNC: 353 U/L (ref 30–99)
ALP SERPL-CCNC: 354 U/L (ref 30–99)
ALP SERPL-CCNC: 413 U/L (ref 40–120)
ALP SERPL-CCNC: 418 U/L (ref 30–99)
ALP SERPL-CCNC: 418 U/L (ref 30–99)
ALP SERPL-CCNC: 434 U/L (ref 30–99)
ALP SERPL-CCNC: 464 U/L (ref 30–99)
ALP SERPL-CCNC: 527 U/L (ref 30–99)
ALP SERPL-CCNC: 551 U/L (ref 30–99)
ALP SERPL-CCNC: 559 U/L (ref 30–99)
ALP SERPL-CCNC: 569 U/L (ref 40–120)
ALPHA1 GLOB SERPL ELPH-MCNC: 0.23 G/DL (ref 0.19–0.46)
ALPHA2 GLOB SERPL ELPH-MCNC: 0.66 G/DL (ref 0.48–1.05)
ALT SERPL-CCNC: 169 U/L (ref 2–50)
ALT SERPL-CCNC: 180 U/L (ref 2–50)
ALT SERPL-CCNC: 211 U/L (ref 2–50)
ALT SERPL-CCNC: 51 U/L (ref 2–50)
ALT SERPL-CCNC: 58 U/L (ref 2–50)
ALT SERPL-CCNC: 59 U/L (ref 2–50)
ALT SERPL-CCNC: 60 U/L (ref 2–50)
ALT SERPL-CCNC: 83 U/L (ref 2–50)
ALT SERPL-CCNC: 95 U/L (ref 2–50)
AMMONIA PLAS-SCNC: 21 UMOL/L (ref 11–45)
AMMONIA PLAS-SCNC: 48 UMOL/L (ref 11–45)
AMYLASE FLD-CCNC: 29 U/L
AMYLASE SERPL-CCNC: 58 U/L (ref 20–103)
ANION GAP SERPL CALC-SCNC: 11 MMOL/L (ref 7–16)
ANION GAP SERPL CALC-SCNC: 12 MMOL/L (ref 7–16)
ANION GAP SERPL CALC-SCNC: 12 MMOL/L (ref 7–16)
ANION GAP SERPL CALC-SCNC: 15 MMOL/L (ref 7–16)
ANION GAP SERPL CALC-SCNC: 18 MMOL/L (ref 7–16)
ANION GAP SERPL CALC-SCNC: 19 MMOL/L (ref 7–16)
ANION GAP SERPL CALC-SCNC: 20 MMOL/L (ref 7–16)
ANION GAP SERPL CALC-SCNC: 21 MMOL/L (ref 7–16)
ANION GAP SERPL CALC-SCNC: 6 MMOL/L (ref 7–16)
ANION GAP SERPL CALC-SCNC: 9 MMOL/L (ref 7–16)
ANISOCYTOSIS BLD QL SMEAR: ABNORMAL
ANNOTATION COMMENT IMP: ABNORMAL
APPEARANCE FLD: CLEAR
APPEARANCE UR: CLEAR
APPEARANCE UR: CLEAR
APTT PPP: 33 SEC (ref 24.7–36)
AST SERPL-CCNC: 106 U/L (ref 12–45)
AST SERPL-CCNC: 110 U/L (ref 12–45)
AST SERPL-CCNC: 111 U/L (ref 12–45)
AST SERPL-CCNC: 116 U/L (ref 12–45)
AST SERPL-CCNC: 210 U/L (ref 12–45)
AST SERPL-CCNC: 284 U/L (ref 12–45)
AST SERPL-CCNC: 421 U/L (ref 12–45)
AST SERPL-CCNC: 427 U/L (ref 12–45)
AST SERPL-CCNC: 89 U/L (ref 12–45)
AST SERPL-CCNC: 90 U/L (ref 12–45)
AST SERPL-CCNC: 92 U/L (ref 12–45)
B-GLOBULIN SERPL ELPH-MCNC: 0.39 G/DL (ref 0.48–1.1)
BACTERIA #/AREA URNS HPF: NEGATIVE /HPF
BACTERIA #/AREA URNS HPF: NEGATIVE /HPF
BACTERIA BLD CULT: NORMAL
BACTERIA BLD CULT: NORMAL
BACTERIA FLD AEROBE CULT: NORMAL
BACTERIA FLD AEROBE CULT: NORMAL
BASOPHILS # BLD AUTO: 0 % (ref 0–1.8)
BASOPHILS # BLD AUTO: 0 % (ref 0–1.8)
BASOPHILS # BLD AUTO: 0.2 % (ref 0–1.8)
BASOPHILS # BLD AUTO: 0.3 % (ref 0–1.8)
BASOPHILS # BLD AUTO: 0.3 % (ref 0–1.8)
BASOPHILS # BLD AUTO: 0.4 % (ref 0–1.8)
BASOPHILS # BLD AUTO: 0.5 % (ref 0–1.8)
BASOPHILS # BLD AUTO: 0.6 % (ref 0–1.8)
BASOPHILS # BLD AUTO: 0.8 % (ref 0–1.8)
BASOPHILS # BLD: 0 K/UL (ref 0–0.12)
BASOPHILS # BLD: 0 K/UL (ref 0–0.12)
BASOPHILS # BLD: 0.02 K/UL (ref 0–0.12)
BASOPHILS # BLD: 0.03 K/UL (ref 0–0.12)
BASOPHILS # BLD: 0.03 K/UL (ref 0–0.12)
BASOPHILS # BLD: 0.04 K/UL (ref 0–0.12)
BASOPHILS # BLD: 0.04 K/UL (ref 0–0.12)
BASOPHILS # BLD: 0.05 K/UL (ref 0–0.12)
BASOPHILS # BLD: 0.05 K/UL (ref 0–0.12)
BILIRUB CONJ SERPL-MCNC: 4.1 MG/DL (ref 0.1–0.5)
BILIRUB CONJ SERPL-MCNC: 4.3 MG/DL (ref 0.1–0.5)
BILIRUB CONJ SERPL-MCNC: >10 MG/DL (ref 0.1–0.5)
BILIRUB INDIRECT SERPL-MCNC: 2.1 MG/DL (ref 0–1)
BILIRUB INDIRECT SERPL-MCNC: 2.7 MG/DL (ref 0–1)
BILIRUB INDIRECT SERPL-MCNC: NORMAL MG/DL (ref 0–1)
BILIRUB SERPL-MCNC: 10.9 MG/DL (ref 0.1–1.5)
BILIRUB SERPL-MCNC: 19.3 MG/DL (ref 0.1–1.5)
BILIRUB SERPL-MCNC: 20.7 MG/DL (ref 0.1–1.5)
BILIRUB SERPL-MCNC: 23.1 MG/DL (ref 0.1–1.5)
BILIRUB SERPL-MCNC: 24.8 MG/DL (ref 0.1–1.5)
BILIRUB SERPL-MCNC: 25.5 MG/DL (ref 0.1–1.5)
BILIRUB SERPL-MCNC: 3.2 MG/DL (ref 0.1–1.5)
BILIRUB SERPL-MCNC: 30.4 MG/DL (ref 0.1–1.5)
BILIRUB SERPL-MCNC: 4.8 MG/DL (ref 0.1–1.5)
BILIRUB SERPL-MCNC: 6.2 MG/DL (ref 0.1–1.5)
BILIRUB SERPL-MCNC: 7 MG/DL (ref 0.1–1.5)
BILIRUB UR QL STRIP.AUTO: ABNORMAL
BILIRUB UR QL STRIP.AUTO: ABNORMAL
BODY FLD TYPE: NORMAL
BODY FLD TYPE: NORMAL
BUN SERPL-MCNC: 21 MG/DL (ref 8–22)
BUN SERPL-MCNC: 24 MG/DL (ref 8–22)
BUN SERPL-MCNC: 25 MG/DL (ref 8–22)
BUN SERPL-MCNC: 27 MG/DL (ref 8–22)
BUN SERPL-MCNC: 41 MG/DL (ref 8–22)
BUN SERPL-MCNC: 51 MG/DL (ref 8–22)
BUN SERPL-MCNC: 52 MG/DL (ref 8–22)
BUN SERPL-MCNC: 72 MG/DL (ref 8–22)
BUN SERPL-MCNC: 74 MG/DL (ref 8–22)
BUN SERPL-MCNC: 95 MG/DL (ref 8–22)
BURR CELLS BLD QL SMEAR: NORMAL
BURR CELLS BLD QL SMEAR: NORMAL
CA-I SERPL-SCNC: 1.4 MMOL/L (ref 1.1–1.3)
CALCIUM SERPL-MCNC: 10.2 MG/DL (ref 8.5–10.5)
CALCIUM SERPL-MCNC: 10.5 MG/DL (ref 8.5–10.5)
CALCIUM SERPL-MCNC: 10.5 MG/DL (ref 8.5–10.5)
CALCIUM SERPL-MCNC: 10.6 MG/DL (ref 8.5–10.5)
CALCIUM SERPL-MCNC: 10.7 MG/DL (ref 8.5–10.5)
CALCIUM SERPL-MCNC: 10.7 MG/DL (ref 8.5–10.5)
CALCIUM SERPL-MCNC: 10.9 MG/DL (ref 8.5–10.5)
CALCIUM SERPL-MCNC: 11 MG/DL (ref 8.5–10.5)
CALCIUM SERPL-MCNC: 11.1 MG/DL (ref 8.5–10.5)
CALCIUM SERPL-MCNC: 11.1 MG/DL (ref 8.5–10.5)
CALCIUM SERPL-MCNC: 11.6 MG/DL (ref 8.5–10.5)
CALPROTECTIN STL-MCNT: 116 UG/G
CANCER AG19-9 SERPL-ACNC: 34.9 U/ML (ref 0–35)
CAOX CRY #/AREA URNS HPF: ABNORMAL /HPF
CHLORIDE SERPL-SCNC: 101 MMOL/L (ref 96–112)
CHLORIDE SERPL-SCNC: 103 MMOL/L (ref 96–112)
CHLORIDE SERPL-SCNC: 107 MMOL/L (ref 96–112)
CHLORIDE SERPL-SCNC: 92 MMOL/L (ref 96–112)
CHLORIDE SERPL-SCNC: 94 MMOL/L (ref 96–112)
CHLORIDE SERPL-SCNC: 96 MMOL/L (ref 96–112)
CHLORIDE SERPL-SCNC: 96 MMOL/L (ref 96–112)
CHLORIDE SERPL-SCNC: 97 MMOL/L (ref 96–112)
CHOLEST SERPL-MCNC: 165 MG/DL (ref 100–199)
CO2 SERPL-SCNC: 16 MMOL/L (ref 20–33)
CO2 SERPL-SCNC: 16 MMOL/L (ref 20–33)
CO2 SERPL-SCNC: 17 MMOL/L (ref 20–33)
CO2 SERPL-SCNC: 18 MMOL/L (ref 20–33)
CO2 SERPL-SCNC: 22 MMOL/L (ref 20–33)
CO2 SERPL-SCNC: 23 MMOL/L (ref 20–33)
CO2 SERPL-SCNC: 24 MMOL/L (ref 20–33)
CO2 SERPL-SCNC: 25 MMOL/L (ref 20–33)
CO2 SERPL-SCNC: 25 MMOL/L (ref 20–33)
CO2 SERPL-SCNC: 26 MMOL/L (ref 20–33)
COLOR FLD: YELLOW
COLOR UR: ABNORMAL
COLOR UR: ABNORMAL
COMMENT 1642: NORMAL
COMMENT 1642: NORMAL
COVID ORDER STATUS COVID19: NORMAL
CREAT SERPL-MCNC: 0.37 MG/DL (ref 0.5–1.4)
CREAT SERPL-MCNC: 0.4 MG/DL (ref 0.5–1.4)
CREAT SERPL-MCNC: 0.65 MG/DL (ref 0.5–1.4)
CREAT SERPL-MCNC: 0.67 MG/DL (ref 0.5–1.4)
CREAT SERPL-MCNC: 0.68 MG/DL (ref 0.5–1.4)
CREAT SERPL-MCNC: 0.72 MG/DL (ref 0.5–1.4)
CREAT SERPL-MCNC: 0.73 MG/DL (ref 0.5–1.4)
CREAT SERPL-MCNC: 0.78 MG/DL (ref 0.5–1.4)
CREAT SERPL-MCNC: 0.81 MG/DL (ref 0.5–1.4)
CREAT SERPL-MCNC: 0.86 MG/DL (ref 0.5–1.4)
CRP SERPL HS-MCNC: 0.74 MG/DL (ref 0–0.75)
CSF COMMENTS 1658: NORMAL
CYTOLOGY REG CYTOL: NORMAL
CYTOLOGY REG CYTOL: NORMAL
DAT C3D-SP REAG RBC QL: NORMAL
DAT C3D-SP REAG RBC QL: NORMAL
DAT IGG-SP REAG RBC QL: NORMAL
DAT IGG-SP REAG RBC QL: NORMAL
EER PROT ELECT SER Q1092: ABNORMAL
EKG IMPRESSION: NORMAL
EOSINOPHIL # BLD AUTO: 0 K/UL (ref 0–0.51)
EOSINOPHIL # BLD AUTO: 0.01 K/UL (ref 0–0.51)
EOSINOPHIL # BLD AUTO: 0.09 K/UL (ref 0–0.51)
EOSINOPHIL # BLD AUTO: 0.1 K/UL (ref 0–0.51)
EOSINOPHIL # BLD AUTO: 0.3 K/UL (ref 0–0.51)
EOSINOPHIL # BLD AUTO: 0.39 K/UL (ref 0–0.51)
EOSINOPHIL NFR BLD: 0 % (ref 0–6.9)
EOSINOPHIL NFR BLD: 0.1 % (ref 0–6.9)
EOSINOPHIL NFR BLD: 1.5 % (ref 0–6.9)
EOSINOPHIL NFR BLD: 1.5 % (ref 0–6.9)
EOSINOPHIL NFR BLD: 3.4 % (ref 0–6.9)
EOSINOPHIL NFR BLD: 4.8 % (ref 0–6.9)
EPI CELLS #/AREA URNS HPF: NEGATIVE /HPF
EPI CELLS #/AREA URNS HPF: NEGATIVE /HPF
EPO SERPL-ACNC: 50 MU/ML (ref 4–27)
ERYTHROCYTE [DISTWIDTH] IN BLOOD BY AUTOMATED COUNT: 63.6 FL (ref 35.9–50)
ERYTHROCYTE [DISTWIDTH] IN BLOOD BY AUTOMATED COUNT: 65.9 FL (ref 35.9–50)
ERYTHROCYTE [DISTWIDTH] IN BLOOD BY AUTOMATED COUNT: 66.3 FL (ref 35.9–50)
ERYTHROCYTE [DISTWIDTH] IN BLOOD BY AUTOMATED COUNT: 66.9 FL (ref 35.9–50)
ERYTHROCYTE [DISTWIDTH] IN BLOOD BY AUTOMATED COUNT: 67.4 FL (ref 35.9–50)
ERYTHROCYTE [DISTWIDTH] IN BLOOD BY AUTOMATED COUNT: 68.8 FL (ref 35.9–50)
ERYTHROCYTE [DISTWIDTH] IN BLOOD BY AUTOMATED COUNT: 70.3 FL (ref 35.9–50)
ERYTHROCYTE [DISTWIDTH] IN BLOOD BY AUTOMATED COUNT: 71.3 FL (ref 35.9–50)
ERYTHROCYTE [DISTWIDTH] IN BLOOD BY AUTOMATED COUNT: 71.7 FL (ref 35.9–50)
ERYTHROCYTE [DISTWIDTH] IN BLOOD BY AUTOMATED COUNT: 73.2 FL (ref 35.9–50)
FERRITIN SERPL-MCNC: 146 NG/ML (ref 22–322)
FERRITIN SERPL-MCNC: 165 NG/ML (ref 22–322)
FOLATE SERPL-MCNC: 20 NG/ML
GAMMA GLOB SERPL ELPH-MCNC: 0.46 G/DL (ref 0.62–1.51)
GAMMA INTERFERON BACKGROUND BLD IA-ACNC: 0.04 IU/ML
GGT SERPL-CCNC: 373 U/L (ref 7–51)
GGT SERPL-CCNC: 422 U/L (ref 7–51)
GLIADIN PEPTIDE+TTG IGA+IGG SER QL IA: 9 UNITS (ref 0–19)
GLOBULIN SER CALC-MCNC: 1.6 G/DL (ref 1.9–3.5)
GLOBULIN SER CALC-MCNC: 1.9 G/DL (ref 1.9–3.5)
GLOBULIN SER CALC-MCNC: 1.9 G/DL (ref 1.9–3.5)
GLOBULIN SER CALC-MCNC: 2.1 G/DL (ref 1.9–3.5)
GLOBULIN SER CALC-MCNC: 2.2 G/DL (ref 1.9–3.5)
GLOBULIN SER CALC-MCNC: 2.3 G/DL (ref 1.9–3.5)
GLOBULIN SER CALC-MCNC: 2.3 G/DL (ref 1.9–3.5)
GLUCOSE SERPL-MCNC: 100 MG/DL (ref 65–99)
GLUCOSE SERPL-MCNC: 106 MG/DL (ref 65–99)
GLUCOSE SERPL-MCNC: 107 MG/DL (ref 65–99)
GLUCOSE SERPL-MCNC: 115 MG/DL (ref 65–99)
GLUCOSE SERPL-MCNC: 117 MG/DL (ref 65–99)
GLUCOSE SERPL-MCNC: 79 MG/DL (ref 65–99)
GLUCOSE SERPL-MCNC: 82 MG/DL (ref 65–99)
GLUCOSE SERPL-MCNC: 84 MG/DL (ref 65–99)
GLUCOSE SERPL-MCNC: 90 MG/DL (ref 65–99)
GLUCOSE SERPL-MCNC: 91 MG/DL (ref 65–99)
GLUCOSE UR STRIP.AUTO-MCNC: NEGATIVE MG/DL
GLUCOSE UR STRIP.AUTO-MCNC: NEGATIVE MG/DL
GRAM STN SPEC: NORMAL
GRAN CASTS #/AREA URNS LPF: ABNORMAL /LPF
HAPTOGLOB SERPL-MCNC: <10 MG/DL (ref 30–200)
HAV AB SER QL IA: NEGATIVE
HAV IGM SERPL QL IA: NORMAL
HBV CORE IGM SER QL: NORMAL
HBV SURFACE AB SERPL IA-ACNC: <3.5 MIU/ML (ref 0–10)
HBV SURFACE AG SER QL: NORMAL
HCT VFR BLD AUTO: 44.8 % (ref 42–52)
HCT VFR BLD AUTO: 47.9 % (ref 42–52)
HCT VFR BLD AUTO: 50.3 % (ref 42–52)
HCT VFR BLD AUTO: 53 % (ref 42–52)
HCT VFR BLD AUTO: 55.1 % (ref 42–52)
HCT VFR BLD AUTO: 55.5 % (ref 42–52)
HCT VFR BLD AUTO: 58.5 % (ref 42–52)
HCT VFR BLD AUTO: 59 % (ref 42–52)
HCT VFR BLD AUTO: 59.5 % (ref 42–52)
HCT VFR BLD AUTO: 60.1 % (ref 42–52)
HCV AB SER QL: NORMAL
HDLC SERPL-MCNC: 12 MG/DL
HGB BLD-MCNC: 16.6 G/DL (ref 14–18)
HGB BLD-MCNC: 16.6 G/DL (ref 14–18)
HGB BLD-MCNC: 17.6 G/DL (ref 14–18)
HGB BLD-MCNC: 18.9 G/DL (ref 14–18)
HGB BLD-MCNC: 20.4 G/DL (ref 14–18)
HGB BLD-MCNC: 21.4 G/DL (ref 14–18)
HGB BLD-MCNC: 21.5 G/DL (ref 14–18)
HGB BLD-MCNC: 22 G/DL (ref 14–18)
HGB BLD-MCNC: 22 G/DL (ref 14–18)
HGB BLD-MCNC: 22.1 G/DL (ref 14–18)
HGB RETIC QN AUTO: 40.6 PG/CELL (ref 29–35)
HISTIOCYTES NFR FLD: 10 %
HOWELL-JOLLY BOD BLD QL SMEAR: NORMAL
HSV1+2 IGM SER IA-ACNC: 0.61 IV
HYALINE CASTS #/AREA URNS LPF: ABNORMAL /LPF
HYALINE CASTS #/AREA URNS LPF: ABNORMAL /LPF
IGA SERPL-MCNC: 87 MG/DL (ref 68–408)
IGG SERPL-MCNC: 625 MG/DL (ref 768–1632)
IMM GRANULOCYTES # BLD AUTO: 0.01 K/UL (ref 0–0.11)
IMM GRANULOCYTES # BLD AUTO: 0.03 K/UL (ref 0–0.11)
IMM GRANULOCYTES # BLD AUTO: 0.05 K/UL (ref 0–0.11)
IMM GRANULOCYTES # BLD AUTO: 0.06 K/UL (ref 0–0.11)
IMM GRANULOCYTES # BLD AUTO: 0.07 K/UL (ref 0–0.11)
IMM GRANULOCYTES NFR BLD AUTO: 0.2 % (ref 0–0.9)
IMM GRANULOCYTES NFR BLD AUTO: 0.4 % (ref 0–0.9)
IMM GRANULOCYTES NFR BLD AUTO: 0.5 % (ref 0–0.9)
IMM GRANULOCYTES NFR BLD AUTO: 0.5 % (ref 0–0.9)
IMM GRANULOCYTES NFR BLD AUTO: 0.6 % (ref 0–0.9)
IMM GRANULOCYTES NFR BLD AUTO: 0.6 % (ref 0–0.9)
IMM GRANULOCYTES NFR BLD AUTO: 0.7 % (ref 0–0.9)
IMM RETICS NFR: 17.5 % (ref 9.3–17.4)
INR PPP: 1.21 (ref 0.87–1.13)
INR PPP: 1.24 (ref 0.87–1.13)
INR PPP: 1.25 (ref 0.87–1.13)
INR PPP: 1.25 (ref 0.87–1.13)
INR PPP: 1.46 (ref 0.87–1.13)
INR PPP: 1.72 (ref 0.87–1.13)
INTERPRETATION SERPL IFE-IMP: ABNORMAL
IRON SATN MFR SERPL: 19 % (ref 15–55)
IRON SATN MFR SERPL: 29 % (ref 15–55)
IRON SERPL-MCNC: 102 UG/DL (ref 50–180)
IRON SERPL-MCNC: 63 UG/DL (ref 50–180)
KETONES UR STRIP.AUTO-MCNC: NEGATIVE MG/DL
KETONES UR STRIP.AUTO-MCNC: NEGATIVE MG/DL
LACTOFERRIN STL QL IA: NEGATIVE
LDH SERPL L TO P-CCNC: 395 U/L (ref 107–266)
LDLC SERPL CALC-MCNC: 128 MG/DL
LEUKOCYTE ESTERASE UR QL STRIP.AUTO: NEGATIVE
LEUKOCYTE ESTERASE UR QL STRIP.AUTO: NEGATIVE
LIPASE SERPL-CCNC: 25 U/L (ref 11–82)
LIPASE SERPL-CCNC: 25 U/L (ref 11–82)
LKM AB TITR SER IF: NORMAL {TITER}
LV EJECT FRACT  99904: 65
LYMPHOCYTES # BLD AUTO: 0.48 K/UL (ref 1–4.8)
LYMPHOCYTES # BLD AUTO: 0.61 K/UL (ref 1–4.8)
LYMPHOCYTES # BLD AUTO: 0.63 K/UL (ref 1–4.8)
LYMPHOCYTES # BLD AUTO: 0.65 K/UL (ref 1–4.8)
LYMPHOCYTES # BLD AUTO: 0.67 K/UL (ref 1–4.8)
LYMPHOCYTES # BLD AUTO: 0.97 K/UL (ref 1–4.8)
LYMPHOCYTES # BLD AUTO: 1.05 K/UL (ref 1–4.8)
LYMPHOCYTES # BLD AUTO: 1.35 K/UL (ref 1–4.8)
LYMPHOCYTES # BLD AUTO: 1.79 K/UL (ref 1–4.8)
LYMPHOCYTES NFR BLD: 12.5 % (ref 22–41)
LYMPHOCYTES NFR BLD: 15.2 % (ref 22–41)
LYMPHOCYTES NFR BLD: 22.3 % (ref 22–41)
LYMPHOCYTES NFR BLD: 28.4 % (ref 22–41)
LYMPHOCYTES NFR BLD: 4.2 % (ref 22–41)
LYMPHOCYTES NFR BLD: 4.3 % (ref 22–41)
LYMPHOCYTES NFR BLD: 5.5 % (ref 22–41)
LYMPHOCYTES NFR BLD: 6 % (ref 22–41)
LYMPHOCYTES NFR BLD: 6.7 % (ref 22–41)
LYMPHOCYTES NFR FLD: 21 %
M TB IFN-G BLD-IMP: NEGATIVE
M TB IFN-G CD4+ BCKGRND COR BLD-ACNC: 0.01 IU/ML
MACROCYTES BLD QL SMEAR: ABNORMAL
MAGNESIUM SERPL-MCNC: 2.3 MG/DL (ref 1.5–2.5)
MAGNESIUM SERPL-MCNC: 2.5 MG/DL (ref 1.5–2.5)
MANUAL DIFF BLD: NORMAL
MANUAL DIFF BLD: NORMAL
MCH RBC QN AUTO: 35 PG (ref 27–33)
MCH RBC QN AUTO: 35.2 PG (ref 27–33)
MCH RBC QN AUTO: 35.3 PG (ref 27–33)
MCH RBC QN AUTO: 35.4 PG (ref 27–33)
MCH RBC QN AUTO: 35.5 PG (ref 27–33)
MCH RBC QN AUTO: 35.7 PG (ref 27–33)
MCH RBC QN AUTO: 35.8 PG (ref 27–33)
MCH RBC QN AUTO: 36.1 PG (ref 27–33)
MCHC RBC AUTO-ENTMCNC: 34.7 G/DL (ref 33.7–35.3)
MCHC RBC AUTO-ENTMCNC: 35 G/DL (ref 33.7–35.3)
MCHC RBC AUTO-ENTMCNC: 35.7 G/DL (ref 33.7–35.3)
MCHC RBC AUTO-ENTMCNC: 36.8 G/DL (ref 33.7–35.3)
MCHC RBC AUTO-ENTMCNC: 36.8 G/DL (ref 33.7–35.3)
MCHC RBC AUTO-ENTMCNC: 37 G/DL (ref 33.7–35.3)
MCHC RBC AUTO-ENTMCNC: 37.1 G/DL (ref 33.7–35.3)
MCHC RBC AUTO-ENTMCNC: 37.3 G/DL (ref 33.7–35.3)
MCHC RBC AUTO-ENTMCNC: 37.3 G/DL (ref 33.7–35.3)
MCHC RBC AUTO-ENTMCNC: 38.8 G/DL (ref 33.7–35.3)
MCV RBC AUTO: 102.4 FL (ref 81.4–97.8)
MCV RBC AUTO: 104.1 FL (ref 81.4–97.8)
MCV RBC AUTO: 91.8 FL (ref 81.4–97.8)
MCV RBC AUTO: 93.9 FL (ref 81.4–97.8)
MCV RBC AUTO: 95.2 FL (ref 81.4–97.8)
MCV RBC AUTO: 95.2 FL (ref 81.4–97.8)
MCV RBC AUTO: 95.7 FL (ref 81.4–97.8)
MCV RBC AUTO: 96 FL (ref 81.4–97.8)
MCV RBC AUTO: 96.7 FL (ref 81.4–97.8)
MCV RBC AUTO: 99.3 FL (ref 81.4–97.8)
MESOTHL CELL NFR FLD: 1 %
MICRO URNS: ABNORMAL
MICRO URNS: ABNORMAL
MITOCHONDRIA M2 IGG SER-ACNC: 3.5 UNITS (ref 0–24.9)
MITOGEN IGNF BCKGRD COR BLD-ACNC: 5.75 IU/ML
MONOCYTES # BLD AUTO: 0.73 K/UL (ref 0–0.85)
MONOCYTES # BLD AUTO: 0.87 K/UL (ref 0–0.85)
MONOCYTES # BLD AUTO: 0.89 K/UL (ref 0–0.85)
MONOCYTES # BLD AUTO: 0.91 K/UL (ref 0–0.85)
MONOCYTES # BLD AUTO: 0.97 K/UL (ref 0–0.85)
MONOCYTES # BLD AUTO: 0.98 K/UL (ref 0–0.85)
MONOCYTES # BLD AUTO: 1.07 K/UL (ref 0–0.85)
MONOCYTES # BLD AUTO: 1.07 K/UL (ref 0–0.85)
MONOCYTES # BLD AUTO: 1.21 K/UL (ref 0–0.85)
MONOCYTES NFR BLD AUTO: 10.2 % (ref 0–13.4)
MONOCYTES NFR BLD AUTO: 12.7 % (ref 0–13.4)
MONOCYTES NFR BLD AUTO: 14.2 % (ref 0–13.4)
MONOCYTES NFR BLD AUTO: 16.2 % (ref 0–13.4)
MONOCYTES NFR BLD AUTO: 19.2 % (ref 0–13.4)
MONOCYTES NFR BLD AUTO: 6.9 % (ref 0–13.4)
MONOCYTES NFR BLD AUTO: 7.3 % (ref 0–13.4)
MONOCYTES NFR BLD AUTO: 7.6 % (ref 0–13.4)
MONOCYTES NFR BLD AUTO: 7.6 % (ref 0–13.4)
MONONUC CELLS NFR FLD: 55 %
MORPHOLOGY BLD-IMP: NORMAL
MYELOCYTES NFR BLD MANUAL: 0.5 %
NEUTROPHILS # BLD AUTO: 10.11 K/UL (ref 1.82–7.42)
NEUTROPHILS # BLD AUTO: 12.48 K/UL (ref 1.82–7.42)
NEUTROPHILS # BLD AUTO: 2.96 K/UL (ref 1.82–7.42)
NEUTROPHILS # BLD AUTO: 3.56 K/UL (ref 1.82–7.42)
NEUTROPHILS # BLD AUTO: 4.38 K/UL (ref 1.82–7.42)
NEUTROPHILS # BLD AUTO: 6.21 K/UL (ref 1.82–7.42)
NEUTROPHILS # BLD AUTO: 8.54 K/UL (ref 1.82–7.42)
NEUTROPHILS # BLD AUTO: 8.71 K/UL (ref 1.82–7.42)
NEUTROPHILS # BLD AUTO: 9.75 K/UL (ref 1.82–7.42)
NEUTROPHILS NFR BLD: 46.8 % (ref 44–72)
NEUTROPHILS NFR BLD: 58.7 % (ref 44–72)
NEUTROPHILS NFR BLD: 68.5 % (ref 44–72)
NEUTROPHILS NFR BLD: 73.7 % (ref 44–72)
NEUTROPHILS NFR BLD: 82.9 % (ref 44–72)
NEUTROPHILS NFR BLD: 84.8 % (ref 44–72)
NEUTROPHILS NFR BLD: 84.8 % (ref 44–72)
NEUTROPHILS NFR BLD: 86.2 % (ref 44–72)
NEUTROPHILS NFR BLD: 88.1 % (ref 44–72)
NEUTROPHILS NFR FLD: 13 %
NITRITE UR QL STRIP.AUTO: NEGATIVE
NITRITE UR QL STRIP.AUTO: NEGATIVE
NRBC # BLD AUTO: 0 K/UL
NRBC # BLD AUTO: 0.02 K/UL
NRBC # BLD AUTO: 0.03 K/UL
NRBC # BLD AUTO: 0.04 K/UL
NRBC BLD-RTO: 0 /100 WBC
NRBC BLD-RTO: 0.2 /100 WBC
NRBC BLD-RTO: 0.3 /100 WBC
NRBC BLD-RTO: 0.3 /100 WBC
NT-PROBNP SERPL IA-MCNC: 1021 PG/ML (ref 0–125)
NUCLEAR IGG SER QL IA: NORMAL
OVALOCYTES BLD QL SMEAR: NORMAL
OVALOCYTES BLD QL SMEAR: NORMAL
PATHOLOGY CONSULT NOTE: NORMAL
PATHOLOGY CONSULT NOTE: NORMAL
PH UR STRIP.AUTO: 5 [PH] (ref 5–8)
PH UR STRIP.AUTO: 5.5 [PH] (ref 5–8)
PHOSPHATE SERPL-MCNC: 5.3 MG/DL (ref 2.5–4.5)
PHOSPHATE SERPL-MCNC: 5.4 MG/DL (ref 2.5–4.5)
PLATELET # BLD AUTO: 100 K/UL (ref 164–446)
PLATELET # BLD AUTO: 100 K/UL (ref 164–446)
PLATELET # BLD AUTO: 109 K/UL (ref 164–446)
PLATELET # BLD AUTO: 169 K/UL (ref 164–446)
PLATELET # BLD AUTO: 188 K/UL (ref 164–446)
PLATELET # BLD AUTO: 201 K/UL (ref 164–446)
PLATELET # BLD AUTO: 218 K/UL (ref 164–446)
PLATELET # BLD AUTO: 235 K/UL (ref 164–446)
PLATELET # BLD AUTO: 76 K/UL (ref 164–446)
PLATELET # BLD AUTO: 86 K/UL (ref 164–446)
PLATELET BLD QL SMEAR: NORMAL
PMV BLD AUTO: 11.7 FL (ref 9–12.9)
PMV BLD AUTO: 12.1 FL (ref 9–12.9)
PMV BLD AUTO: 12.6 FL (ref 9–12.9)
PMV BLD AUTO: 13 FL (ref 9–12.9)
PMV BLD AUTO: 14.2 FL (ref 9–12.9)
POIKILOCYTOSIS BLD QL SMEAR: NORMAL
POTASSIUM SERPL-SCNC: 3.7 MMOL/L (ref 3.6–5.5)
POTASSIUM SERPL-SCNC: 3.9 MMOL/L (ref 3.6–5.5)
POTASSIUM SERPL-SCNC: 3.9 MMOL/L (ref 3.6–5.5)
POTASSIUM SERPL-SCNC: 4.1 MMOL/L (ref 3.6–5.5)
POTASSIUM SERPL-SCNC: 4.5 MMOL/L (ref 3.6–5.5)
POTASSIUM SERPL-SCNC: 4.6 MMOL/L (ref 3.6–5.5)
POTASSIUM SERPL-SCNC: 4.9 MMOL/L (ref 3.6–5.5)
POTASSIUM SERPL-SCNC: 5 MMOL/L (ref 3.6–5.5)
POTASSIUM SERPL-SCNC: 5.1 MMOL/L (ref 3.6–5.5)
POTASSIUM SERPL-SCNC: 5.1 MMOL/L (ref 3.6–5.5)
PROT FLD-MCNC: 0.3 G/DL
PROT SERPL-MCNC: 4.1 G/DL (ref 6.3–8.2)
PROT SERPL-MCNC: 4.1 G/DL (ref 6–8.2)
PROT SERPL-MCNC: 4.3 G/DL (ref 6–8.2)
PROT SERPL-MCNC: 4.4 G/DL (ref 6–8.2)
PROT SERPL-MCNC: 4.4 G/DL (ref 6–8.2)
PROT SERPL-MCNC: 4.7 G/DL (ref 6–8.2)
PROT SERPL-MCNC: 5 G/DL (ref 6–8.2)
PROT SERPL-MCNC: 5.2 G/DL (ref 6–8.2)
PROT SERPL-MCNC: 5.4 G/DL (ref 6–8.2)
PROT SERPL-MCNC: 5.4 G/DL (ref 6–8.2)
PROT SERPL-MCNC: 5.5 G/DL (ref 6–8.2)
PROT SERPL-MCNC: 5.7 G/DL (ref 6–8.2)
PROT UR QL STRIP: 100 MG/DL
PROT UR QL STRIP: 100 MG/DL
PROTHROMBIN TIME: 15.6 SEC (ref 12–14.6)
PROTHROMBIN TIME: 16 SEC (ref 12–14.6)
PROTHROMBIN TIME: 16.1 SEC (ref 12–14.6)
PROTHROMBIN TIME: 16.1 SEC (ref 12–14.6)
PROTHROMBIN TIME: 18.2 SEC (ref 12–14.6)
PROTHROMBIN TIME: 20.7 SEC (ref 12–14.6)
PSA FREE MFR SERPL: NORMAL %
PSA FREE SERPL-MCNC: NORMAL NG/ML
PSA SERPL-MCNC: <0.1 NG/ML (ref 0–4)
PTH-INTACT SERPL-MCNC: 54.1 PG/ML (ref 14–72)
QFT TB2 - NIL TBQ2: 0.01 IU/ML
QORDR QORDR: NORMAL
RBC # BLD AUTO: 4.6 M/UL (ref 4.7–6.1)
RBC # BLD AUTO: 4.68 M/UL (ref 4.7–6.1)
RBC # BLD AUTO: 4.91 M/UL (ref 4.7–6.1)
RBC # BLD AUTO: 5.34 M/UL (ref 4.7–6.1)
RBC # BLD AUTO: 5.78 M/UL (ref 4.7–6.1)
RBC # BLD AUTO: 6 M/UL (ref 4.7–6.1)
RBC # BLD AUTO: 6.2 M/UL (ref 4.7–6.1)
RBC # BLD AUTO: 6.25 M/UL (ref 4.7–6.1)
RBC # BLD AUTO: 6.4 M/UL (ref 4.7–6.1)
RBC # BLD AUTO: 6.49 M/UL (ref 4.7–6.1)
RBC # FLD: <2000 CELLS/UL
RBC # URNS HPF: ABNORMAL /HPF
RBC # URNS HPF: ABNORMAL /HPF
RBC BLD AUTO: PRESENT
RBC UR QL AUTO: ABNORMAL
RBC UR QL AUTO: NEGATIVE
RETICS # AUTO: 0.16 M/UL (ref 0.04–0.06)
RETICS/RBC NFR: 2.4 % (ref 0.8–2.1)
RETINYL PALMITATE SERPL-MCNC: 0.2 MG/L (ref 0–0.1)
RHODAMINE-AURAMINE STN SPEC: NORMAL
SARS-COV-2 RNA RESP QL NAA+PROBE: NOTDETECTED
SIGNIFICANT IND 70042: NORMAL
SITE SITE: NORMAL
SMA IGG SER-ACNC: 5 UNITS (ref 0–19)
SODIUM SERPL-SCNC: 129 MMOL/L (ref 135–145)
SODIUM SERPL-SCNC: 130 MMOL/L (ref 135–145)
SODIUM SERPL-SCNC: 132 MMOL/L (ref 135–145)
SODIUM SERPL-SCNC: 132 MMOL/L (ref 135–145)
SODIUM SERPL-SCNC: 133 MMOL/L (ref 135–145)
SODIUM SERPL-SCNC: 134 MMOL/L (ref 135–145)
SODIUM SERPL-SCNC: 134 MMOL/L (ref 135–145)
SODIUM SERPL-SCNC: 135 MMOL/L (ref 135–145)
SODIUM SERPL-SCNC: 137 MMOL/L (ref 135–145)
SODIUM SERPL-SCNC: 139 MMOL/L (ref 135–145)
SOURCE SOURCE: NORMAL
SP GR UR STRIP.AUTO: 1.02
SP GR UR STRIP.AUTO: 1.04
SPECIMEN SOURCE: NORMAL
TARGETS BLD QL SMEAR: NORMAL
TEST NAME 95000: NORMAL
TEST NAME 95000: NORMAL
TIBC SERPL-MCNC: 324 UG/DL (ref 250–450)
TIBC SERPL-MCNC: 357 UG/DL (ref 250–450)
TRIGL FLD-MCNC: 25 MG/DL
TRIGL SERPL-MCNC: 123 MG/DL (ref 0–149)
TROPONIN T SERPL-MCNC: 74 NG/L (ref 6–19)
TROPONIN T SERPL-MCNC: 77 NG/L (ref 6–19)
TROPONIN T SERPL-MCNC: 80 NG/L (ref 6–19)
UIBC SERPL-MCNC: 255 UG/DL (ref 110–370)
UIBC SERPL-MCNC: 261 UG/DL (ref 110–370)
URATE SERPL-MCNC: 10.2 MG/DL (ref 2.5–8.3)
URATE SERPL-MCNC: 10.5 MG/DL (ref 2.5–8.3)
UROBILINOGEN UR STRIP.AUTO-MCNC: 1 MG/DL
UROBILINOGEN UR STRIP.AUTO-MCNC: 1 MG/DL
VIT A SERPL-MCNC: 0.25 MG/L (ref 0.3–1.2)
VIT B1 BLD-MCNC: 115 NMOL/L (ref 70–180)
VIT B12 SERPL-MCNC: >4000 PG/ML (ref 211–911)
VIT B2 SERPL-SCNC: 29 NMOL/L (ref 5–50)
VIT B6 SERPL-MCNC: 15.3 NMOL/L (ref 20–125)
VIT C SERPL-MCNC: 33 UMOL/L (ref 23–114)
WBC # BLD AUTO: 10.1 K/UL (ref 4.8–10.8)
WBC # BLD AUTO: 10.3 K/UL (ref 4.8–10.8)
WBC # BLD AUTO: 10.5 K/UL (ref 4.8–10.8)
WBC # BLD AUTO: 10.5 K/UL (ref 4.8–10.8)
WBC # BLD AUTO: 11.5 K/UL (ref 4.8–10.8)
WBC # BLD AUTO: 14.2 K/UL (ref 4.8–10.8)
WBC # BLD AUTO: 6.1 K/UL (ref 4.8–10.8)
WBC # BLD AUTO: 6.3 K/UL (ref 4.8–10.8)
WBC # BLD AUTO: 6.4 K/UL (ref 4.8–10.8)
WBC # BLD AUTO: 8.4 K/UL (ref 4.8–10.8)
WBC # FLD: 86 CELLS/UL
WBC #/AREA URNS HPF: ABNORMAL /HPF
WBC #/AREA URNS HPF: ABNORMAL /HPF

## 2021-01-01 PROCEDURE — 36415 COLL VENOUS BLD VENIPUNCTURE: CPT

## 2021-01-01 PROCEDURE — 82330 ASSAY OF CALCIUM: CPT

## 2021-01-01 PROCEDURE — 89051 BODY FLUID CELL COUNT: CPT

## 2021-01-01 PROCEDURE — 770020 HCHG ROOM/CARE - TELE (206)

## 2021-01-01 PROCEDURE — 99498 ADVNCD CARE PLAN ADDL 30 MIN: CPT | Performed by: STUDENT IN AN ORGANIZED HEALTH CARE EDUCATION/TRAINING PROGRAM

## 2021-01-01 PROCEDURE — 87070 CULTURE OTHR SPECIMN AEROBIC: CPT

## 2021-01-01 PROCEDURE — 85007 BL SMEAR W/DIFF WBC COUNT: CPT

## 2021-01-01 PROCEDURE — 86880 COOMBS TEST DIRECT: CPT

## 2021-01-01 PROCEDURE — 87205 SMEAR GRAM STAIN: CPT

## 2021-01-01 PROCEDURE — 84100 ASSAY OF PHOSPHORUS: CPT

## 2021-01-01 PROCEDURE — 700102 HCHG RX REV CODE 250 W/ 637 OVERRIDE(OP): Performed by: STUDENT IN AN ORGANIZED HEALTH CARE EDUCATION/TRAINING PROGRAM

## 2021-01-01 PROCEDURE — 85025 COMPLETE CBC W/AUTO DIFF WBC: CPT

## 2021-01-01 PROCEDURE — 770001 HCHG ROOM/CARE - MED/SURG/GYN PRIV*

## 2021-01-01 PROCEDURE — 700111 HCHG RX REV CODE 636 W/ 250 OVERRIDE (IP): Performed by: STUDENT IN AN ORGANIZED HEALTH CARE EDUCATION/TRAINING PROGRAM

## 2021-01-01 PROCEDURE — A9576 INJ PROHANCE MULTIPACK: HCPCS | Performed by: FAMILY MEDICINE

## 2021-01-01 PROCEDURE — 99239 HOSP IP/OBS DSCHRG MGMT >30: CPT | Performed by: STUDENT IN AN ORGANIZED HEALTH CARE EDUCATION/TRAINING PROGRAM

## 2021-01-01 PROCEDURE — 86664 EPSTEIN-BARR NUCLEAR ANTIGEN: CPT

## 2021-01-01 PROCEDURE — 88313 SPECIAL STAINS GROUP 2: CPT | Mod: 91

## 2021-01-01 PROCEDURE — 83735 ASSAY OF MAGNESIUM: CPT

## 2021-01-01 PROCEDURE — 88342 IMHCHEM/IMCYTCHM 1ST ANTB: CPT

## 2021-01-01 PROCEDURE — 82306 VITAMIN D 25 HYDROXY: CPT

## 2021-01-01 PROCEDURE — 4410569 US-PARACENTESIS, ABD WITH IMAGING

## 2021-01-01 PROCEDURE — 85046 RETICYTE/HGB CONCENTRATE: CPT

## 2021-01-01 PROCEDURE — 82525 ASSAY OF COPPER: CPT

## 2021-01-01 PROCEDURE — 83550 IRON BINDING TEST: CPT

## 2021-01-01 PROCEDURE — 700111 HCHG RX REV CODE 636 W/ 250 OVERRIDE (IP): Performed by: ANESTHESIOLOGY

## 2021-01-01 PROCEDURE — P9047 ALBUMIN (HUMAN), 25%, 50ML: HCPCS | Mod: JG | Performed by: STUDENT IN AN ORGANIZED HEALTH CARE EDUCATION/TRAINING PROGRAM

## 2021-01-01 PROCEDURE — 87206 SMEAR FLUORESCENT/ACID STAI: CPT

## 2021-01-01 PROCEDURE — 700105 HCHG RX REV CODE 258: Performed by: STUDENT IN AN ORGANIZED HEALTH CARE EDUCATION/TRAINING PROGRAM

## 2021-01-01 PROCEDURE — 87015 SPECIMEN INFECT AGNT CONCNTJ: CPT | Mod: 91

## 2021-01-01 PROCEDURE — 700111 HCHG RX REV CODE 636 W/ 250 OVERRIDE (IP): Performed by: RADIOLOGY

## 2021-01-01 PROCEDURE — 82150 ASSAY OF AMYLASE: CPT

## 2021-01-01 PROCEDURE — 85610 PROTHROMBIN TIME: CPT

## 2021-01-01 PROCEDURE — 0012A MODERNA SARS-COV-2 VACCINE: CPT | Performed by: INTERNAL MEDICINE

## 2021-01-01 PROCEDURE — 0DB68ZX EXCISION OF STOMACH, VIA NATURAL OR ARTIFICIAL OPENING ENDOSCOPIC, DIAGNOSTIC: ICD-10-PCS | Performed by: INTERNAL MEDICINE

## 2021-01-01 PROCEDURE — 83615 LACTATE (LD) (LDH) ENZYME: CPT

## 2021-01-01 PROCEDURE — 51798 US URINE CAPACITY MEASURE: CPT

## 2021-01-01 PROCEDURE — 88112 CYTOPATH CELL ENHANCE TECH: CPT

## 2021-01-01 PROCEDURE — 76700 US EXAM ABDOM COMPLETE: CPT

## 2021-01-01 PROCEDURE — 80053 COMPREHEN METABOLIC PANEL: CPT

## 2021-01-01 PROCEDURE — 82140 ASSAY OF AMMONIA: CPT

## 2021-01-01 PROCEDURE — 96365 THER/PROPH/DIAG IV INF INIT: CPT

## 2021-01-01 PROCEDURE — 87015 SPECIMEN INFECT AGNT CONCNTJ: CPT

## 2021-01-01 PROCEDURE — 86644 CMV ANTIBODY: CPT

## 2021-01-01 PROCEDURE — 83970 ASSAY OF PARATHORMONE: CPT

## 2021-01-01 PROCEDURE — 160048 HCHG OR STATISTICAL LEVEL 1-5: Performed by: INTERNAL MEDICINE

## 2021-01-01 PROCEDURE — 86140 C-REACTIVE PROTEIN: CPT

## 2021-01-01 PROCEDURE — C1729 CATH, DRAINAGE: HCPCS

## 2021-01-01 PROCEDURE — 700111 HCHG RX REV CODE 636 W/ 250 OVERRIDE (IP): Performed by: HOSPITALIST

## 2021-01-01 PROCEDURE — 82668 ASSAY OF ERYTHROPOIETIN: CPT

## 2021-01-01 PROCEDURE — 160208 HCHG ENDO MINUTES - EA ADDL 1 MIN LEVEL 4: Performed by: INTERNAL MEDICINE

## 2021-01-01 PROCEDURE — 93306 TTE W/DOPPLER COMPLETE: CPT | Mod: 26 | Performed by: INTERNAL MEDICINE

## 2021-01-01 PROCEDURE — 86696 HERPES SIMPLEX TYPE 2 TEST: CPT

## 2021-01-01 PROCEDURE — 88312 SPECIAL STAINS GROUP 1: CPT

## 2021-01-01 PROCEDURE — 700111 HCHG RX REV CODE 636 W/ 250 OVERRIDE (IP): Performed by: INTERNAL MEDICINE

## 2021-01-01 PROCEDURE — 84075 ASSAY ALKALINE PHOSPHATASE: CPT

## 2021-01-01 PROCEDURE — 84425 ASSAY OF VITAMIN B-1: CPT

## 2021-01-01 PROCEDURE — 84080 ASSAY ALKALINE PHOSPHATASES: CPT

## 2021-01-01 PROCEDURE — 99497 ADVNCD CARE PLAN 30 MIN: CPT | Performed by: STUDENT IN AN ORGANIZED HEALTH CARE EDUCATION/TRAINING PROGRAM

## 2021-01-01 PROCEDURE — 0FB13ZX EXCISION OF RIGHT LOBE LIVER, PERCUTANEOUS APPROACH, DIAGNOSTIC: ICD-10-PCS | Performed by: RADIOLOGY

## 2021-01-01 PROCEDURE — 700111 HCHG RX REV CODE 636 W/ 250 OVERRIDE (IP): Performed by: EMERGENCY MEDICINE

## 2021-01-01 PROCEDURE — 86665 EPSTEIN-BARR CAPSID VCA: CPT

## 2021-01-01 PROCEDURE — 99233 SBSQ HOSP IP/OBS HIGH 50: CPT | Performed by: STUDENT IN AN ORGANIZED HEALTH CARE EDUCATION/TRAINING PROGRAM

## 2021-01-01 PROCEDURE — 84075 ASSAY ALKALINE PHOSPHATASE: CPT | Mod: XU

## 2021-01-01 PROCEDURE — 0DB98ZX EXCISION OF DUODENUM, VIA NATURAL OR ARTIFICIAL OPENING ENDOSCOPIC, DIAGNOSTIC: ICD-10-PCS | Performed by: INTERNAL MEDICINE

## 2021-01-01 PROCEDURE — 86663 EPSTEIN-BARR ANTIBODY: CPT

## 2021-01-01 PROCEDURE — 84252 ASSAY OF VITAMIN B-2: CPT

## 2021-01-01 PROCEDURE — 97166 OT EVAL MOD COMPLEX 45 MIN: CPT

## 2021-01-01 PROCEDURE — U0005 INFEC AGEN DETEC AMPLI PROBE: HCPCS

## 2021-01-01 PROCEDURE — 84165 PROTEIN E-PHORESIS SERUM: CPT

## 2021-01-01 PROCEDURE — 97162 PT EVAL MOD COMPLEX 30 MIN: CPT

## 2021-01-01 PROCEDURE — A9270 NON-COVERED ITEM OR SERVICE: HCPCS | Performed by: STUDENT IN AN ORGANIZED HEALTH CARE EDUCATION/TRAINING PROGRAM

## 2021-01-01 PROCEDURE — 85730 THROMBOPLASTIN TIME PARTIAL: CPT

## 2021-01-01 PROCEDURE — 160036 HCHG PACU - EA ADDL 30 MINS PHASE I: Performed by: INTERNAL MEDICINE

## 2021-01-01 PROCEDURE — 82728 ASSAY OF FERRITIN: CPT

## 2021-01-01 PROCEDURE — 84484 ASSAY OF TROPONIN QUANT: CPT

## 2021-01-01 PROCEDURE — 80061 LIPID PANEL: CPT

## 2021-01-01 PROCEDURE — 84478 ASSAY OF TRIGLYCERIDES: CPT

## 2021-01-01 PROCEDURE — 86645 CMV ANTIBODY IGM: CPT

## 2021-01-01 PROCEDURE — 99285 EMERGENCY DEPT VISIT HI MDM: CPT

## 2021-01-01 PROCEDURE — 86695 HERPES SIMPLEX TYPE 1 TEST: CPT

## 2021-01-01 PROCEDURE — 85027 COMPLETE CBC AUTOMATED: CPT

## 2021-01-01 PROCEDURE — 82180 ASSAY OF ASCORBIC ACID: CPT

## 2021-01-01 PROCEDURE — 93306 TTE W/DOPPLER COMPLETE: CPT

## 2021-01-01 PROCEDURE — 82746 ASSAY OF FOLIC ACID SERUM: CPT

## 2021-01-01 PROCEDURE — 160203 HCHG ENDO MINUTES - 1ST 30 MINS LEVEL 4: Performed by: INTERNAL MEDICINE

## 2021-01-01 PROCEDURE — 82784 ASSAY IGA/IGD/IGG/IGM EACH: CPT

## 2021-01-01 PROCEDURE — 160009 HCHG ANES TIME/MIN: Performed by: INTERNAL MEDICINE

## 2021-01-01 PROCEDURE — 88305 TISSUE EXAM BY PATHOLOGIST: CPT

## 2021-01-01 PROCEDURE — 700117 HCHG RX CONTRAST REV CODE 255: Performed by: INTERNAL MEDICINE

## 2021-01-01 PROCEDURE — 80076 HEPATIC FUNCTION PANEL: CPT

## 2021-01-01 PROCEDURE — 0011A MODERNA SARS-COV-2 VACCINE: CPT | Performed by: INTERNAL MEDICINE

## 2021-01-01 PROCEDURE — 84443 ASSAY THYROID STIM HORMONE: CPT

## 2021-01-01 PROCEDURE — 87040 BLOOD CULTURE FOR BACTERIA: CPT

## 2021-01-01 PROCEDURE — 74183 MRI ABD W/O CNTR FLWD CNTR: CPT | Mod: MH

## 2021-01-01 PROCEDURE — 74177 CT ABD & PELVIS W/CONTRAST: CPT | Mod: MG

## 2021-01-01 PROCEDURE — 700105 HCHG RX REV CODE 258: Performed by: INTERNAL MEDICINE

## 2021-01-01 PROCEDURE — 0W9G3ZZ DRAINAGE OF PERITONEAL CAVITY, PERCUTANEOUS APPROACH: ICD-10-PCS | Performed by: RADIOLOGY

## 2021-01-01 PROCEDURE — 82248 BILIRUBIN DIRECT: CPT

## 2021-01-01 PROCEDURE — 700105 HCHG RX REV CODE 258: Performed by: EMERGENCY MEDICINE

## 2021-01-01 PROCEDURE — 81001 URINALYSIS AUTO W/SCOPE: CPT

## 2021-01-01 PROCEDURE — C9803 HOPD COVID-19 SPEC COLLECT: HCPCS

## 2021-01-01 PROCEDURE — 88313 SPECIAL STAINS GROUP 2: CPT

## 2021-01-01 PROCEDURE — 82310 ASSAY OF CALCIUM: CPT

## 2021-01-01 PROCEDURE — 83880 ASSAY OF NATRIURETIC PEPTIDE: CPT

## 2021-01-01 PROCEDURE — 160002 HCHG RECOVERY MINUTES (STAT): Performed by: INTERNAL MEDICINE

## 2021-01-01 PROCEDURE — 84153 ASSAY OF PSA TOTAL: CPT

## 2021-01-01 PROCEDURE — 84155 ASSAY OF PROTEIN SERUM: CPT

## 2021-01-01 PROCEDURE — 86694 HERPES SIMPLEX NES ANTBDY: CPT | Mod: 91

## 2021-01-01 PROCEDURE — 83540 ASSAY OF IRON: CPT

## 2021-01-01 PROCEDURE — 99223 1ST HOSP IP/OBS HIGH 75: CPT | Mod: AI | Performed by: STUDENT IN AN ORGANIZED HEALTH CARE EDUCATION/TRAINING PROGRAM

## 2021-01-01 PROCEDURE — 93005 ELECTROCARDIOGRAM TRACING: CPT | Performed by: STUDENT IN AN ORGANIZED HEALTH CARE EDUCATION/TRAINING PROGRAM

## 2021-01-01 PROCEDURE — 91301 MODERNA SARS-COV-2 VACCINE: CPT | Performed by: INTERNAL MEDICINE

## 2021-01-01 PROCEDURE — 88307 TISSUE EXAM BY PATHOLOGIST: CPT

## 2021-01-01 PROCEDURE — 700117 HCHG RX CONTRAST REV CODE 255: Performed by: FAMILY MEDICINE

## 2021-01-01 PROCEDURE — 83516 IMMUNOASSAY NONANTIBODY: CPT | Mod: 91

## 2021-01-01 PROCEDURE — 93010 ELECTROCARDIOGRAM REPORT: CPT | Performed by: INTERNAL MEDICINE

## 2021-01-01 PROCEDURE — 81338 MPL GENE COMMON VARIANTS: CPT

## 2021-01-01 PROCEDURE — 86708 HEPATITIS A ANTIBODY: CPT

## 2021-01-01 PROCEDURE — 84550 ASSAY OF BLOOD/URIC ACID: CPT

## 2021-01-01 PROCEDURE — 86038 ANTINUCLEAR ANTIBODIES: CPT

## 2021-01-01 PROCEDURE — 82607 VITAMIN B-12: CPT

## 2021-01-01 PROCEDURE — 84157 ASSAY OF PROTEIN OTHER: CPT

## 2021-01-01 PROCEDURE — 82977 ASSAY OF GGT: CPT

## 2021-01-01 PROCEDURE — 88305 TISSUE EXAM BY PATHOLOGIST: CPT | Mod: 59

## 2021-01-01 PROCEDURE — 81270 JAK2 GENE: CPT

## 2021-01-01 PROCEDURE — 99232 SBSQ HOSP IP/OBS MODERATE 35: CPT | Performed by: STUDENT IN AN ORGANIZED HEALTH CARE EDUCATION/TRAINING PROGRAM

## 2021-01-01 PROCEDURE — 84207 ASSAY OF VITAMIN B-6: CPT

## 2021-01-01 PROCEDURE — 74181 MRI ABDOMEN W/O CONTRAST: CPT | Mod: MH

## 2021-01-01 PROCEDURE — 83993 ASSAY FOR CALPROTECTIN FECAL: CPT

## 2021-01-01 PROCEDURE — 86706 HEP B SURFACE ANTIBODY: CPT

## 2021-01-01 PROCEDURE — 700111 HCHG RX REV CODE 636 W/ 250 OVERRIDE (IP)

## 2021-01-01 PROCEDURE — 88341 IMHCHEM/IMCYTCHM EA ADD ANTB: CPT

## 2021-01-01 PROCEDURE — 86301 IMMUNOASSAY TUMOR CA 19-9: CPT

## 2021-01-01 PROCEDURE — 86480 TB TEST CELL IMMUN MEASURE: CPT

## 2021-01-01 PROCEDURE — 99153 MOD SED SAME PHYS/QHP EA: CPT

## 2021-01-01 PROCEDURE — 83690 ASSAY OF LIPASE: CPT

## 2021-01-01 PROCEDURE — 80074 ACUTE HEPATITIS PANEL: CPT

## 2021-01-01 PROCEDURE — 700117 HCHG RX CONTRAST REV CODE 255: Performed by: EMERGENCY MEDICINE

## 2021-01-01 PROCEDURE — 82105 ALPHA-FETOPROTEIN SERUM: CPT

## 2021-01-01 PROCEDURE — 83630 LACTOFERRIN FECAL (QUAL): CPT

## 2021-01-01 PROCEDURE — 84590 ASSAY OF VITAMIN A: CPT

## 2021-01-01 PROCEDURE — U0003 INFECTIOUS AGENT DETECTION BY NUCLEIC ACID (DNA OR RNA); SEVERE ACUTE RESPIRATORY SYNDROME CORONAVIRUS 2 (SARS-COV-2) (CORONAVIRUS DISEASE [COVID-19]), AMPLIFIED PROBE TECHNIQUE, MAKING USE OF HIGH THROUGHPUT TECHNOLOGIES AS DESCRIBED BY CMS-2020-01-R: HCPCS

## 2021-01-01 PROCEDURE — 160035 HCHG PACU - 1ST 60 MINS PHASE I: Performed by: INTERNAL MEDICINE

## 2021-01-01 PROCEDURE — 82042 OTHER SOURCE ALBUMIN QUAN EA: CPT

## 2021-01-01 PROCEDURE — 80048 BASIC METABOLIC PNL TOTAL CA: CPT

## 2021-01-01 PROCEDURE — 76705 ECHO EXAM OF ABDOMEN: CPT

## 2021-01-01 PROCEDURE — 83010 ASSAY OF HAPTOGLOBIN QUANT: CPT

## 2021-01-01 PROCEDURE — 82103 ALPHA-1-ANTITRYPSIN TOTAL: CPT

## 2021-01-01 PROCEDURE — 86376 MICROSOMAL ANTIBODY EACH: CPT

## 2021-01-01 PROCEDURE — 81219 CALR GENE COM VARIANTS: CPT

## 2021-01-01 PROCEDURE — 700105 HCHG RX REV CODE 258: Performed by: ANESTHESIOLOGY

## 2021-01-01 PROCEDURE — 87116 MYCOBACTERIA CULTURE: CPT

## 2021-01-01 RX ORDER — TRAMADOL HYDROCHLORIDE 50 MG/1
50 TABLET ORAL
COMMUNITY

## 2021-01-01 RX ORDER — CLONIDINE HYDROCHLORIDE 0.1 MG/1
0.1 TABLET ORAL EVERY 6 HOURS PRN
Status: DISCONTINUED | OUTPATIENT
Start: 2021-01-01 | End: 2021-01-01

## 2021-01-01 RX ORDER — ATROPINE SULFATE 10 MG/ML
2 SOLUTION/ DROPS OPHTHALMIC EVERY 4 HOURS PRN
Status: DISCONTINUED | OUTPATIENT
Start: 2021-01-01 | End: 2021-01-01 | Stop reason: HOSPADM

## 2021-01-01 RX ORDER — SODIUM CHLORIDE 9 MG/ML
500 INJECTION, SOLUTION INTRAVENOUS
Status: ACTIVE | OUTPATIENT
Start: 2021-01-01 | End: 2021-01-01

## 2021-01-01 RX ORDER — FUROSEMIDE 40 MG/1
40 TABLET ORAL DAILY
Status: DISCONTINUED | OUTPATIENT
Start: 2021-01-01 | End: 2021-01-01

## 2021-01-01 RX ORDER — NALOXONE HYDROCHLORIDE 0.4 MG/ML
INJECTION, SOLUTION INTRAMUSCULAR; INTRAVENOUS; SUBCUTANEOUS
Status: COMPLETED
Start: 2021-01-01 | End: 2021-01-01

## 2021-01-01 RX ORDER — PYRIDOXINE HCL (VITAMIN B6) 50 MG
50 TABLET ORAL DAILY
Status: DISCONTINUED | OUTPATIENT
Start: 2021-01-01 | End: 2021-01-01

## 2021-01-01 RX ORDER — AMOXICILLIN 250 MG
2 CAPSULE ORAL 2 TIMES DAILY
Status: DISCONTINUED | OUTPATIENT
Start: 2021-01-01 | End: 2021-01-01

## 2021-01-01 RX ORDER — ONDANSETRON 2 MG/ML
4 INJECTION INTRAMUSCULAR; INTRAVENOUS EVERY 4 HOURS PRN
Status: DISCONTINUED | OUTPATIENT
Start: 2021-01-01 | End: 2021-01-01 | Stop reason: HOSPADM

## 2021-01-01 RX ORDER — LABETALOL HYDROCHLORIDE 5 MG/ML
5 INJECTION, SOLUTION INTRAVENOUS
Status: DISCONTINUED | OUTPATIENT
Start: 2021-01-01 | End: 2021-01-01 | Stop reason: HOSPADM

## 2021-01-01 RX ORDER — ONDANSETRON 4 MG/1
4 TABLET, ORALLY DISINTEGRATING ORAL EVERY 4 HOURS PRN
Status: DISCONTINUED | OUTPATIENT
Start: 2021-01-01 | End: 2021-01-01

## 2021-01-01 RX ORDER — ALBUMIN (HUMAN) 12.5 G/50ML
25 SOLUTION INTRAVENOUS ONCE
Status: COMPLETED | OUTPATIENT
Start: 2021-01-01 | End: 2021-01-01

## 2021-01-01 RX ORDER — LACTULOSE 20 G/30ML
30 SOLUTION ORAL 3 TIMES DAILY
Status: DISCONTINUED | OUTPATIENT
Start: 2021-01-01 | End: 2021-01-01

## 2021-01-01 RX ORDER — ONDANSETRON 2 MG/ML
4 INJECTION INTRAMUSCULAR; INTRAVENOUS PRN
Status: ACTIVE | OUTPATIENT
Start: 2021-01-01 | End: 2021-01-01

## 2021-01-01 RX ORDER — PHENYLEPHRINE HYDROCHLORIDE 10 MG/ML
INJECTION, SOLUTION INTRAMUSCULAR; INTRAVENOUS; SUBCUTANEOUS PRN
Status: DISCONTINUED | OUTPATIENT
Start: 2021-01-01 | End: 2021-01-01 | Stop reason: SURG

## 2021-01-01 RX ORDER — HYDROMORPHONE HYDROCHLORIDE 1 MG/ML
0.1 INJECTION, SOLUTION INTRAMUSCULAR; INTRAVENOUS; SUBCUTANEOUS
Status: DISCONTINUED | OUTPATIENT
Start: 2021-01-01 | End: 2021-01-01 | Stop reason: HOSPADM

## 2021-01-01 RX ORDER — SODIUM BICARBONATE 650 MG/1
650 TABLET ORAL 3 TIMES DAILY
Status: DISCONTINUED | OUTPATIENT
Start: 2021-01-01 | End: 2021-01-01

## 2021-01-01 RX ORDER — SODIUM CHLORIDE, SODIUM LACTATE, POTASSIUM CHLORIDE, CALCIUM CHLORIDE 600; 310; 30; 20 MG/100ML; MG/100ML; MG/100ML; MG/100ML
INJECTION, SOLUTION INTRAVENOUS
Status: DISCONTINUED | OUTPATIENT
Start: 2021-01-01 | End: 2021-01-01 | Stop reason: SURG

## 2021-01-01 RX ORDER — OXYCODONE HYDROCHLORIDE 5 MG/1
5 TABLET ORAL
Status: ACTIVE | OUTPATIENT
Start: 2021-01-01 | End: 2021-01-01

## 2021-01-01 RX ORDER — MORPHINE SULFATE 4 MG/ML
1 INJECTION, SOLUTION INTRAMUSCULAR; INTRAVENOUS
Status: DISCONTINUED | OUTPATIENT
Start: 2021-01-01 | End: 2021-01-01 | Stop reason: HOSPADM

## 2021-01-01 RX ORDER — HYDRALAZINE HYDROCHLORIDE 20 MG/ML
5 INJECTION INTRAMUSCULAR; INTRAVENOUS
Status: DISCONTINUED | OUTPATIENT
Start: 2021-01-01 | End: 2021-01-01 | Stop reason: HOSPADM

## 2021-01-01 RX ORDER — ALLOPURINOL 100 MG/1
100 TABLET ORAL EVERY 8 HOURS
Status: DISCONTINUED | OUTPATIENT
Start: 2021-01-01 | End: 2021-01-01

## 2021-01-01 RX ORDER — FUROSEMIDE 20 MG/1
40 TABLET ORAL DAILY
COMMUNITY

## 2021-01-01 RX ORDER — MIDAZOLAM HYDROCHLORIDE 1 MG/ML
INJECTION INTRAMUSCULAR; INTRAVENOUS
Status: COMPLETED
Start: 2021-01-01 | End: 2021-01-01

## 2021-01-01 RX ORDER — MIDAZOLAM HYDROCHLORIDE 1 MG/ML
.5-2 INJECTION INTRAMUSCULAR; INTRAVENOUS PRN
Status: DISCONTINUED | OUTPATIENT
Start: 2021-01-01 | End: 2021-01-01

## 2021-01-01 RX ORDER — ONDANSETRON 2 MG/ML
4 INJECTION INTRAMUSCULAR; INTRAVENOUS PRN
Status: DISCONTINUED | OUTPATIENT
Start: 2021-01-01 | End: 2021-01-01

## 2021-01-01 RX ORDER — LORAZEPAM 2 MG/ML
0.5 INJECTION INTRAMUSCULAR EVERY 4 HOURS PRN
Status: DISCONTINUED | OUTPATIENT
Start: 2021-01-01 | End: 2021-01-01 | Stop reason: HOSPADM

## 2021-01-01 RX ORDER — ALPRAZOLAM 0.25 MG/1
0.25 TABLET ORAL
Status: DISCONTINUED | OUTPATIENT
Start: 2021-01-01 | End: 2021-01-01

## 2021-01-01 RX ORDER — BISACODYL 10 MG
10 SUPPOSITORY, RECTAL RECTAL
Status: DISCONTINUED | OUTPATIENT
Start: 2021-01-01 | End: 2021-01-01

## 2021-01-01 RX ORDER — SPIRONOLACTONE 25 MG/1
100 TABLET ORAL DAILY
Status: DISCONTINUED | OUTPATIENT
Start: 2021-01-01 | End: 2021-01-01

## 2021-01-01 RX ORDER — POLYETHYLENE GLYCOL 3350 17 G/17G
1 POWDER, FOR SOLUTION ORAL
Status: DISCONTINUED | OUTPATIENT
Start: 2021-01-01 | End: 2021-01-01

## 2021-01-01 RX ORDER — METHYLPREDNISOLONE SODIUM SUCCINATE 125 MG/2ML
60 INJECTION, POWDER, LYOPHILIZED, FOR SOLUTION INTRAMUSCULAR; INTRAVENOUS DAILY
Status: DISCONTINUED | OUTPATIENT
Start: 2021-01-01 | End: 2021-01-01

## 2021-01-01 RX ORDER — TRAMADOL HYDROCHLORIDE 50 MG/1
50 TABLET ORAL EVERY 6 HOURS PRN
Status: DISCONTINUED | OUTPATIENT
Start: 2021-01-01 | End: 2021-01-01

## 2021-01-01 RX ORDER — DIPHENHYDRAMINE HYDROCHLORIDE 50 MG/ML
12.5 INJECTION INTRAMUSCULAR; INTRAVENOUS
Status: DISCONTINUED | OUTPATIENT
Start: 2021-01-01 | End: 2021-01-01 | Stop reason: HOSPADM

## 2021-01-01 RX ORDER — HYDROMORPHONE HYDROCHLORIDE 1 MG/ML
0.4 INJECTION, SOLUTION INTRAMUSCULAR; INTRAVENOUS; SUBCUTANEOUS
Status: DISCONTINUED | OUTPATIENT
Start: 2021-01-01 | End: 2021-01-01 | Stop reason: HOSPADM

## 2021-01-01 RX ORDER — DIPHENHYDRAMINE HYDROCHLORIDE 50 MG/ML
12.5 INJECTION INTRAMUSCULAR; INTRAVENOUS EVERY 6 HOURS PRN
Status: DISCONTINUED | OUTPATIENT
Start: 2021-01-01 | End: 2021-01-01 | Stop reason: HOSPADM

## 2021-01-01 RX ORDER — ACETAMINOPHEN 325 MG/1
650 TABLET ORAL EVERY 6 HOURS PRN
Status: DISCONTINUED | OUTPATIENT
Start: 2021-01-01 | End: 2021-01-01

## 2021-01-01 RX ORDER — HYDROMORPHONE HYDROCHLORIDE 1 MG/ML
0.2 INJECTION, SOLUTION INTRAMUSCULAR; INTRAVENOUS; SUBCUTANEOUS
Status: DISCONTINUED | OUTPATIENT
Start: 2021-01-01 | End: 2021-01-01 | Stop reason: HOSPADM

## 2021-01-01 RX ORDER — SODIUM CHLORIDE, SODIUM LACTATE, POTASSIUM CHLORIDE, CALCIUM CHLORIDE 600; 310; 30; 20 MG/100ML; MG/100ML; MG/100ML; MG/100ML
INJECTION, SOLUTION INTRAVENOUS CONTINUOUS
Status: DISCONTINUED | OUTPATIENT
Start: 2021-01-01 | End: 2021-01-01 | Stop reason: HOSPADM

## 2021-01-01 RX ORDER — ENALAPRILAT 1.25 MG/ML
1.25 INJECTION INTRAVENOUS EVERY 6 HOURS PRN
Status: DISCONTINUED | OUTPATIENT
Start: 2021-01-01 | End: 2021-01-01

## 2021-01-01 RX ORDER — ONDANSETRON 4 MG/1
4 TABLET, ORALLY DISINTEGRATING ORAL EVERY 4 HOURS PRN
Status: DISCONTINUED | OUTPATIENT
Start: 2021-01-01 | End: 2021-01-01 | Stop reason: HOSPADM

## 2021-01-01 RX ORDER — SODIUM CHLORIDE 9 MG/ML
500 INJECTION, SOLUTION INTRAVENOUS ONCE
Status: COMPLETED | OUTPATIENT
Start: 2021-01-01 | End: 2021-01-01

## 2021-01-01 RX ORDER — ALLOPURINOL 100 MG/1
100 TABLET ORAL DAILY
Status: DISCONTINUED | OUTPATIENT
Start: 2021-01-01 | End: 2021-01-01

## 2021-01-01 RX ORDER — SODIUM CHLORIDE 9 MG/ML
INJECTION, SOLUTION INTRAVENOUS CONTINUOUS
Status: DISCONTINUED | OUTPATIENT
Start: 2021-01-01 | End: 2021-01-01 | Stop reason: HOSPADM

## 2021-01-01 RX ORDER — NALOXONE HYDROCHLORIDE 0.4 MG/ML
0.4 INJECTION, SOLUTION INTRAMUSCULAR; INTRAVENOUS; SUBCUTANEOUS ONCE
Status: ACTIVE | OUTPATIENT
Start: 2021-01-01 | End: 2021-01-01

## 2021-01-01 RX ORDER — MIDAZOLAM HYDROCHLORIDE 1 MG/ML
.5-2 INJECTION INTRAMUSCULAR; INTRAVENOUS PRN
Status: ACTIVE | OUTPATIENT
Start: 2021-01-01 | End: 2021-01-01

## 2021-01-01 RX ORDER — ETOMIDATE 2 MG/ML
INJECTION INTRAVENOUS PRN
Status: DISCONTINUED | OUTPATIENT
Start: 2021-01-01 | End: 2021-01-01 | Stop reason: SURG

## 2021-01-01 RX ORDER — MORPHINE SULFATE 4 MG/ML
4 INJECTION, SOLUTION INTRAMUSCULAR; INTRAVENOUS ONCE
Status: DISPENSED | OUTPATIENT
Start: 2021-01-01 | End: 2021-01-01

## 2021-01-01 RX ORDER — DEXAMETHASONE SODIUM PHOSPHATE 4 MG/ML
INJECTION, SOLUTION INTRA-ARTICULAR; INTRALESIONAL; INTRAMUSCULAR; INTRAVENOUS; SOFT TISSUE PRN
Status: DISCONTINUED | OUTPATIENT
Start: 2021-01-01 | End: 2021-01-01 | Stop reason: SURG

## 2021-01-01 RX ORDER — ONDANSETRON 2 MG/ML
4 INJECTION INTRAMUSCULAR; INTRAVENOUS
Status: DISCONTINUED | OUTPATIENT
Start: 2021-01-01 | End: 2021-01-01 | Stop reason: HOSPADM

## 2021-01-01 RX ORDER — ONDANSETRON 2 MG/ML
4 INJECTION INTRAMUSCULAR; INTRAVENOUS ONCE
Status: DISPENSED | OUTPATIENT
Start: 2021-01-01 | End: 2021-01-01

## 2021-01-01 RX ORDER — MIDAZOLAM HYDROCHLORIDE 1 MG/ML
INJECTION INTRAMUSCULAR; INTRAVENOUS PRN
Status: DISCONTINUED | OUTPATIENT
Start: 2021-01-01 | End: 2021-01-01 | Stop reason: SURG

## 2021-01-01 RX ORDER — SPIRONOLACTONE 50 MG/1
100 TABLET, FILM COATED ORAL DAILY
COMMUNITY

## 2021-01-01 RX ADMIN — DOCUSATE SODIUM 50 MG AND SENNOSIDES 8.6 MG 2 TABLET: 8.6; 5 TABLET, FILM COATED ORAL at 17:03

## 2021-01-01 RX ADMIN — LACTULOSE 30 ML: 20 SOLUTION ORAL at 04:29

## 2021-01-01 RX ADMIN — LACTULOSE 30 ML: 20 SOLUTION ORAL at 17:41

## 2021-01-01 RX ADMIN — LACTULOSE 30 ML: 20 SOLUTION ORAL at 04:52

## 2021-01-01 RX ADMIN — METHYLPREDNISOLONE SODIUM SUCCINATE 60 MG: 125 INJECTION, POWDER, FOR SOLUTION INTRAMUSCULAR; INTRAVENOUS at 11:39

## 2021-01-01 RX ADMIN — FAMOTIDINE 20 MG: 10 INJECTION INTRAVENOUS at 07:01

## 2021-01-01 RX ADMIN — METHYLPREDNISOLONE SODIUM SUCCINATE 60 MG: 125 INJECTION, POWDER, FOR SOLUTION INTRAMUSCULAR; INTRAVENOUS at 07:01

## 2021-01-01 RX ADMIN — FUROSEMIDE 40 MG: 40 TABLET ORAL at 04:29

## 2021-01-01 RX ADMIN — FENTANYL CITRATE 25 MCG: 50 INJECTION, SOLUTION INTRAMUSCULAR; INTRAVENOUS at 08:40

## 2021-01-01 RX ADMIN — ENALAPRILAT 1.25 MG: 1.25 INJECTION INTRAVENOUS at 04:27

## 2021-01-01 RX ADMIN — ALPRAZOLAM 0.25 MG: 0.25 TABLET ORAL at 01:45

## 2021-01-01 RX ADMIN — SODIUM CHLORIDE: 9 INJECTION, SOLUTION INTRAVENOUS at 22:17

## 2021-01-01 RX ADMIN — SODIUM CHLORIDE: 9 INJECTION, SOLUTION INTRAVENOUS at 05:34

## 2021-01-01 RX ADMIN — TRAMADOL HYDROCHLORIDE 50 MG: 50 TABLET, FILM COATED ORAL at 22:35

## 2021-01-01 RX ADMIN — FAMOTIDINE 20 MG: 10 INJECTION INTRAVENOUS at 20:22

## 2021-01-01 RX ADMIN — METHYLPREDNISOLONE SODIUM SUCCINATE 60 MG: 125 INJECTION, POWDER, FOR SOLUTION INTRAMUSCULAR; INTRAVENOUS at 05:31

## 2021-01-01 RX ADMIN — PROPOFOL 100 MG: 10 INJECTION, EMULSION INTRAVENOUS at 08:48

## 2021-01-01 RX ADMIN — LORAZEPAM 0.5 MG: 2 INJECTION INTRAMUSCULAR; INTRAVENOUS at 01:17

## 2021-01-01 RX ADMIN — ENOXAPARIN SODIUM 40 MG: 40 INJECTION SUBCUTANEOUS at 05:37

## 2021-01-01 RX ADMIN — PHENYLEPHRINE HYDROCHLORIDE 100 MCG: 10 INJECTION INTRAVENOUS at 09:00

## 2021-01-01 RX ADMIN — SPIRONOLACTONE 100 MG: 25 TABLET ORAL at 04:29

## 2021-01-01 RX ADMIN — IOHEXOL 30 ML: 300 INJECTION, SOLUTION INTRAVENOUS at 09:23

## 2021-01-01 RX ADMIN — FAMOTIDINE 20 MG: 10 INJECTION INTRAVENOUS at 12:43

## 2021-01-01 RX ADMIN — PIPERACILLIN AND TAZOBACTAM 4.5 G: 4; .5 INJECTION, POWDER, LYOPHILIZED, FOR SOLUTION INTRAVENOUS; PARENTERAL at 21:38

## 2021-01-01 RX ADMIN — LACTULOSE 30 ML: 20 SOLUTION ORAL at 17:03

## 2021-01-01 RX ADMIN — FUROSEMIDE 40 MG: 40 TABLET ORAL at 04:52

## 2021-01-01 RX ADMIN — ETOMIDATE 6 MG: 2 INJECTION INTRAVENOUS at 08:48

## 2021-01-01 RX ADMIN — LACTULOSE 30 ML: 20 SOLUTION ORAL at 11:39

## 2021-01-01 RX ADMIN — LORAZEPAM 0.5 MG: 2 INJECTION INTRAMUSCULAR; INTRAVENOUS at 10:17

## 2021-01-01 RX ADMIN — SODIUM CHLORIDE, POTASSIUM CHLORIDE, SODIUM LACTATE AND CALCIUM CHLORIDE: 600; 310; 30; 20 INJECTION, SOLUTION INTRAVENOUS at 08:41

## 2021-01-01 RX ADMIN — SPIRONOLACTONE 100 MG: 25 TABLET ORAL at 05:37

## 2021-01-01 RX ADMIN — ALLOPURINOL 100 MG: 100 TABLET ORAL at 11:39

## 2021-01-01 RX ADMIN — PHYTONADIONE 10 MG: 10 INJECTION, EMULSION INTRAMUSCULAR; INTRAVENOUS; SUBCUTANEOUS at 10:31

## 2021-01-01 RX ADMIN — FAMOTIDINE 20 MG: 10 INJECTION INTRAVENOUS at 17:41

## 2021-01-01 RX ADMIN — MIDAZOLAM HYDROCHLORIDE 1 MG: 1 INJECTION, SOLUTION INTRAMUSCULAR; INTRAVENOUS at 08:41

## 2021-01-01 RX ADMIN — FAMOTIDINE 20 MG: 10 INJECTION INTRAVENOUS at 04:51

## 2021-01-01 RX ADMIN — DOCUSATE SODIUM 50 MG AND SENNOSIDES 8.6 MG 2 TABLET: 8.6; 5 TABLET, FILM COATED ORAL at 05:37

## 2021-01-01 RX ADMIN — SODIUM CHLORIDE: 9 INJECTION, SOLUTION INTRAVENOUS at 14:45

## 2021-01-01 RX ADMIN — ALBUMIN (HUMAN) 25 G: 5 SOLUTION INTRAVENOUS at 00:30

## 2021-01-01 RX ADMIN — SODIUM CHLORIDE 500 ML: 9 INJECTION, SOLUTION INTRAVENOUS at 22:46

## 2021-01-01 RX ADMIN — ALPRAZOLAM 0.25 MG: 0.25 TABLET ORAL at 20:10

## 2021-01-01 RX ADMIN — TRAMADOL HYDROCHLORIDE 50 MG: 50 TABLET, FILM COATED ORAL at 07:41

## 2021-01-01 RX ADMIN — IOHEXOL 100 ML: 350 INJECTION, SOLUTION INTRAVENOUS at 22:20

## 2021-01-01 RX ADMIN — LACTULOSE 30 ML: 20 SOLUTION ORAL at 05:36

## 2021-01-01 RX ADMIN — SODIUM CHLORIDE: 9 INJECTION, SOLUTION INTRAVENOUS at 16:55

## 2021-01-01 RX ADMIN — PHENYLEPHRINE HYDROCHLORIDE 100 MCG: 10 INJECTION INTRAVENOUS at 08:58

## 2021-01-01 RX ADMIN — LORAZEPAM 0.5 MG: 2 INJECTION INTRAMUSCULAR; INTRAVENOUS at 21:27

## 2021-01-01 RX ADMIN — DEXAMETHASONE SODIUM PHOSPHATE 4 MG: 4 INJECTION, SOLUTION INTRA-ARTICULAR; INTRALESIONAL; INTRAMUSCULAR; INTRAVENOUS; SOFT TISSUE at 08:48

## 2021-01-01 RX ADMIN — CEFTRIAXONE SODIUM 1 G: 1 INJECTION, POWDER, FOR SOLUTION INTRAMUSCULAR; INTRAVENOUS at 16:15

## 2021-01-01 RX ADMIN — SODIUM CHLORIDE: 9 INJECTION, SOLUTION INTRAVENOUS at 04:20

## 2021-01-01 RX ADMIN — ALLOPURINOL 100 MG: 100 TABLET ORAL at 21:40

## 2021-01-01 RX ADMIN — PHENYLEPHRINE HYDROCHLORIDE 100 MCG: 10 INJECTION INTRAVENOUS at 08:56

## 2021-01-01 RX ADMIN — Medication 100 MG: at 08:48

## 2021-01-01 RX ADMIN — MIDAZOLAM HYDROCHLORIDE 0.5 MG: 1 INJECTION, SOLUTION INTRAMUSCULAR; INTRAVENOUS at 08:40

## 2021-01-01 RX ADMIN — ENOXAPARIN SODIUM 40 MG: 40 INJECTION SUBCUTANEOUS at 07:02

## 2021-01-01 RX ADMIN — FUROSEMIDE 40 MG: 40 TABLET ORAL at 05:37

## 2021-01-01 RX ADMIN — SPIRONOLACTONE 100 MG: 25 TABLET ORAL at 04:52

## 2021-01-01 RX ADMIN — PHENYLEPHRINE HYDROCHLORIDE 100 MCG: 10 INJECTION INTRAVENOUS at 08:57

## 2021-01-01 RX ADMIN — FENTANYL CITRATE 25 MCG: 50 INJECTION, SOLUTION INTRAMUSCULAR; INTRAVENOUS at 08:43

## 2021-01-01 RX ADMIN — FENTANYL CITRATE 50 MCG: 50 INJECTION, SOLUTION INTRAMUSCULAR; INTRAVENOUS at 08:41

## 2021-01-01 RX ADMIN — GADOTERIDOL 17 ML: 279.3 INJECTION, SOLUTION INTRAVENOUS at 09:58

## 2021-01-01 RX ADMIN — MIDAZOLAM HYDROCHLORIDE 0.5 MG: 1 INJECTION, SOLUTION INTRAMUSCULAR; INTRAVENOUS at 08:43

## 2021-01-01 RX ADMIN — NALOXONE HYDROCHLORIDE 0.4 MG: 0.4 INJECTION, SOLUTION INTRAMUSCULAR; INTRAVENOUS; SUBCUTANEOUS at 10:50

## 2021-01-01 RX ADMIN — SODIUM CHLORIDE: 9 INJECTION, SOLUTION INTRAVENOUS at 16:21

## 2021-01-01 ASSESSMENT — LIFESTYLE VARIABLES
HAVE YOU EVER FELT YOU SHOULD CUT DOWN ON YOUR DRINKING: NO
CONSUMPTION TOTAL: NEGATIVE
CONSUMPTION TOTAL: INCOMPLETE
HOW MANY TIMES IN THE PAST YEAR HAVE YOU HAD 5 OR MORE DRINKS IN A DAY: 0
DO YOU DRINK ALCOHOL: YES
EVER HAD A DRINK FIRST THING IN THE MORNING TO STEADY YOUR NERVES TO GET RID OF A HANGOVER: NO
TOTAL SCORE: 0
HAVE PEOPLE ANNOYED YOU BY CRITICIZING YOUR DRINKING: NO
DOES PATIENT WANT TO STOP DRINKING: NO
TOTAL SCORE: 0
EVER FELT BAD OR GUILTY ABOUT YOUR DRINKING: NO
ON A TYPICAL DAY WHEN YOU DRINK ALCOHOL HOW MANY DRINKS DO YOU HAVE: 1
ON A TYPICAL DAY WHEN YOU DRINK ALCOHOL HOW MANY DRINKS DO YOU HAVE: 1
TOTAL SCORE: 0
AVERAGE NUMBER OF DAYS PER WEEK YOU HAVE A DRINK CONTAINING ALCOHOL: 3
AVERAGE NUMBER OF DAYS PER WEEK YOU HAVE A DRINK CONTAINING ALCOHOL: 1
ALCOHOL_USE: YES

## 2021-01-01 ASSESSMENT — COGNITIVE AND FUNCTIONAL STATUS - GENERAL
DRESSING REGULAR UPPER BODY CLOTHING: A LITTLE
TOILETING: A LITTLE
SUGGESTED CMS G CODE MODIFIER DAILY ACTIVITY: CK
SUGGESTED CMS G CODE MODIFIER MOBILITY: CL
SUGGESTED CMS G CODE MODIFIER DAILY ACTIVITY: CK
MOVING FROM LYING ON BACK TO SITTING ON SIDE OF FLAT BED: A LOT
WALKING IN HOSPITAL ROOM: A LITTLE
HELP NEEDED FOR BATHING: A LITTLE
TOILETING: A LOT
DRESSING REGULAR LOWER BODY CLOTHING: A LITTLE
DRESSING REGULAR LOWER BODY CLOTHING: A LITTLE
TURNING FROM BACK TO SIDE WHILE IN FLAT BAD: A LITTLE
CLIMB 3 TO 5 STEPS WITH RAILING: A LOT
SUGGESTED CMS G CODE MODIFIER MOBILITY: CL
WALKING IN HOSPITAL ROOM: A LOT
TURNING FROM BACK TO SIDE WHILE IN FLAT BAD: A LOT
TOILETING: A LITTLE
EATING MEALS: A LITTLE
DRESSING REGULAR UPPER BODY CLOTHING: A LITTLE
CLIMB 3 TO 5 STEPS WITH RAILING: TOTAL
SUGGESTED CMS G CODE MODIFIER MOBILITY: CK
DAILY ACTIVITIY SCORE: 17
MOVING TO AND FROM BED TO CHAIR: A LOT
MOBILITY SCORE: 12
STANDING UP FROM CHAIR USING ARMS: A LITTLE
DRESSING REGULAR LOWER BODY CLOTHING: A LOT
PERSONAL GROOMING: A LITTLE
MOVING TO AND FROM BED TO CHAIR: A LOT
HELP NEEDED FOR BATHING: A LITTLE
STANDING UP FROM CHAIR USING ARMS: A LITTLE
SUGGESTED CMS G CODE MODIFIER DAILY ACTIVITY: CJ
DAILY ACTIVITIY SCORE: 18
WALKING IN HOSPITAL ROOM: A LITTLE
STANDING UP FROM CHAIR USING ARMS: A LOT
DRESSING REGULAR UPPER BODY CLOTHING: A LITTLE
MOBILITY SCORE: 14
PERSONAL GROOMING: A LITTLE
CLIMB 3 TO 5 STEPS WITH RAILING: A LITTLE
DAILY ACTIVITIY SCORE: 20
MOVING FROM LYING ON BACK TO SITTING ON SIDE OF FLAT BED: A LITTLE
MOVING FROM LYING ON BACK TO SITTING ON SIDE OF FLAT BED: A LOT
MOBILITY SCORE: 19
HELP NEEDED FOR BATHING: A LITTLE
MOVING TO AND FROM BED TO CHAIR: A LITTLE

## 2021-01-01 ASSESSMENT — ENCOUNTER SYMPTOMS
WEIGHT LOSS: 1
NERVOUS/ANXIOUS: 1
SENSORY CHANGE: 0
FEVER: 0
CHILLS: 0
DIARRHEA: 1
CHILLS: 0
ABDOMINAL PAIN: 0
DIZZINESS: 0
VOMITING: 0
BLOOD IN STOOL: 0
SPEECH CHANGE: 0
CHILLS: 0
ABDOMINAL PAIN: 1
DOUBLE VISION: 0
DIZZINESS: 1
NAUSEA: 1
SORE THROAT: 0
HEARTBURN: 0
WEAKNESS: 1
DIZZINESS: 0
FEVER: 0
DOUBLE VISION: 0
FOCAL WEAKNESS: 0
ABDOMINAL PAIN: 1
FOCAL WEAKNESS: 0
SORE THROAT: 0
VOMITING: 0
DIARRHEA: 0
ROS GI COMMENTS: ANOREXIA
SENSORY CHANGE: 0
BLURRED VISION: 0
SORE THROAT: 0
COUGH: 0
NERVOUS/ANXIOUS: 1
FOCAL WEAKNESS: 0
BACK PAIN: 1
PALPITATIONS: 0
SHORTNESS OF BREATH: 1
COUGH: 0
HEADACHES: 0
HEADACHES: 0
ABDOMINAL PAIN: 0
PALPITATIONS: 0
SPEECH CHANGE: 0
WEAKNESS: 1
BACK PAIN: 1
BLURRED VISION: 0
NAUSEA: 1
DIZZINESS: 0
SPEECH CHANGE: 0
WEAKNESS: 1
SENSORY CHANGE: 0
WEIGHT LOSS: 1
BACK PAIN: 1
CONSTIPATION: 0
ABDOMINAL PAIN: 0
CONSTIPATION: 0
NAUSEA: 1
WEIGHT LOSS: 1
HEADACHES: 0
NERVOUS/ANXIOUS: 1
WEAKNESS: 1
SHORTNESS OF BREATH: 1
DOUBLE VISION: 0
HEARTBURN: 0
VOMITING: 0
ABDOMINAL PAIN: 1
NERVOUS/ANXIOUS: 1
BLOOD IN STOOL: 0
BLOOD IN STOOL: 0
DIARRHEA: 1
FEVER: 0
COUGH: 0
CONSTIPATION: 0
SHORTNESS OF BREATH: 1
BLURRED VISION: 0
PALPITATIONS: 0

## 2021-01-01 ASSESSMENT — PAIN DESCRIPTION - PAIN TYPE
TYPE: ACUTE PAIN
TYPE: ACUTE PAIN
TYPE: CHRONIC PAIN
TYPE: ACUTE PAIN

## 2021-01-01 ASSESSMENT — PATIENT HEALTH QUESTIONNAIRE - PHQ9
SUM OF ALL RESPONSES TO PHQ9 QUESTIONS 1 AND 2: 0
2. FEELING DOWN, DEPRESSED, IRRITABLE, OR HOPELESS: NOT AT ALL
1. LITTLE INTEREST OR PLEASURE IN DOING THINGS: NOT AT ALL

## 2021-01-01 ASSESSMENT — ACTIVITIES OF DAILY LIVING (ADL): TOILETING: INDEPENDENT

## 2021-01-01 ASSESSMENT — FIBROSIS 4 INDEX
FIB4 SCORE: 10.9
FIB4 SCORE: 6.32
FIB4 SCORE: 19.54
FIB4 SCORE: 19.54
FIB4 SCORE: 5
FIB4 SCORE: 19.54

## 2021-01-01 ASSESSMENT — GAIT ASSESSMENTS
GAIT LEVEL OF ASSIST: MINIMAL ASSIST
ASSISTIVE DEVICE: FRONT WHEEL WALKER
DEVIATION: SHUFFLED GAIT;BRADYKINETIC;DECREASED BASE OF SUPPORT
DISTANCE (FEET): 10

## 2021-01-01 ASSESSMENT — PAIN SCALES - GENERAL: PAIN_LEVEL: 0

## 2021-04-01 NOTE — PROGRESS NOTES
Outpatient Interventional Radiology RN Note:    US-guided therapeutic paracentesis performed by Dr. Mccord; Procedure explained to patient by MD prior to start and consent obtained, all questions/concerns addressed; No procedural sedation required.    Catheter placed to RLQ by MD; 4020 mL obtained and discarded; Puncture site covered with gauze and tegaderm upon completion; 120 ml of yellow clear fluid, 2 syringes sent lab for diagnostic testing.     Patient tolerated the procedure well, remained hemodynamically stable. Discharge education provided, all questions/concerns addressed; Patient discharged home.

## 2021-04-13 NOTE — ED TRIAGE NOTES
Chief Complaint   Patient presents with   • Abdominal Swelling     pt reports last paracentesis two weeks ago    • Sent by MD     sent by PCP, ultrasound yesterday showed hyperechoic liver. pt to have biopsy 4/16    • Jaundice     worsening over the last few days        Pt to triage via wheelchair for above. Pt states he has not officially been diagnosed with cirrhosis.    Pt is alert and oriented, speaking in full sentences, follows commands and responds appropriately to questions. Resp are even and unlabored.     Pt placed in lobby. Pt educated on triage process and encouraged to alert staff for any changes.      /85   Pulse 85   Temp 36.1 °C (97 °F) (Temporal)   Resp 18   Ht 1.829 m (6')   Wt 81.8 kg (180 lb 5.4 oz)   SpO2 94%

## 2021-04-14 PROBLEM — R79.89 ELEVATED TROPONIN: Status: ACTIVE | Noted: 2021-01-01

## 2021-04-14 PROBLEM — E87.1 HYPONATREMIA: Status: ACTIVE | Noted: 2021-01-01

## 2021-04-14 PROBLEM — E80.6 HYPERBILIRUBINEMIA: Status: ACTIVE | Noted: 2021-01-01

## 2021-04-14 PROBLEM — R18.8 ASCITES: Status: ACTIVE | Noted: 2021-01-01

## 2021-04-14 PROBLEM — I10 HTN (HYPERTENSION): Status: ACTIVE | Noted: 2021-01-01

## 2021-04-14 PROBLEM — R74.01 TRANSAMINITIS: Status: ACTIVE | Noted: 2021-01-01

## 2021-04-14 PROBLEM — E83.52 HYPERCALCEMIA: Status: ACTIVE | Noted: 2021-01-01

## 2021-04-14 NOTE — ED NOTES
Patient states last BM was 2 days ago. Patient states he has not had an appetite for 4 weeks which began with acid reflux and indigestion. Patient ate a small breakfast this am. No  distress. Stomach soft but distended. Bilateral pedal edema 2+.

## 2021-04-14 NOTE — ASSESSMENT & PLAN NOTE
Troponin 77 on arrival, repeat elevated but consistent with initial  Echo 04/13 reviewed showing EF 65%  EKG - sinus rhythm, probable inferior infarct

## 2021-04-14 NOTE — H&P
Hospital Medicine History & Physical Note    Date of Service  4/14/2021    Primary Care Physician  Rikki YOO M.D.    Consultants      Code Status  Full Code    Chief Complaint  Chief Complaint   Patient presents with   • Abdominal Swelling     pt reports last paracentesis two weeks ago    • Sent by MD     sent by PCP, ultrasound yesterday showed hyperechoic liver. pt to have biopsy 4/16    • Jaundice     worsening over the last few days        History of Presenting Illness  80 y.o. male with a past medical history of anxiety, hypertension, hyperlipidemia who presented 4/13/2021 here for evaluation of abdominal swelling, following a visit to his gastroenterologist Dr. Teo Mcmillan. Patient recently had a paracentesis around 2 weeks ago.  He has had worsening jaundice over the last few days as well, which is evident with the initially light yellowing of his eyes, to the not dark or yellow.  He has no fever chills and no vomiting.  He has some mild abdominal distention, no pain to palpation.  He also has some swelling to the lower extremities.  He has no chest pain or shortness of breath.  Nothing seems alleviate exacerbate his symptoms.  He has not taken any new medication.     Wife states that patient has no hx of EtOH and that his edema and jaundice began one month ago.  GI has been determining etiology of patient's jaundice and hyperbilirubinemia outpatient for the past month and sent patient here today.  Wife states that Liver biopsy was scheduled previously.      /104 HR 80  Notable findings include: Hemoglobin 20.1,  hematocrit 60.1, troponin 77, sodium 132, glucose 117, BUN 51, calcium 11.6, AST//60, alk phos 464, total bili 24.8, albumin 3.1    Patient is admitted to hospitalist service for medical management.  GI will continue to follow.  Patient is NPO after midnight in case procedure is indicated.     Review of Systems  Review of Systems   Cardiovascular: Positive for leg swelling.    Skin:        Diffusely jaundiced and scleral icterus   Psychiatric/Behavioral: The patient is nervous/anxious.    All other systems reviewed and are negative.      Past Medical History   has a past medical history of Arthritis, Cancer (HCC), Heart burn, High cholesterol, Hypertension, and Urinary incontinence.    Surgical History   has a past surgical history that includes hip arthroplasty total (Right, 2014); knee arthroplasty total (Left, 2014); trans urethral resection prostate (2005); other; hip arthroplasty total (Left, 6/12/2017); and thoracic fusion o-arm (9/11/2020).     Family History  Non-pertinent    Social History   reports that he has quit smoking. He has never used smokeless tobacco. He reports previous alcohol use. He reports that he does not use drugs.    Allergies  No Known Allergies    Medications  Prior to Admission Medications   Prescriptions Last Dose Informant Patient Reported? Taking?   alprazolam (XANAX) 0.25 MG Tab 4/12/2021 at PM Patient Yes No   Sig: Take 0.25 mg by mouth at bedtime.   furosemide (LASIX) 20 MG Tab 4/13/2021 at 0830 Family Member Yes No   Sig: Take 40 mg by mouth every day. 2 tabs = 40 mg   spironolactone (ALDACTONE) 50 MG Tab 4/13/2021 at 0830 Family Member Yes No   Sig: Take 100 mg by mouth every day. 2 tabs = 100 mg   traMADol (ULTRAM) 50 MG Tab 4/12/2021 at PM Patient Yes No   Sig: Take 50 mg by mouth at bedtime.      Facility-Administered Medications: None       Physical Exam  Temp:  [36.1 °C (97 °F)-36.2 °C (97.2 °F)] 36.2 °C (97.2 °F)  Pulse:  [] 84  Resp:  [12-20] 18  BP: (118-175)/() 128/92  SpO2:  [91 %-98 %] 94 %    Physical Exam     Constitutional: Resting comfortably in NAD   HENT: Normocephalic, no obvious evidence of acute trauma.  Eyes: scleral icterus. Normal conjunctiva   Neck: Comfortable movement without any obvious restriction in the range of motion.  Cardiovascular: Upon ascultation I appreciate a regular heart rhythm and a normal rate  with no murmurs, rubs or gallops  Thorax & Lungs: No respiratory distress. No wheezing, rales or rhonchi heard on ausculation.  there is no obvious chest wall tenderness. I appreciate normal air movement throughout.   Abdomen: The abdomen is not visibly distended. Upon palpation, I find it to be without tenderness.  No mass appreciated.  Skin: diffusely jaundiced throughout with trace edema at b/l LE  Extremities/Musculoskeletal: no asymmetry. Normal ROM  Neurologic: Alert & oriented. No focal deficits observed.   Psychiatric: Normal affect appropriate for the clinical situation.    Laboratory:  Recent Labs     04/12/21 1319 04/13/21 2035   WBC 8.4 10.1   RBC 6.00 6.40*   HEMOGLOBIN 21.4* 22.1*   HEMATOCRIT 55.1* 60.1*   MCV 91.8 93.9   MCH 35.7* 35.0*   MCHC 38.8* 37.3*   RDW 63.6* 68.8*   PLATELETCT 201 169   MPV 12.1  --      Recent Labs     04/12/21  1319 04/13/21 2035   SODIUM 130* 132*   POTASSIUM 4.5 5.1   CHLORIDE 94* 92*   CO2 24 22   GLUCOSE 90 117*   BUN 41* 51*   CREATININE 0.72 0.86   CALCIUM 10.5 11.6*     Recent Labs     04/12/21 1319 04/13/21 2035   ALTSGPT 51* 60*   ASTSGOT 92* 106*   ALKPHOSPHAT 418* 464*   TBILIRUBIN 19.3* 24.8*   DBILIRUBIN >10.0*  --    LIPASE  --  25   GLUCOSE 90 117*     Recent Labs     04/12/21 1319   INR 1.46*     Recent Labs     04/13/21 2035   NTPROBNP 1021*         Recent Labs     04/13/21 2035   TROPONINT 77*       Imaging:  CT-ABDOMEN-PELVIS WITH   Final Result         1.  Enhancement of the gastric mucosa, consider gastritis.   2.  Scattered large quantity of abdominal ascites   3.  Hepatomegaly and diffuse hepatic steatosis   4.  Diverticulosis   5.  Atherosclerosis and atherosclerotic coronary artery disease.            Assessment/Plan:  I anticipate this patient will require at least two midnights for appropriate medical management, necessitating inpatient admission.    Ascites- (present on admission)  Assessment & Plan  CT abd showing: Enhancement of the  gastric mucosa, consider gastritis.  2.  Scattered large quantity of abdominal ascites  3.  Hepatomegaly and diffuse hepatic steatosis  4.  Diverticulosis  5.  Atherosclerosis and atherosclerotic coronary artery disease.    Consider GI consult in AM, follows with Dr. Mcmillan, GI    Hyperbilirubinemia- (present on admission)  Assessment & Plan  Total bili 24.8  Follows with Dr. Mcmillan, GI      Hypercalcemia- (present on admission)  Assessment & Plan  Ca 11.4  Held IVF due to ascites  Continue to monitor      Hyponatremia- (present on admission)  Assessment & Plan  Na 130 on admission  Held IVF due to ascites  Continue to monitor    Elevated troponin- (present on admission)  Assessment & Plan  Troponin 77 on arrival   Repeat troponins ordered to assess  Echo 04/13 reviewed showing EF 65%    Transaminitis- (present on admission)  Assessment & Plan  Ast//60  Follows with GI, Dr. Mcmillan    HTN (hypertension)- (present on admission)  Assessment & Plan  BP on arrival 175/104 HR 80  Clonidine prn  Enalapril prn

## 2021-04-14 NOTE — ED NOTES
Phlebotomy at bedside. CT at bedside. Patient refused morphine and zofran because patient is not in pain and has no nausea at this time.

## 2021-04-14 NOTE — ASSESSMENT & PLAN NOTE
Presented with increasing abdominal distention and ascites, status post paracentesis done approximately 2 weeks ago   CT abd showing: Enhancement of the gastric mucosa, consider gastritis., Scattered large quantity of abdominal ascites, Hepatomegaly and diffuse hepatic steatosis  Unclear etiology at this time, suspect liver disease may be secondary to underlying neoplastic vs lymphoproliferative disorder   Pending liver biopsy pathology   Repeat paracentesis done 4/15 with path pending, no signs of infection etiology, no overt malignant cells  SAAG score >1.1 suggesting portal hypertension is likely cause of ascites   EGD 4/14, gastric biopsies done, path negative for malignancy, celiac's, or amyloidosis  Increasing abdominal girth and tenderness  Patient transition to comfort care measures

## 2021-04-14 NOTE — ASSESSMENT & PLAN NOTE
Ast/ALT continues to increase, unclear etiology. Suspect liver pathology is secondary to possible neoplastic vs lymphoproliferative disorder   Denies alochol use, drug use or significant exposure, hep panel negative   Autoimmune workup thus far negative, imaging not diagnostic   Viral etiologies like EBV, HSV, pending   SPEP pending   Copper pending   Liver biopsy 4/16, path pending   Elevated liver pressure consistent with portal hypertension   Comfort care measures

## 2021-04-14 NOTE — ASSESSMENT & PLAN NOTE
Improving, Na 134, suspect this is due to his liver disease   Now on IVF for hypercalcemia and possible TLS  Comfort care measures

## 2021-04-14 NOTE — ED NOTES
Med Rec completed: per patient at bedside with family   Preferred Pharmacy: [facility] on [address]  Allergies:  No Known Allergies    No ORAL antibiotics in last 14 days    Home Medications:    Medication Sig Comments   • traMADol (ULTRAM) 50 MG Tab Take 50 mg by mouth at bedtime.    • furosemide (LASIX) 20 MG Tab Take 40 mg by mouth every day.   2 tabs = 40 mg Dose was recently increased per family     Alprazolam 0.25 mg tab Take 0.25 mg by mouth at bedtime.    • spironolactone (ALDACTONE) 50 MG Tab Take 100 mg by mouth every day.   2 tabs = 100 mg Dose was recently increased per family     **patient denies any other medications at this time. (was removed from others by pcp about 5-6 weeks ago per daughter)

## 2021-04-14 NOTE — DIETARY
Nutrition services: Day 1 of admit.  Adithya Cohn is a 80 y.o. male with admitting DX of Hyperbilirubinemia. DX includes ascites, hyperbilirubinemia, hypercalcemia, hyponatremia, HTN.      Consult received for MST of 2 on nutrition screen due to report of 2-13 lb loss x 1 month and poor PO PTA.       Assessment:  Height: 182.9 cm (6')  Weight: 80.4 kg (177 lb 4 oz)(stand up scale)  Body mass index is 24.04 kg/m²., BMI classification: WNL  Diet/Intake: 2 gram sodium, low potassium diet    Evaluation:   1. Per H&P, pt with abdominal swelling and last paracentesis 2 weeks ago. Pt also has some swelling to lower extremities. Accuracy of pt's current weight and BMI is questionable due to presence of ascites and edema.   2. RD attempted to meet with pt twice in his room to discuss wt/PO hx. Pt sleeping soundly during both visits. Based on visualization, pt may have some muscle loss in the temple region and fat loss in the orbital region. This may be age related.   3. Wt hx reviewed in Epic. Pt weighed 85 kg on 9/9/20. Weight loss noted of 4.6 kg (5%) x 7 months. Actual time frame of weight loss is unknown. With resolution of edema and ascites, wt loss may be greater.     4. Labs: 4/14/21: sodium=133 (L), DLV=386 (H)  5. Meds: Lasix, lactulose, morphine, Zofran, Aldactone    Malnutrition Risk: unable to determine at this time.    Recommendations/Plan:   1. Encourage intake of >50%  2. Document intake of all meals  as % taken in ADL's to provide interdisciplinary communication across all shifts.   3. Monitor weight.  4. Nutrition rep will continue to see patient for ongoing meal and snack preferences.     RD will follow.

## 2021-04-14 NOTE — ED NOTES
Rounded on patient. Patient unable to provide urine at this time. Patient will try to urinate after CT.  Patient resting in bed. No signs of distress noted. Equal chest rise and fall. No further needs at this time. Call light within reach.

## 2021-04-14 NOTE — THERAPY
Physical Therapy   Initial Evaluation     Patient Name: Adithya Cohn  Age:  80 y.o., Sex:  male  Medical Record #: [id number]  Today's Date: 4/14/2021     Precautions: Fall Risk    Assessment  Patient is 80 y.o. male admitted for ascites, back pain, and weakness.  PMH includes  Prostate cancer, arthritis, urinary incontinence, B total hip replacements, and L total knee replacement. Pt currently requiring min assist for bed mobility, transfers to FWW, and ~10ft of ambulating. Pt is limited by strength, balance, and activity tolerance. PT will cont while pt is in acute care setting.     Plan    Recommend Physical Therapy 3 times per week until therapy goals are met for the following treatments:  Bed Mobility, Gait Training, Neuro Re-Education / Balance, Self Care/Home Evaluation, Stair Training, Therapeutic Activities and Therapeutic Exercises    DC Equipment Recommendations: Unable to determine at this time  Discharge Recommendations: Recommend post-acute placement for additional physical therapy services prior to discharge home          04/14/21 1006   Prior Living Situation   Prior Services None   Housing / Facility 2 Story Apartment / Condo   Steps Into Home   (FOS)   Steps In Home 0   Bathroom Set up Bathtub / Shower Combination   Equipment Owned Front-Wheel Walker;Single Point Cane   Lives with - Patient's Self Care Capacity Spouse   Comments spouse is able to assist as needed   Prior Level of Functional Mobility   Bed Mobility Independent   Transfer Status Independent   Ambulation Independent   Distance Ambulation (Feet)   (short household distances)   Assistive Devices Used Front-Wheel Walker   Stairs Required Assist   Comments spouse with him while negotiating stairs   Cognition    Cognition / Consciousness WDL   Comments flat affect but cooperative   Strength Lower Body   Lower Body Strength  X   Gross Strength Generalized Weakness, Equal Bilaterally   Gait Analysis   Gait Level Of Assist Minimal  Assist   Assistive Device Front Wheel Walker   Distance (Feet) 10   # of Times Distance was Traveled 2   Deviation Shuffled Gait;Bradykinetic;Decreased Base Of Support   # of Stairs Climbed 0   Weight Bearing Status no restrictions   Comments limited by fatigue   Bed Mobility    Supine to Sit Minimal Assist   Sit to Supine Supervised   Scooting Supervised   Functional Mobility   Sit to Stand Minimal Assist   Bed, Chair, Wheelchair Transfer Minimal Assist   Toilet Transfers Minimal Assist   Mobility in room with FWW   Short Term Goals    Short Term Goal # 1 pt will be able to complete bed mobility with spv in 6tx in order to return home   Short Term Goal # 2 pt will be able to complete functional transfers with FWW and spv in 6tx in order to return home   Short Term Goal # 3 pt will be able to ambulate 50ft with fww and spv ni 6tx in order to return home   Short Term Goal # 4 pt will be able to negotiate FOS with SPV ni 6tx in order to return home   Anticipated Discharge Equipment and Recommendations   DC Equipment Recommendations Unable to determine at this time   Discharge Recommendations Recommend post-acute placement for additional physical therapy services prior to discharge home

## 2021-04-14 NOTE — ASSESSMENT & PLAN NOTE
Progressive worsening of bilirubin , bili >30   Unclear etiology at this time, has had extensive work-up with gastroenterology including a negative MRI and CT scan without evidence of liver abnormality or biliary dilation/obstruction or abnormality   Autoimmune liver disease workup thus far negative   Unlikely wilsons given no neurological manifestations however will check serum copper, this is still pending   Iron WNL  Elevated LDH, low haptoglobin, erythrocytosis, possible hemolytic process? Rosendo neg, elevated reticulocyte count which is abnormal   Gastric biopsy showing no evidence of malignancy or deposition   Pending liver biopsy pathology results  Comfort care measures

## 2021-04-14 NOTE — CONSULTS
Gastroenterology Consultation    Date of Service  4/14/2021    Referring Physician  Dorota Darnell M.D.    Consulting Physician  Gokul Jaimes M.D.    Reason for Consultation  Elevated LFTs, ascites    History of Presenting Illness  80 y.o. male with HTN, hyperlipidemia, back pain who presented 4/13/2021 with progressive weakness, edema, bloating.    He has been followed by Dr. Mcmillan as outpatient over the past 2 months for elevated liver tests, jaundice, ascites.  Has had extensive evaluation.    He initially developed some abdominal bloating following back surgery 9/2020.  He was started on tizanidine and it was felt this may be contributing so this was stopped around 12/2020.  Denies other new medications, herbal supplements, OTC meds.  He remembers having fevers around time of back surgery but no other symptoms.    Then, in February, his symptom of bloating was increasing.  He had initial labs 2/2021 showing t bili 3.2, , , alk phos 551.  Labs summer 2020 with normal liver tests.  Since then, his LFTs have continued to rise.  Labs now show t bili 20.7, alk phos 353, AST 90, ALT 51, INR 1.4.  He has had intermittent erythrocytosis with elevated Hgb as well as mild hypercalcemia as well.    Prior evaluation has included the following.  Acute viral hepatitis panel negative. , ferritin 165, iron sat 29%, JOSE negative, Actin Ab negative, LKM Ab negative, IgG low at 625, AMA negative, anti sp 100 neg, anti gp 210 negative, alk phos isoenzymes with high liver and bone fractions, A1AT nl, TSh nl, celiac negative, LDH high at 395, haptoglobin low <10, folate nl, B12 high, Vitamin A slightly low, CA 19-9 nl, AFP nl, PSA nl, TB quantiferon negative, PTH normal, lipase normal.  Abdominal US with normal liver with echogenic liver lesion likely hemangioma, ascites, normal spleen.  MRI abdomen with and without shows hepatic hemangioma, normal liver appearance, normal spleen, normal bile ducts.   Possible thickening stomach.  CT scan shows hepatomegaly but normal liver appearance and normal portal vein.  Again possible thickening of stomach.  Echocardiogram shows EF 65%, diastolic dysfunction, possible dynamic LVOT, normal RV size and function.  Prior paracentesis with cytology only showing some reactive cells but no definite malignancy.    He denies alcohol or other drug use.  Denies any exposure to farm animals, ticks, other sick contacts.     Review of Systems  Review of Systems   Constitutional: Positive for malaise/fatigue and weight loss.   Respiratory: Positive for shortness of breath.    Cardiovascular: Positive for leg swelling.   Gastrointestinal: Positive for nausea.        Anorexia   Musculoskeletal: Positive for back pain.   Neurological: Positive for dizziness and weakness.   All other systems reviewed and are negative.      Past Medical History   has a past medical history of Arthritis, Prostate cancer (HCC), High cholesterol, Hypertension, and Urinary incontinence. He also has no past medical history of Dental disorder.    Surgical History   has a past surgical history that includes hip arthroplasty total (Right, 2014); knee arthroplasty total (Left, 2014); trans urethral resection prostate (2005); other; hip arthroplasty total (Left, 6/12/2017); and thoracic fusion o-arm (9/11/2020).    Family History  Denies liver problems or cancers    Social History   reports that he has quit smoking. He has never used smokeless tobacco. He reports previous alcohol use. He reports that he does not use drugs.    Medications    Current Facility-Administered Medications:   •  cloNIDine  •  lactulose  •  morphine injection  •  ondansetron  •  ALPRAZolam  •  furosemide  •  spironolactone  •  traMADol  •  senna-docusate **AND** polyethylene glycol/lytes **AND** magnesium hydroxide **AND** bisacodyl  •  enoxaparin  •  acetaminophen  •  enalaprilat  •  ondansetron  •  ondansetron      Medications Prior to  Admission   Medication Sig Dispense Refill Last Dose   • traMADol (ULTRAM) 50 MG Tab Take 50 mg by mouth at bedtime.   4/12/2021 at PM   • furosemide (LASIX) 20 MG Tab Take 40 mg by mouth every day. 2 tabs = 40 mg   4/13/2021 at 0830   • spironolactone (ALDACTONE) 50 MG Tab Take 100 mg by mouth every day. 2 tabs = 100 mg   4/13/2021 at 0830   • alprazolam (XANAX) 0.25 MG Tab Take 0.25 mg by mouth at bedtime.   4/12/2021 at PM         Allergies  No Known Allergies    Physical Exam  Temp:  [36.1 °C (97 °F)-36.4 °C (97.6 °F)] 36.4 °C (97.6 °F)  Pulse:  [] 85  Resp:  [12-20] 18  BP: (108-175)/() 136/93  SpO2:  [91 %-98 %] 92 %    Physical Exam  Vitals and nursing note reviewed.   Constitutional:       General: He is not in acute distress.     Appearance: He is ill-appearing.   HENT:      Head: Normocephalic and atraumatic.      Mouth/Throat:      Mouth: Mucous membranes are dry.      Pharynx: Oropharynx is clear. No oropharyngeal exudate or posterior oropharyngeal erythema.   Eyes:      General: Scleral icterus present.      Extraocular Movements: Extraocular movements intact.      Pupils: Pupils are equal, round, and reactive to light.   Cardiovascular:      Rate and Rhythm: Normal rate and regular rhythm.      Pulses: Normal pulses.      Heart sounds: Normal heart sounds. No murmur.   Pulmonary:      Effort: Pulmonary effort is normal.      Breath sounds: Normal breath sounds.   Abdominal:      General: Abdomen is flat. There is distension.      Palpations: Abdomen is soft. There is no mass.      Tenderness: There is no abdominal tenderness. There is no guarding or rebound.      Hernia: No hernia is present.      Comments: Moderate ascites   Musculoskeletal:      Cervical back: Normal range of motion and neck supple.      Right lower leg: Edema present.      Left lower leg: Edema present.   Skin:     Coloration: Skin is jaundiced.      Findings: Rash present.   Neurological:      General: No focal deficit  present.      Mental Status: He is alert and oriented to person, place, and time.   Psychiatric:         Mood and Affect: Mood normal.         Behavior: Behavior normal.         Fluids      Laboratory  Recent Labs     04/12/21 1319 04/13/21 2035 04/14/21 0229   WBC 8.4 10.1 10.5   RBC 6.00 6.40* 4.68*   HEMOGLOBIN 21.4* 22.1* 16.6   HEMATOCRIT 55.1* 60.1* 44.8   MCV 91.8 93.9 95.7   MCH 35.7* 35.0* 35.5*   MCHC 38.8* 37.3* 37.1*   RDW 63.6* 68.8* 71.3*   PLATELETCT 201 169 109*   MPV 12.1  --   --      Recent Labs     04/12/21 1319 04/13/21 2035 04/14/21 0229   SODIUM 130* 132* 133*   POTASSIUM 4.5 5.1 5.0   CHLORIDE 94* 92* 97   CO2 24 22 17*   GLUCOSE 90 117* 107*   BUN 41* 51* 52*   CREATININE 0.72 0.86 0.78   CALCIUM 10.5 11.6* 10.7*     Recent Labs     04/12/21  1319   INR 1.46*          Recent Labs     04/14/21 0229   TRIGLYCERIDE 123   HDL 12*   *        Imaging  CT-ABDOMEN-PELVIS WITH   Final Result         1.  Enhancement of the gastric mucosa, consider gastritis.   2.  Scattered large quantity of abdominal ascites   3.  Hepatomegaly and diffuse hepatic steatosis   4.  Diverticulosis   5.  Atherosclerosis and atherosclerotic coronary artery disease.      US-PARACENTESIS, ABD WITH IMAGING    (Results Pending)   IR-CONSULT AND TREAT    (Results Pending)         Assessment/Plan  79 y/o with HTN, hyperlipidemia, history of prostate cancer with progressive jaundice, ascites with associated erythrocytosis and hypercalcemia of unclear etiology.  Has had extensive evaluation as outpatient without clear cause determined.  Still question malignancy versus DILI versus less likely congestive hepatopathy or autoimmune or infectious related.    PROBLEMS:  1. Jaundice  2. Elevated liver tests  3. Erythrocytosis  4. Hypercalcemia  5. Ascites  6. Generalized weakness  7. Hyponatremia  8. Azotemia  9. Protein-calorie malnutrition, moderate  10. Hypertension  11. Hyperlipidemia  12. History of prostate  cancer    PLAN:  1. Diagnostic paracentesis for cell count, diff, culture, cytology, albumin, protein, TB, amylase  2. Transjugular liver biopsy and hepatic venous pressure gradient by IR  3. EGD for evaluation of gastric thickening on imaging  4. Check additional labs for EBV, CMV, HSV, SPEP, uric acid  5. Recommend hematology evaluation

## 2021-04-14 NOTE — ED NOTES
Medicated pt per MAR. Patient states he will attempt to provide urine sample shortly. Pt denies further needs at this time. Call light within reach.

## 2021-04-14 NOTE — PROGRESS NOTES
RECURRENT ASCITES. DIAGNOSTIC AND THERAPEUTIC PARACENTESIS REQUESTED    RADIOLOGIST:   SONOGRAPHER: CAPRICE    US FINDINGS:  INFORMED CONSENT OBTAINED WITH PATIENT AND RADIOLOGIST WITH MYSELF AS WITNESS AT 1333. SMALL-MODERATE ASCITES SEEN AND SAFE SITE MARKED TO RLQ BY  USING ULTRASOUND IMAGING GUIDANCE. TIME OUT TAKEN AT 1335. - 2050 ML CLEAR BRIGHT YELLOW COLORED PERITONEAL FLUID REMOVED, 1250 ML SENT TO LABORATORY FOR ANALYSIS AND REMAINDER DISCARDED. PT TOLERATED PROCEDURE WELL. SMALL TEGADERM BANDAGE NOW OVER THE PUNCTURE SITE. VITALS MONITORED AND CHARTED THROUGHOUT PROCEDURE. REPORT GIVEN TO RN AT 1400. PT RETURNING TO ROOM WITH TRANSPORT; CURRENTLY IN STABLE CONDITION AT THIS TIME.

## 2021-04-14 NOTE — ED NOTES
Pt to room from lobby in a wheelchair. Patient has a walker. Family at bedside. Changed into gown, connected to monitor. Agree with triage note. Charted for ERP. Call light within reach.

## 2021-04-14 NOTE — ED PROVIDER NOTES
ED Provider Note    CHIEF COMPLAINT  Chief Complaint   Patient presents with   • Abdominal Swelling     pt reports last paracentesis two weeks ago    • Sent by MD     sent by PCP, ultrasound yesterday showed hyperechoic liver. pt to have biopsy 4/16    • Jaundice     worsening over the last few days        HPI  Adithya Cohn is a 80 y.o. male here for evaluation of abdominal swelling.  Patient has had this in the past, and recently had a paracentesis around 2 weeks ago.  He has had worsening jaundice over the last few days as well, which is evident with the initially light yellowing of his eyes, to the not dark or yellow.  He has no fever chills and no vomiting.  He has some mild abdominal distention, no pain to palpation.  He also has some swelling to the lower extremities.  He has no chest pain or shortness of breath.  Nothing seems alleviate exacerbate his symptoms.  He has not taken any new medication.      ROS  See HPI for further details, o/w negative.     PAST MEDICAL HISTORY   has a past medical history of Arthritis, Cancer (HCC), Heart burn, High cholesterol, Hypertension, and Urinary incontinence.    SOCIAL HISTORY  Social History     Tobacco Use   • Smoking status: Former Smoker   • Smokeless tobacco: Never Used   Substance and Sexual Activity   • Alcohol use: Not Currently   • Drug use: No   • Sexual activity: Not on file       Family History  No bleeding disorders    SURGICAL HISTORY   has a past surgical history that includes hip arthroplasty total (Right, 2014); knee arthroplasty total (Left, 2014); trans urethral resection prostate (2005); other; hip arthroplasty total (Left, 6/12/2017); and thoracic fusion o-arm (9/11/2020).    CURRENT MEDICATIONS  Home Medications     Reviewed by Ofelia Darby R.N. (Registered Nurse) on 04/13/21 at 1537  Med List Status: Partial   Medication Last Dose Status   alprazolam (XANAX) 0.25 MG Tab  Active   Ascorbic Acid (VITAMIN C) 1000 MG Tab  Active    Cyanocobalamin (VITAMIN B12 PO)  Active   docusate sodium (COLACE) 100 MG Cap  Active   FUROSEMIDE PO  Active   lisinopril-hydrochlorothiazide (PRINZIDE, ZESTORETIC) 10-12.5 MG per tablet  Active   magnesium hydroxide (MILK OF MAGNESIA) 400 MG/5ML Suspension  Active   Melatonin 5 MG Tab  Active   omeprazole (PRILOSEC) 20 MG delayed-release capsule  Active   spironolactone (ALDACTONE) 50 MG Tab  Active   traMADol (ULTRAM) 50 MG Tab  Active                ALLERGIES  No Known Allergies    REVIEW OF SYSTEMS  See HPI for further details. Review of systems as above, otherwise all other systems are negative.     PHYSICAL EXAM  Constitutional: Well developed, well nourished.  Moderate acute distress.  HEENT: Normocephalic, atraumatic. Posterior pharynx clear and dry  Eyes:  EOMI. scleral icterus  Neck: Supple, Full range of motion, nontender.  Chest/Pulmonary: clear to ausculation. Symmetrical expansion.   Cardio: Regular rate and rhythm with no murmur.   Abdomen: Distention noted, mild tenderness to palpation diffusely.  No peritoneal signs. No guarding. No palpable masses.  Musculoskeletal: No deformity, no edema, neurovascular intact.   Neuro: Clear speech, appropriate, cooperative, cranial nerves II-XII grossly intact.  Psych: Normal mood and affect      Results for orders placed or performed during the hospital encounter of 04/13/21   COMP METABOLIC PANEL   Result Value Ref Range    Sodium 132 (L) 135 - 145 mmol/L    Potassium 5.1 3.6 - 5.5 mmol/L    Chloride 92 (L) 96 - 112 mmol/L    Co2 22 20 - 33 mmol/L    Anion Gap 18.0 (H) 7.0 - 16.0    Glucose 117 (H) 65 - 99 mg/dL    Bun 51 (H) 8 - 22 mg/dL    Creatinine 0.86 0.50 - 1.40 mg/dL    Calcium 11.6 (H) 8.5 - 10.5 mg/dL    AST(SGOT) 106 (H) 12 - 45 U/L    ALT(SGPT) 60 (H) 2 - 50 U/L    Alkaline Phosphatase 464 (H) 30 - 99 U/L    Total Bilirubin 24.8 (H) 0.1 - 1.5 mg/dL    Albumin 3.1 (L) 3.2 - 4.9 g/dL    Total Protein 5.2 (L) 6.0 - 8.2 g/dL    Globulin 2.1 1.9 - 3.5  g/dL    A-G Ratio 1.5 g/dL   LIPASE   Result Value Ref Range    Lipase 25 11 - 82 U/L   TROPONIN   Result Value Ref Range    Troponin T 77 (H) 6 - 19 ng/L   ESTIMATED GFR   Result Value Ref Range    GFR If African American >60 >60 mL/min/1.73 m 2    GFR If Non African American >60 >60 mL/min/1.73 m 2     CT-ABDOMEN-PELVIS WITH   Final Result         1.  Enhancement of the gastric mucosa, consider gastritis.   2.  Scattered large quantity of abdominal ascites   3.  Hepatomegaly and diffuse hepatic steatosis   4.  Diverticulosis   5.  Atherosclerosis and atherosclerotic coronary artery disease.        CRITICAL CARE  The very real possibility of a deterioration of this patient's condition required the highest level of my preparedness for sudden, emergent intervention.  I provided critical care services, which included medication orders, frequent reevaluations of the patient's condition and response to treatment, ordering and reviewing test results, and discussing the case with various consultants.  The critical care time associated with the care of the patient was 47 minutes. Review chart for interventions. This time is exclusive of any other billable procedures.       PROCEDURES     MEDICAL RECORD  I have reviewed patient's medical record and pertinent results are listed.    COURSE & MEDICAL DECISION MAKING  I have reviewed any medical record information, laboratory studies and radiographic results as noted above.    The pt will be admitted to the hospitalist.     HYDRATION: Based on the patient's presentation of Dehydration the patient was given IV fluids. IV Hydration was used because oral hydration was not adequate alone. Upon recheck following hydration, the patient was improved.      FINAL IMPRESSION  1. Jaundice     2.       Critical care 47 minutes.         Electronically signed by: Davin Gallagher D.O., 4/13/2021 9:05 PM

## 2021-04-14 NOTE — PROGRESS NOTES
Pt received from ER and transferred into bed. 2 RN skin check preformed with Frank PATEL. Telemetry placed. Pt comfortable and in no pain at this time. Bed lowered and call bell placed within reach of pt. Will continue to monitor.

## 2021-04-14 NOTE — CARE PLAN
Problem: Safety  Goal: Will remain free from injury  Outcome: PROGRESSING AS EXPECTED  Note: Pt mobility assessed at beginning of shift. Pt is x1 assist. Fall precautions in place. Non-slip socks on. Bed in lowest locked position. Call light within reach. Pt educated to call for assistance and verbalizes understanding.       Problem: Infection  Goal: Will remain free from infection  Outcome: PROGRESSING AS EXPECTED  Note: Pt educated on importance of hand hygiene and oral care. Standard precautions in place.

## 2021-04-14 NOTE — ASSESSMENT & PLAN NOTE
Corrected calcium increasing 11.7-->12.1-> 12.2 0> 12.1  Possible hypercalcemia of malignancy ?  No bony lesions or osteoblastic lesions noted on imaging thus far  Cont. IVF, at family's request  Comfort care measures

## 2021-04-14 NOTE — DISCHARGE PLANNING
"Patient is a 80-year  man who lives in [place], [place]. Patient has [email or web address] insurance.   Has PCP, Dr. Palomino. Reports he is here, because they have done \"so many tests\" and can't figure out what is wrong with him.   Patient reports he is tired, reports no needs.     Case coordination to follow for discharge needs.     Care Transition Team Assessment    Information Source  Orientation : Oriented x 4    Readmission Evaluation  Is this a readmission?: (P) No    Elopement Risk  Legal Hold: No  Ambulatory or Self Mobile in Wheelchair: No-Not an Elopement Risk    Interdisciplinary Discharge Planning  Patient or legal guardian wants to designate a caregiver: Yes  Caregiver name: Sheldon Law  ([facility]) Authorization for Release of Health Information has been completed: Yes    Vision / Hearing Impairment  Vision Impairment : Yes  Right Eye Vision: Wears Glasses  Left Eye Vision: Wears Glasses  Hearing Impairment : No     Advance Directive  Advance Directive?: (P) None    Domestic Abuse  Have you ever been the victim of abuse or violence?: No  Physical Abuse or Sexual Abuse: No  Verbal Abuse or Emotional Abuse: No  Possible Abuse/Neglect Reported to:: Not Applicable                       "

## 2021-04-15 NOTE — PROCEDURES
DATE OF PROCEDURE:  04/15/2021     PROCEDURE:  Upper endoscopy with biopsies.     :  Gokul Jaimes MD     INDICATIONS:  The patient is a 80-year-old gentleman with history of recent   subacute liver failure with jaundice and ascites as well as some upper   abdominal discomfort, scant melena and CT scan showing diffuse thickening of   the stomach.     INFORMED CONSENT:  Written informed consent was obtained from the patient   after thorough explanation of indications, benefits and risks of the   procedure, which included but was not limited to bleeding, infection,   perforation, adverse reaction to medications and missed lesions.     MEDICATIONS GIVEN:  General anesthesia with endotracheal intubation.     Continuous telemetry, BP, pulse oximetry, and capnography were performed by   the anesthesiologist throughout the procedure.     Midsize flexible upper endoscope was used for the procedure.     FINDINGS:  The patient was placed in left lateral decubitus position.  After   anesthesia was achieved, the endoscope was passed in the posterior pharynx   under direct visualization and advanced to the second portion of the duodenum   without difficulty.  The patient tolerated the procedure well with the   following findings:  1.  Esophagus:  Normal without evidence of esophageal varices.  2.  Stomach:  He did have diffuse thickening with associated edema, erythema   and scattered heme throughout the entire stomach.  Random gastric biopsies   were obtained.  We will send a pathology for staining for potential   amyloidosis as well.  Retroflexion revealed no gastric varices.  3.  Duodenum:  Diffuse erythema and edema with loss of villi throughout the   duodenum.  Biopsies obtained.  Again, we will stain for potential amyloidosis.     PLAN:    1.  Await biopsies.  2.  Awaiting liver biopsy.  3.  Repeat CBC and INR.  4.  Start Pepcid 20 mg IV twice daily.  5.  Advance diet after liver  biopsy.        ______________________________  MD TOMMY MOTA/TINA    DD:  04/15/2021 09:20  DT:  04/15/2021 11:23    Job#:  [id number]

## 2021-04-15 NOTE — ANESTHESIA PROCEDURE NOTES
Airway    Date/Time: 4/15/2021 8:50 AM  Performed by: Pablo Barnes M.D.  Authorized by: Pablo Barnes M.D.     Location:  OR  Urgency:  Elective  Indications for Airway Management:  Anesthesia      Spontaneous Ventilation: absent    Sedation Level:  Deep  Preoxygenated: Yes    Patient Position:  Sniffing  Final Airway Type:  Endotracheal airway  Final Endotracheal Airway:  ETT  Cuffed: Yes    Technique Used for Successful ETT Placement:  Direct laryngoscopy  Devices/Methods Used in Placement:  Anterior pressure/BURP    Insertion Site:  Oral  Blade Type:  Glide  Laryngoscope Blade/Videolaryngoscope Blade Size:  4  ETT Size (mm):  7.5  Measured from:  Lips  ETT to Lips (cm):  25  Placement Verified by: auscultation and capnometry    Cormack-Lehane Classification:  Grade I - full view of glottis  Number of Attempts at Approach:  1   Atraumatic x1

## 2021-04-15 NOTE — PROGRESS NOTES
[facility] Medicine Daily Progress Note    Date of Service  4/14/2021    Chief Complaint  80 y.o. male admitted 4/13/2021 with abdominal swelling and jaundice that has been progressive over the last several weeks, patient was sent by his physician to ER for further evaluation    Hospital Course  This is a very pleasant 80-year-old gentleman with a past medical history of hypertension, hyperlipidemia and anxiety who presented on 4/13/2021 for evaluation of abdominal swelling and progressive jaundice.  Patient has been followed by gastroenterology Dr. Mcmillan as outpatient for the last 2 months for elevated liver test and a development of ascites.  He has had extensive evaluation including a negative acute viral hepatitis panel, negative autoimmune work-up including JOSE actin AB, anti-sp, and anti-GP.  Abdominal ultrasound with normal liver and echogenic liver lesion likely hemangioma, MRI with and without showing hepatic hemangioma, normal liver appearance normal spleen and normal bile ducts possible thickening of the stomach was noted.  He underwent CT scan which showed hepatomegaly but normal level appearance with a normal portal vein and again possible thickening of the stomach.  He had an echocardiogram showed an EF of 65%, diastolic dysfunction, possible dynamic LVOT, normal RV size and function.  He underwent prior paracentesis with cytology only showing reactive cells but no definitive malignancy.  Patient has no history of alcohol or other drug use he denies any known significant exposures    On arrival here he had a BP of 175/104 and a heart rate of 80  remarkable labs include hemoglobin of 20.1, hematocrit of 60.1, troponin of 77 sodium of 132, glucose 117, BUN 51, calcium 11.6, AST over /60, alk phos of 464, total bili of 24.8, albumin 3.1.         Interval Problem Update  Patient seen and examined at bedside, overall he feels generally unwell but has no specific complaints.  He has abdominal bloating  without pain  His vitals are stable although he is hypertensive.   Gastroenterology was consulted, plan for EGD tomorrow morning, n.p.o. at midnight  Repeat paracentesis with fluid analysis done today  Recommending a transjugular liver biopsy with pressure measurement  May consult heme-onc pending EGD tomorrow      Consultants/Specialty  Gastroenterology-GI consultants    Code Status  Full Code    Disposition  TBD    Review of Systems  Review of Systems   Constitutional: Positive for malaise/fatigue. Negative for chills and fever.   HENT: Negative for congestion and sore throat.    Eyes: Negative for blurred vision and double vision.   Respiratory: Positive for shortness of breath. Negative for cough.    Cardiovascular: Positive for leg swelling. Negative for chest pain and palpitations.   Gastrointestinal: Positive for nausea. Negative for abdominal pain, blood in stool, constipation, diarrhea, melena and vomiting.   Genitourinary: Negative for dysuria.   Musculoskeletal: Positive for back pain.   Neurological: Positive for weakness. Negative for dizziness, sensory change, speech change, focal weakness and headaches.   Psychiatric/Behavioral: The patient is nervous/anxious.         Physical Exam  Temp:  [36.1 °C (96.9 °F)-36.4 °C (97.6 °F)] 36.4 °C (97.6 °F)  Pulse:  [] 74  Resp:  [12-20] 18  BP: (108-175)/() 149/103  SpO2:  [91 %-99 %] 93 %    Physical Exam  Vitals and nursing note reviewed.   Constitutional:       General: He is not in acute distress.     Appearance: He is normal weight. He is ill-appearing and toxic-appearing.   HENT:      Head: Normocephalic and atraumatic.      Mouth/Throat:      Mouth: Mucous membranes are dry.      Pharynx: Oropharynx is clear.   Eyes:      General: Scleral icterus present.      Extraocular Movements: Extraocular movements intact.      Pupils: Pupils are equal, round, and reactive to light.   Cardiovascular:      Rate and Rhythm: Normal rate and regular rhythm.       Pulses: Normal pulses.      Heart sounds: No murmur.   Pulmonary:      Effort: Pulmonary effort is normal.      Comments: Diminished in bases  Abdominal:      General: There is distension.      Palpations: Abdomen is soft.      Tenderness: There is no abdominal tenderness. There is no guarding or rebound.   Musculoskeletal:      Cervical back: Normal range of motion.      Right lower leg: Edema present.      Left lower leg: Edema present.      Comments: 2+ pitting edema bilaterally up to knees   Skin:     General: Skin is dry.      Capillary Refill: Capillary refill takes 2 to 3 seconds.      Coloration: Skin is jaundiced.      Findings: Bruising present. No rash.   Neurological:      General: No focal deficit present.      Mental Status: He is alert and oriented to person, place, and time.   Psychiatric:         Mood and Affect: Mood normal.         Behavior: Behavior normal.         Fluids    Intake/Output Summary (Last 24 hours) at 4/14/2021 1839  Last data filed at 4/14/2021 1357  Gross per 24 hour   Intake --   Output 2100 ml   Net -2100 ml       Laboratory  Recent Labs     04/12/21  1319 04/13/21 2035 04/14/21 0229   WBC 8.4 10.1 10.5   RBC 6.00 6.40* 4.68*   HEMOGLOBIN 21.4* 22.1* 16.6   HEMATOCRIT 55.1* 60.1* 44.8   MCV 91.8 93.9 95.7   MCH 35.7* 35.0* 35.5*   MCHC 38.8* 37.3* 37.1*   RDW 63.6* 68.8* 71.3*   PLATELETCT 201 169 109*   MPV 12.1  --   --      Recent Labs     04/12/21  1319 04/13/21 2035 04/14/21 0229   SODIUM 130* 132* 133*   POTASSIUM 4.5 5.1 5.0   CHLORIDE 94* 92* 97   CO2 24 22 17*   GLUCOSE 90 117* 107*   BUN 41* 51* 52*   CREATININE 0.72 0.86 0.78   CALCIUM 10.5 11.6* 10.7*     Recent Labs     04/12/21  1319   INR 1.46*         Recent Labs     04/14/21 0229   TRIGLYCERIDE 123   HDL 12*   *       Imaging  US-PARACENTESIS, ABD WITH IMAGING   Final Result      1. Ultrasound-guided therapeutic and diagnostic paracentesis of the right lower quadrant of the abdominal wall.       2. 2050 mL of fluid withdrawn.      CT-ABDOMEN-PELVIS WITH   Final Result         1.  Enhancement of the gastric mucosa, consider gastritis.   2.  Scattered large quantity of abdominal ascites   3.  Hepatomegaly and diffuse hepatic steatosis   4.  Diverticulosis   5.  Atherosclerosis and atherosclerotic coronary artery disease.      [id number]    (Results Pending)        Assessment/Plan  Ascites- (present on admission)  Assessment & Plan  Presented with increasing abdominal distention and ascites, status post paracentesis done approximately 2 weeks ago   CT abd showing: Enhancement of the gastric mucosa, consider gastritis., Scattered large quantity of abdominal ascites, Hepatomegaly and diffuse hepatic steatosis  Unclear etiology at this time  Pending liver biopsy  Repeat paracentesis done today with fluid analysis pending  GI consulted, appreciate their help with management and recommendations, plan for EGD tomorrow morning  Continue Lasix and spironolactone    Hyperbilirubinemia- (present on admission)  Assessment & Plan  Progressive worsening of bilirubin , now total bili 24.8  Unclear etiology at this time, has had extensive work-up with gastroenterology including a relatively negative MRI and CT scan without evidence of liver abnormality or biliary dilation/obstruction or abnormality   Autoimmune liver disease workup thus far negative   Unlikely wilsons given no neurological manifestations however will check serum copper   Iron WNL  Elevated LDH, normal haptoglobin, erythrocytosis   GI following appreciate their help with management and recommendations      Hypercalcemia- (present on admission)  Assessment & Plan  Corrected calcium of 11.7, currently appears to be asymptomatic  Possible hypercalcemia of malignancy ?  No bony lesions or osteoblastic lesions noted on imaging thus far  Holding IV fluids due to ascites requiring diuresis  We will continue to closely monitor, if it continues to increase  we will consider IV fluids versus zoledronic acid vs calcitonin         Hyponatremia- (present on admission)  Assessment & Plan  Na 130 on admission, suspect this is due to his liver disease   Held IVF due to ascites  Continue lasix/spironolactone   Continue to monitor    Elevated troponin- (present on admission)  Assessment & Plan  Troponin 77 on arrival, repeat elevated but consistent with initial  Echo 04/13 reviewed showing EF 65%  EKG pending    Transaminitis- (present on admission)  Assessment & Plan  Ast//60, unclear etiology   Denies alochol use, drug use or significant exposure, hep panel negative   Autoimmune workup thus far negative, imaging not diagnostic   Viral etiologies like EBV pending   Pending LIver biopsy and measurement of transjugular gradient   GI following, appreciate help with management   Cont. To monitor closely     HTN (hypertension)- (present on admission)  Assessment & Plan  BP on arrival 175/104 HR 80  Clonidine prn  Enalapril prn  Continue Lasix and spironolactone  Start maintenance medication if blood pressure remains elevated       VTE prophylaxis: Lovenox

## 2021-04-15 NOTE — DISCHARGE INSTRUCTIONS
ACTIVITY: Rest and take it easy for the first 24 hours.  A responsible adult is recommended to remain with you during that time.  It is normal to feel sleepy.  We encourage you to not do anything that requires balance, judgment or coordination.    MILD FLU-LIKE SYMPTOMS ARE NORMAL. YOU MAY EXPERIENCE GENERALIZED MUSCLE ACHES, THROAT IRRITATION, HEADACHE AND/OR SOME NAUSEA.    FOR 24 HOURS DO NOT:  Drive, operate machinery or run household appliances.  Drink beer or alcoholic beverages.   Make important decisions or sign legal documents.    SPECIAL INSTRUCTIONS: ***    DIET: To avoid nausea, slowly advance diet as tolerated, avoiding spicy or greasy foods for the first day.  Add more substantial food to your diet according to your physician's instructions.  Babies can be fed formula or breast milk as soon as they are hungry.  INCREASE FLUIDS AND FIBER TO AVOID CONSTIPATION.    SURGICAL DRESSING/BATHING: ***    FOLLOW-UP APPOINTMENT:  A follow-up appointment should be arranged with your doctor in ***; call to schedule.    You should CALL YOUR PHYSICIAN if you develop:  Fever greater than 101 degrees F.  Pain not relieved by medication, or persistent nausea or vomiting.  Excessive bleeding (blood soaking through dressing) or unexpected drainage from the wound.  Extreme redness or swelling around the incision site, drainage of pus or foul smelling drainage.  Inability to urinate or empty your bladder within 8 hours.  Problems with breathing or chest pain.    You should call 911 if you develop problems with breathing or chest pain.  If you are unable to contact your doctor or surgical center, you should go to the nearest emergency room or urgent care center.  Physician's telephone #: ***    If any questions arise, call your doctor.  If your doctor is not available, please feel free to call the Surgical Center at {Surgical Dept Numbers:[id number]}. The Contact Center is open Monday through Friday 7AM to 5PM and may speak to  a nurse at [phone], or toll free at [phone].     A registered nurse may call you a few days after your surgery to see how you are doing after your procedure.    MEDICATIONS: Resume taking daily medication.  Take prescribed pain medication with food.  If no medication is prescribed, you may take non-aspirin pain medication if needed.  PAIN MEDICATION CAN BE VERY CONSTIPATING.  Take a stool softener or laxative such as senokot, pericolace, or milk of magnesia if needed.    Prescription given for ***.  Last pain medication given at ***.    If your physician has prescribed pain medication that includes Acetaminophen (Tylenol), do not take additional Acetaminophen (Tylenol) while taking the prescribed medication.    Depression / Suicide Risk    As you are discharged from this [facility] facility, it is important to learn how to keep safe from harming yourself.    Recognize the warning signs:  · Abrupt changes in personality, positive or negative- including increase in energy   · Giving away possessions  · Change in eating patterns- significant weight changes-  positive or negative  · Change in sleeping patterns- unable to sleep or sleeping all the time   · Unwillingness or inability to communicate  · Depression  · Unusual sadness, discouragement and loneliness  · Talk of wanting to die  · Neglect of personal appearance   · Rebelliousness- reckless behavior  · Withdrawal from people/activities they love  · Confusion- inability to concentrate     If you or a loved one observes any of these behaviors or has concerns about self-harm, here's what you can do:  · Talk about it- your feelings and reasons for harming yourself  · Remove any means that you might use to hurt yourself (examples: pills, rope, extension cords, firearm)  · Get professional help from the community (Mental Health, Substance Abuse, psychological counseling)  · Do not be alone:Call your Safe Contact- someone whom you trust who will be there  for you.  · Call your local CRISIS HOTLINE 314-8328 or 323-232-9872  · Call your local Children's Mobile Crisis Response Team [place] (463) 075-5986 or www.[employer]  · Call the toll free National Suicide Prevention Hotlines   · National Suicide Prevention Lifeline 575-695-HINH (6293)  · National [employer] Line Network 800-SUICIDE (759-7237)

## 2021-04-15 NOTE — OR SURGEON
Immediate Post OP Note    PreOp Diagnosis: Abnormal CT scan, abdominal pain, scant melena    PostOp Diagnosis: Diffuse gastritis and duodenitis with thickened folds    Procedure(s):  GASTROSCOPY - Wound Class: Clean Contaminated  GASTROSCOPY, WITH BIOPSY - Wound Class: Clean Contaminated    Surgeon(s):  Gokul Jaimes M.D.    Anesthesiologist/Type of Anesthesia:  Anesthesiologist: Pablo Barnes M.D./General    Surgical Staff:  Endoscopy Technician: Veronica Montemayor  Endoscopy Nurse: PAU Douglass    Specimens removed if any:  ID Type Source Tests Collected by Time Destination   A : BIOPSY Tissue Duodenum PATHOLOGY SPECIMEN Gokul Jaimes M.D. 4/15/2021  9:00 AM    B : BIOPSY R/O AMYLOID Tissue Gastric PATHOLOGY SPECIMEN Gokul Jaimes M.D. 4/15/2021  9:02 AM        Estimated Blood Loss: None    Findings:   1. Diffuse thickening and erythema with scattered small heme and friability to gastric mucosa.  Biopsies obtained.  2. Diffuse edema and erythema with flattening of villi in duodenum.  Biopsies obtained.    Complications: None        4/15/2021 9:06 AM Gokul Jaimes M.D.

## 2021-04-15 NOTE — OR NURSING
0935 pt responding with groans but not opening eyes. Anesthesia at bedside   0945 pt able to open eyes slightly, wiggling toes.   1010 pt continues to wiggle toes, on room air. Resting comfortably with eyes closed. Pt able to cough freely.  1020 pt more awake, opening eyes wider,  moveing around more freely, able to answer questions with yes or no.   1055 report given to Idalia PATEL, transport requested.

## 2021-04-15 NOTE — HOSPITAL COURSE
This is a very pleasant 80-year-old gentleman with a past medical history of hypertension, hyperlipidemia and anxiety who presented on 4/13/2021 for evaluation of abdominal swelling and progressive jaundice.  Patient has been followed by gastroenterology Dr. Mcmillan as outpatient for the last 2 months for elevated liver test and a development of ascites.  He has had extensive evaluation including a negative acute viral hepatitis panel, negative autoimmune work-up including JOSE actin AB, anti-sp, and anti-GP.  Abdominal ultrasound with normal liver and echogenic liver lesion likely hemangioma, MRI with and without showing hepatic hemangioma, normal liver appearance normal spleen and normal bile ducts possible thickening of the stomach was noted.  He underwent CT scan which showed hepatomegaly but normal level appearance with a normal portal vein and again possible thickening of the stomach.  He had an echocardiogram showed an EF of 65%, diastolic dysfunction, possible dynamic LVOT, normal RV size and function.  He underwent prior paracentesis with cytology only showing reactive cells but no definitive malignancy.  Patient has no history of alcohol or other drug use he denies any known significant exposures    On arrival here he had a BP of 175/104 and a heart rate of 80  remarkable labs include hemoglobin of 20.1, hematocrit of 60.1, troponin of 77 sodium of 132, glucose 117, BUN 51, calcium 11.6, AST over /60, alk phos of 464, total bili of 24.8, albumin 3.1.     Since admission patient has underwent EGD with gastric biopsy as well as transjugular liver biopsy.  These results are pending.   He has continued rapid deterioration with worsening liver failure

## 2021-04-15 NOTE — ANESTHESIA POSTPROCEDURE EVALUATION
Patient: Adithya Cohn    Procedure Summary     Date: 04/15/21 Room / Location: [facility] ROOM 26 / SURGERY SAME DAY [facility]    Anesthesia Start: 0841 Anesthesia Stop: 0921    Procedures:       GASTROSCOPY (N/A Esophagus)      GASTROSCOPY, WITH BIOPSY (Esophagus) Diagnosis: (PORTAL GASTROPATHY,DUODENITIS,GASTRITIS)    Surgeons: Gokul Jaimes M.D. Responsible Provider: Pablo Barnes M.D.    Anesthesia Type: general ASA Status: 3          Final Anesthesia Type: general  Last vitals  BP   Blood Pressure : 111/76    Temp   36.2 °C (97.2 °F)    Pulse   76   Resp   (!) 22    SpO2   99 %      Anesthesia Post Evaluation    Patient location during evaluation: PACU  Patient participation: complete - patient participated  Level of consciousness: sleepy but conscious  Pain score: 0    Airway patency: patent  Anesthetic complications: no  Cardiovascular status: hemodynamically stable  Respiratory status: acceptable, oral airway and face mask  Hydration status: euvolemic    PONV: none          No complications documented.     Nurse Pain Score: 6 (NPRS)

## 2021-04-15 NOTE — ANESTHESIA PREPROCEDURE EVALUATION
Anes H&P:  PAST MEDICAL HISTORY:   80 y.o. male who presents for Procedure(s) (LRB):  GASTROSCOPY (N/A).  He has current and past medical problems significant for:    Elevated LFTs with hyperbilirubinemia, ascites drained 4 liters yesterday  Patient denies history of seizures or strokes  Patient denies history of diabetes or thyroid disease  Patient denies history of WILDER  Patient denies history of previous MI, chest pain or shortness of breath  Patient denies history of renal failure  Patient denies history of upper or lower extremity motor/sensory deficits   Patient denies history of bleeding disorders  Patient denies history of complications with anesthesia    Able to climb 2 flights of stair without dyspnea or angina, > 4 METs     Past Medical History:   Diagnosis Date   • Arthritis     general body   • Cancer (HCC)     prostate 2005   • Heart burn     on prilosec   • High cholesterol    • Hypertension    • Urinary incontinence        SMOKING/ALCOHOL/RECREATIONAL DRUG USE:  Social History     Tobacco Use   • Smoking status: Former Smoker   • Smokeless tobacco: Never Used   Substance Use Topics   • Alcohol use: Not Currently   • Drug use: No     Social History     Substance and Sexual Activity   Drug Use No       PAST SURGICAL HISTORY:  Past Surgical History:   Procedure Laterality Date   • THORACIC FUSION O-ARM  9/11/2020    Procedure: Thoracic 10-Lumbar 1 Posterior Lateral fusion Transpedicular approach,  Thoracic 11-12 Discectomy , Thoracic 11-12  laminectomy , with O- arm , with somatosensory Evoked Potential and Electromyography monitoring;  Surgeon: Eliceo Armenta M.D.;  Location: SURGERY [facility];  Service: Neurosurgery   • HIP ARTHROPLASTY TOTAL Left 6/12/2017    Procedure: HIP ARTHROPLASTY TOTAL;  Surgeon: Shay Lafleur M.D.;  Location: SURGERY [facility];  Service:    • HIP ARTHROPLASTY TOTAL Right 2014   • KNEE ARTHROPLASTY TOTAL Left 2014   • TRANS URETHRAL RESECTION PROSTATE  2005   •  OTHER      removal of melanoma  (nasal)       ALLERGIES:   No Known Allergies    MEDICATIONS:  No current facility-administered medications on file prior to encounter.     Current Outpatient Medications on File Prior to Encounter   Medication Sig Dispense Refill   • traMADol (ULTRAM) 50 MG Tab Take 50 mg by mouth at bedtime.     • furosemide (LASIX) 20 MG Tab Take 40 mg by mouth every day. 2 tabs = 40 mg     • spironolactone (ALDACTONE) 50 MG Tab Take 100 mg by mouth every day. 2 tabs = 100 mg     • alprazolam (XANAX) 0.25 MG Tab Take 0.25 mg by mouth at bedtime.         LABS:  Lab Results   Component Value Date/Time    HEMOGLOBIN 16.6 04/14/2021 0229    HEMATOCRIT 44.8 04/14/2021 0229    WBC 10.5 04/14/2021 0229     Lab Results   Component Value Date/Time    SODIUM 129 (L) 04/15/2021 0627    POTASSIUM 4.9 04/15/2021 0627    CHLORIDE 96 04/15/2021 0627    CO2 18 (L) 04/15/2021 0627    GLUCOSE 100 (H) 04/15/2021 0627    BUN 72 (HH) 04/15/2021 0627    CALCIUM 10.9 (H) 04/15/2021 0627       SARS-CoV2 result: Negative      PREVIOUS ANESTHETICS: See EMR  __________________________________________      Relevant Problems   CARDIAC   (+) HTN (hypertension)       Physical Exam    Airway   Mallampati: II  TM distance: >3 FB  Neck ROM: full       Cardiovascular - normal exam  Rhythm: regular  Rate: normal  (-) murmur     Dental - normal exam           Pulmonary - normal exam  Breath sounds clear to auscultation     Abdominal    Neurological - normal exam                 Anesthesia Plan    ASA 3   ASA physical status 3 criteria: active hepatitis    Plan - general       Airway plan will be ETT          Induction: intravenous and rapid sequence    Postoperative Plan: Postoperative administration of opioids is intended.    Pertinent diagnostic labs and testing reviewed    Informed Consent:    Anesthetic plan and risks discussed with patient.    Use of blood products discussed with: patient whom consented to blood products.

## 2021-04-15 NOTE — CARE PLAN
Problem: Communication  Goal: The ability to communicate needs accurately and effectively will improve  Outcome: PROGRESSING AS EXPECTED  Intervention: Reorient patient to environment as needed  Flowsheets (Taken 4/15/2021 1515)  Oriented to:: All of the Following : Location of Bathroom, Visiting Policy, Unit Routine, Call Light and Bedside Controls, Bedside Rail Policy, Smoking Policy, Rights and Responsibilities, Bedside Report, and Patient Education Notebook     Problem: Safety  Goal: Will remain free from injury  Outcome: PROGRESSING AS EXPECTED  Note: Fall risk assessed, fall precautions in place, bed alarm on, pt verbalizes understanding of fall risk.

## 2021-04-15 NOTE — PROGRESS NOTES
Pt back from EGD, pt care assumed, tele box on. Pt denies any additional needs at this time. Bed in lowest position, bed alarm on, pt educated on fall risk and verbalized understanding, call light within reach, wife at bedside.

## 2021-04-15 NOTE — PROGRESS NOTES
Gastroenterology Progress Note     Author: Gokul Jaimes M.D.   Date & Time Created: 4/15/2021 9:10 AM    Chief Complaint:  Jaundice, ascites    History of Presenting Illness  80 y.o. male with HTN, hyperlipidemia, back pain who presented 4/13/2021 with progressive weakness, edema, bloating.     He has been followed by Dr. Mcmillan as outpatient over the past 2 months for elevated liver tests, jaundice, ascites.  Has had extensive evaluation.     He initially developed some abdominal bloating following back surgery 9/2020.  He was started on tizanidine and it was felt this may be contributing so this was stopped around 12/2020.  Denies other new medications, herbal supplements, OTC meds.  He remembers having fevers around time of back surgery but no other symptoms.     Then, in February, his symptom of bloating was increasing.  He had initial labs 2/2021 showing t bili 3.2, , , alk phos 551.  Labs summer 2020 with normal liver tests.  Since then, his LFTs have continued to rise.  Labs now show t bili 20.7, alk phos 353, AST 90, ALT 51, INR 1.4.  He has had intermittent erythrocytosis with elevated Hgb as well as mild hypercalcemia as well.     Prior evaluation has included the following.  Acute viral hepatitis panel negative. , ferritin 165, iron sat 29%, JOSE negative, Actin Ab negative, LKM Ab negative, IgG low at 625, AMA negative, anti sp 100 neg, anti gp 210 negative, alk phos isoenzymes with high liver and bone fractions, A1AT nl, TSh nl, celiac negative, LDH high at 395, haptoglobin low <10, folate nl, B12 high, Vitamin A slightly low, CA 19-9 nl, AFP nl, PSA nl, TB quantiferon negative, PTH normal, lipase normal.  Abdominal US with normal liver with echogenic liver lesion likely hemangioma, ascites, normal spleen.  MRI abdomen with and without shows hepatic hemangioma, normal liver appearance, normal spleen, normal bile ducts.  Possible thickening stomach.  CT scan shows hepatomegaly  but normal liver appearance and normal portal vein.  Again possible thickening of stomach.  Echocardiogram shows EF 65%, diastolic dysfunction, possible dynamic LVOT, normal RV size and function.  Prior paracentesis with cytology only showing some reactive cells but no definite malignancy.     He denies alcohol or other drug use.  Denies any exposure to farm animals, ticks, other sick contacts.     Interval History:  4/15/2021: Still abdominal discomfort.  Some diarrhea with scant melena.  Very weak and tired.  Labs with rising BUN, calcium and bilirubin.  Paracentesis yesterday without SBP but consistent with portal HTN.  Other labs reuested do not appear to have been drawn.    Review of Systems:  Review of Systems   Constitutional: Positive for malaise/fatigue and weight loss. Negative for chills and fever.   Respiratory: Positive for shortness of breath.    Cardiovascular: Positive for leg swelling.   Gastrointestinal: Positive for abdominal pain, diarrhea, melena and nausea. Negative for blood in stool, constipation, heartburn and vomiting.   Neurological: Positive for weakness.       Physical Exam:  Physical Exam  Vitals and nursing note reviewed.   Constitutional:       General: He is not in acute distress.     Appearance: He is ill-appearing.   Eyes:      General: Scleral icterus present.   Cardiovascular:      Rate and Rhythm: Normal rate and regular rhythm.      Pulses: Normal pulses.      Heart sounds: Normal heart sounds.   Pulmonary:      Breath sounds: Decreased breath sounds present.   Abdominal:      General: Abdomen is flat. Bowel sounds are normal. There is distension.      Palpations: Abdomen is soft. There is no mass.      Tenderness: There is no abdominal tenderness. There is no guarding or rebound.      Hernia: No hernia is present.   Skin:     Coloration: Skin is jaundiced.   Neurological:      Mental Status: He is alert. He is disoriented.         Labs:          Recent Labs     04/13/21 2035  04/14/21  0229 04/15/21  0627   SODIUM 132* 133* 129*   POTASSIUM 5.1 5.0 4.9   CHLORIDE 92* 97 96   CO2 22 17* 18*   BUN 51* 52* 72*   CREATININE 0.86 0.78 0.37*   MAGNESIUM 2.3  --   --    CALCIUM 11.6* 10.7* 10.9*     Recent Labs     04/12/21  1319 04/12/21  1319 04/13/21  2035 04/14/21  0229 04/15/21  0627   ALTSGPT 51*   < > 60* 51* 58*   ASTSGOT 92*   < > 106* 90* 116*   ALKPHOSPHAT 418*   < > 464* 353* 354*   TBILIRUBIN 19.3*   < > 24.8* 20.7* 23.1*   DBILIRUBIN >10.0*  --   --   --   --    LIPASE  --   --  25  --   --    GLUCOSE 90   < > 117* 107* 100*    < > = values in this interval not displayed.     Recent Labs     21   RBC 6.00 6.40* 4.68*   HEMOGLOBIN 21.4* 22.1* 16.6   HEMATOCRIT 55.1* 60.1* 44.8   PLATELETCT 201 169 109*   PROTHROMBTM 18.2*  --   --    INR 1.46*  --   --      Recent Labs     04/12/21  1319 04/12/21  1319 04/13/21  2035 04/14/21  0229 04/15/21  0627   WBC 8.4  --  10.1 10.5  --    NEUTSPOLYS 73.70*  --  84.80* 82.90*  --    LYMPHOCYTES 12.50*  --  6.70* 6.00*  --    MONOCYTES 12.70  --  7.30 10.20  --    EOSINOPHILS 0.10  --  0.00 0.00  --    BASOPHILS 0.40  --  0.50 0.30  --    ASTSGOT 92*   < > 106* 90* 116*   ALTSGPT 51*   < > 60* 51* 58*   ALKPHOSPHAT 418*   < > 464* 353* 354*   TBILIRUBIN 19.3*   < > 24.8* 20.7* 23.1*    < > = values in this interval not displayed.     Hemodynamics:  Temp (24hrs), Av.2 °C (97.1 °F), Min:35.8 °C (96.5 °F), Max:36.6 °C (97.8 °F)  Temperature: 36.2 °C (97.2 °F)  Pulse  Av.9  Min: 65  Max: 117   Blood Pressure : 128/83     Respiratory:    Respiration: (Abnormal) 22, Pulse Oximetry: 97 %           Fluids:    Intake/Output Summary (Last 24 hours) at 4/15/2021 0910  Last data filed at 2021 1800  Gross per 24 hour   Intake 240 ml   Output 2450 ml   Net -2210 ml        GI/Nutrition:  Orders Placed This Encounter   Procedures   • Diet NPO     Standing Status:   Standing     Number of Occurrences:   8      Order Specific Question:   Restrict to:     Answer:   Sips with Medications [3]     Medical Decision Making, by Problem:  Active Hospital Problems    Diagnosis    • Hyperbilirubinemia [E80.6]    • Ascites [R18.8]    • HTN (hypertension) [I10]    • Transaminitis [R74.01]    • Elevated troponin [R77.8]    • Hyponatremia [E87.1]    • Hypercalcemia [E83.52]      EGD Findings (4/15/2021):  1. Diffuse thickening and erythema with scattered small heme and friability to gastric mucosa.  Biopsies obtained.  2. Diffuse edema and erythema with flattening of villi in duodenum.  Biopsies obtained.    Assessment/Plan  79 y/o with HTN, hyperlipidemia, history of prostate cancer with progressive jaundice, ascites with associated erythrocytosis and hypercalcemia of unclear etiology.  Has had extensive evaluation as outpatient without clear cause determined.  Still question malignancy versus DILI versus less likely congestive hepatopathy or autoimmune or infectious related.  Also consider amyloid given diffuse thickening of stomach and duodenum on endoscopy.     PROBLEMS:  1. Jaundice  2. Elevated liver tests  3. Erythrocytosis  4. Hypercalcemia  5. Ascites  6. Generalized weakness  7. Hyponatremia  8. Azotemia  9. Protein-calorie malnutrition, moderate  10. Hypertension  11. Hyperlipidemia  12. History of prostate cancer    Plan:  1. Please have labs that were ordered yesterday collected  2. Repeat CBC and INR  3. Awaiting liver biopsy  4. Await biopsies from endoscopy.  Will have pathology stain for possible amyloidosis  5. Check PSA and consider bone scan given history of prostate cancer and elevated calcium  6. OK to advance diet after liver biopsy  7. Pepcid 20 mg BID IV  8. Consider hematology evaluation     Quality-Core Measures   Reviewed items::  Medications reviewed, Labs reviewed and Radiology images reviewed

## 2021-04-15 NOTE — ANESTHESIA TIME REPORT
Anesthesia Start and Stop Event Times     Date Time Event    4/15/2021 0825 Ready for Procedure     0841 Anesthesia Start     0921 Anesthesia Stop        Responsible Staff  04/15/21    Name Role Begin End    Pablo Barnes M.D. Anesth 0841 0921        Preop Diagnosis (Free Text):  Pre-op Diagnosis     ELEVATED LFTs,ASCITES        Preop Diagnosis (Codes):    Post op Diagnosis  Ascites      Premium Reason  Non-Premium    Comments:

## 2021-04-16 PROBLEM — E88.3 TUMOR LYSIS SYNDROME: Status: ACTIVE | Noted: 2021-01-01

## 2021-04-16 NOTE — ASSESSMENT & PLAN NOTE
Possible TLS, elevated phos and Uric acid in setting of possible underlying malignancy, stable   - Calcium is high which is not consistent but still with elevated Uric acid and Phos   - Continue IV fluids  -Comfort care measures

## 2021-04-16 NOTE — OR SURGEON
Immediate Post- Operative Note        PostOp Diagnosis: Liver Failure      Procedure(s): Portal Pressures and Transjugular Liver Biopsy    Estimated Blood Loss: Less than 5 ml        Complications: None            4/16/2021     10:12 AM     Vadim Malcolm M.D.

## 2021-04-16 NOTE — PROGRESS NOTES
IR Nursing Note      Transjugular Liver Biopsy by MD Malcolm assisted by RT Krystal, Right IJ access site.    Hepatic Wedge Pressure: 25  Hepatic Free Pressure: 9    5 cores in Formalin collected by Dr. Malcolm, sent to pathology, delivered by Estefani PATEL.    Access site, sealed with 10 minutes manual pressure, covered with gauze/tegaderm, C/D/I.    Report given to AMANDA Gerber.  Patient transported to [facility] via IR AMANDA Jara monitored then transferred care to report RN.

## 2021-04-16 NOTE — PROGRESS NOTES
[facility] Medicine Daily Progress Note    Date of Service  4/15/2021    Chief Complaint  80 y.o. male admitted 4/13/2021 with abdominal swelling and jaundice that has been progressive over the last several weeks, patient was sent by his physician to ER for further evaluation    Hospital Course  This is a very pleasant 80-year-old gentleman with a past medical history of hypertension, hyperlipidemia and anxiety who presented on 4/13/2021 for evaluation of abdominal swelling and progressive jaundice.  Patient has been followed by gastroenterology Dr. Mcmillan as outpatient for the last 2 months for elevated liver test and a development of ascites.  He has had extensive evaluation including a negative acute viral hepatitis panel, negative autoimmune work-up including JOSE actin AB, anti-sp, and anti-GP.  Abdominal ultrasound with normal liver and echogenic liver lesion likely hemangioma, MRI with and without showing hepatic hemangioma, normal liver appearance normal spleen and normal bile ducts possible thickening of the stomach was noted.  He underwent CT scan which showed hepatomegaly but normal level appearance with a normal portal vein and again possible thickening of the stomach.  He had an echocardiogram showed an EF of 65%, diastolic dysfunction, possible dynamic LVOT, normal RV size and function.  He underwent prior paracentesis with cytology only showing reactive cells but no definitive malignancy.  Patient has no history of alcohol or other drug use he denies any known significant exposures    On arrival here he had a BP of 175/104 and a heart rate of 80  remarkable labs include hemoglobin of 20.1, hematocrit of 60.1, troponin of 77 sodium of 132, glucose 117, BUN 51, calcium 11.6, AST over /60, alk phos of 464, total bili of 24.8, albumin 3.1.         Interval Problem Update  Patient seen and examined at bedside, ill appearing, denies specific complaints, feels very fatigued. Has very little energy,  difficulty even getting up to bathroom due to weakness   EKG, diffuse gastritis, biopsies taken   NPO at midnight for IR liver biopsy tomorrow   Holding DVT prophylaxis   Discussed case with hem/onc, recommend biopsy, re-consult pending Biopsy results   Na down today, increasing BUN/Cr and increasing liver enzymes   GI following       Consultants/Specialty  Gastroenterology-GI consultants    Code Status  Full Code    Disposition  TBD    Review of Systems  Review of Systems   Constitutional: Positive for malaise/fatigue. Negative for chills and fever.   HENT: Negative for congestion and sore throat.    Eyes: Negative for blurred vision and double vision.   Respiratory: Positive for shortness of breath. Negative for cough.    Cardiovascular: Positive for leg swelling. Negative for chest pain and palpitations.   Gastrointestinal: Positive for diarrhea and nausea. Negative for abdominal pain, blood in stool, constipation, melena and vomiting.   Genitourinary: Negative for dysuria.   Musculoskeletal: Positive for back pain.   Neurological: Positive for weakness. Negative for dizziness, sensory change, speech change, focal weakness and headaches.   Psychiatric/Behavioral: The patient is nervous/anxious.         Physical Exam  Temp:  [35.7 °C (96.2 °F)-36.6 °C (97.8 °F)] 35.8 °C (96.5 °F)  Pulse:  [71-92] 81  Resp:  [14-22] 15  BP: (100-173)/() 122/87  SpO2:  [90 %-99 %] 93 %    Physical Exam  Vitals and nursing note reviewed.   Constitutional:       General: He is not in acute distress.     Appearance: He is normal weight. He is ill-appearing and toxic-appearing.   HENT:      Head: Normocephalic and atraumatic.      Mouth/Throat:      Mouth: Mucous membranes are dry.      Pharynx: Oropharynx is clear.   Eyes:      General: Scleral icterus present.      Extraocular Movements: Extraocular movements intact.      Pupils: Pupils are equal, round, and reactive to light.   Cardiovascular:      Rate and Rhythm: Normal rate  and regular rhythm.      Pulses: Normal pulses.      Heart sounds: No murmur.   Pulmonary:      Effort: Pulmonary effort is normal.      Comments: Diminished in bases  Abdominal:      General: There is distension.      Palpations: Abdomen is soft.      Tenderness: There is no abdominal tenderness. There is no guarding or rebound.   Musculoskeletal:      Cervical back: Normal range of motion.      Right lower leg: Edema present.      Left lower leg: Edema present.      Comments: 2+ pitting edema bilaterally up to knees   Skin:     General: Skin is dry.      Capillary Refill: Capillary refill takes 2 to 3 seconds.      Coloration: Skin is jaundiced.      Findings: Bruising present. No rash.      Comments: Excoriations on hands bilaterally   Neurological:      General: No focal deficit present.      Mental Status: He is alert and oriented to person, place, and time.   Psychiatric:         Mood and Affect: Mood normal.         Behavior: Behavior normal.         Fluids    Intake/Output Summary (Last 24 hours) at 4/15/2021 1805  Last data filed at 4/15/2021 0921  Gross per 24 hour   Intake 500 ml   Output --   Net 500 ml       Laboratory  Recent Labs     04/13/21 2035 04/14/21 0229   WBC 10.1 10.5   RBC 6.40* 4.68*   HEMOGLOBIN 22.1* 16.6   HEMATOCRIT 60.1* 44.8   MCV 93.9 95.7   MCH 35.0* 35.5*   MCHC 37.3* 37.1*   RDW 68.8* 71.3*   PLATELETCT 169 109*     Recent Labs     04/13/21  2035 04/14/21  0229 04/15/21  0627   SODIUM 132* 133* 129*   POTASSIUM 5.1 5.0 4.9   CHLORIDE 92* 97 96   CO2 22 17* 18*   GLUCOSE 117* 107* 100*   BUN 51* 52* 72*   CREATININE 0.86 0.78 0.37*   CALCIUM 11.6* 10.7* 10.9*     Recent Labs     04/15/21  1336   INR 1.72*         Recent Labs     04/14/21 0229   TRIGLYCERIDE 123   HDL 12*   *       Imaging  US-PARACENTESIS, ABD WITH IMAGING   Final Result      1. Ultrasound-guided therapeutic and diagnostic paracentesis of the right lower quadrant of the abdominal wall.      2. 2050 mL of  fluid withdrawn.      CT-ABDOMEN-PELVIS WITH   Final Result         1.  Enhancement of the gastric mucosa, consider gastritis.   2.  Scattered large quantity of abdominal ascites   3.  Hepatomegaly and diffuse hepatic steatosis   4.  Diverticulosis   5.  Atherosclerosis and atherosclerotic coronary artery disease.      [id number]    (Results Pending)        Assessment/Plan  Ascites- (present on admission)  Assessment & Plan  Presented with increasing abdominal distention and ascites, status post paracentesis done approximately 2 weeks ago   CT abd showing: Enhancement of the gastric mucosa, consider gastritis., Scattered large quantity of abdominal ascites, Hepatomegaly and diffuse hepatic steatosis  Unclear etiology at this time  Pending liver biopsy, planned for 4/16  Repeat paracentesis done 4/15 with path pending   GI consulted, appreciate their help with management and recommendations  EGD today, gastric biopsies done, follow up on path   Continue Lasix and spironolactone    Hyperbilirubinemia- (present on admission)  Assessment & Plan  Progressive worsening of bilirubin , bili >20  Unclear etiology at this time, has had extensive work-up with gastroenterology including a relatively negative MRI and CT scan without evidence of liver abnormality or biliary dilation/obstruction or abnormality   Autoimmune liver disease workup thus far negative   Unlikely wilsons given no neurological manifestations however will check serum copper , pending   Iron WNL  Elevated LDH, normal haptoglobin, erythrocytosis   GI following appreciate their help with management and recommendations  Pending gastric and liver biopsy   Discussed case with heme/onc, recommending to consult once biopsies are completed       Hypercalcemia- (present on admission)  Assessment & Plan  Corrected calcium increasing 11.7-->12.1, currently appears to be asymptomatic  Possible hypercalcemia of malignancy ?  No bony lesions or osteoblastic  lesions noted on imaging thus far  Holding IV fluids due to ascites requiring diuresis  We will continue to closely monitor, if it continues to increase we will consider IV fluids versus zoledronic acid vs calcitonin         Hyponatremia- (present on admission)  Assessment & Plan  Na 129, suspect this is due to his liver disease   Held IVF due to ascites  Continue lasix/spironolactone   Continue to monitor    Elevated troponin- (present on admission)  Assessment & Plan  Troponin 77 on arrival, repeat elevated but consistent with initial  Echo 04/13 reviewed showing EF 65%  EKG - sinus rhythm, probable inferior infarct     Transaminitis- (present on admission)  Assessment & Plan  Ast/ALT continues to increase, unclear etiology   Denies alochol use, drug use or significant exposure, hep panel negative   Autoimmune workup thus far negative, imaging not diagnostic   Viral etiologies like EBV, HSV, pending   SPEP pending   Pending LIver biopsy and measurement of transjugular gradient 4/16  GI following, appreciate help with management   Cont. To monitor closely     HTN (hypertension)- (present on admission)  Assessment & Plan  BP on arrival 175/104 HR 80  Clonidine prn  Enalapril prn  Continue Lasix and spironolactone  Now improved        VTE prophylaxis: Lovenox

## 2021-04-16 NOTE — PROGRESS NOTES
Patient became very lethargic following liver biopsy, RN notified Dr. Darnell of the change in condition who came to the bedside to assess patient and MD ordered narcan to be given to the patient. Patient was also placed on oxygen due to low sat.  Patient became more responsive about an hour after narcan was given.  Dr. Darnell as well as GI doctor spoke with the patient's wife at bedside about the patient's condition.

## 2021-04-16 NOTE — THERAPY
Contact Note    Patient Name: Adithya Cohn  Age:  80 y.o., Sex:  male  Medical Record #: [id number]  Today's Date: 4/16/2021    Attempted tx earlier in day and noted pt at liver biopsy; post procedure pt had change in status with notable lethargy/confusion and will hold for now; would assess appropriateness prior to resumption of PT services given change in status per chart/nsg

## 2021-04-16 NOTE — CONSULTS
DATE OF SERVICE:  04/16/2021     CONSULTATION CALLED BY:  Idalia Darnell MD     REASON FOR CONSULTATION:  1.  Elevated bilirubin.  2.  Increased hemoglobin and hematocrit.     HISTORY OF PRESENT ILLNESS:  The patient is a 80-year-old gentleman who I was   asked to see, who has been in the hospital for a few days.  Review of his   previous labs done at [facility] over the past few months has revealed that he has   had abnormal liver function tests since 03/2021.  His calcium was elevated   and alkaline phosphatase and AST, ALT have been significantly elevated at   least since 02/2021.  Subsequently, his bilirubin increased to over 20 and he   was admitted to the hospital.  GI has already seen the patient and a   transjugular biopsy of his liver has already been performed.  Otherwise, the   patient is not able to give me any history.  There is no history of weight   loss.  He did have some back surgery last year.  There is no history of any   alcohol abuse.  I am not sure if he has lost any significant amount of weight   in the recent past.  His primary care physician is Dr. Rikki Palomino.   Because of his worsening condition, he was admitted to the hospital on   04/13/2021.  Otherwise, the patient has a history of hypertension, anxiety,   and dyslipidemia and was admitted for abdominal distention.  He was seen by   Dr. Mcmillan at that time and was advised to get admitted to the hospital.  He   also had a paracentesis in early 04/2021.     PAST MEDICAL HISTORY:  Hypertension, arthritis, back pain, GERD, dyslipidemia,   hypertension, and urinary incontinence.     PAST SURGICAL HISTORY:  Right hip replacement, knee surgery, TURP, orthopedic   surgery.     FAMILY HISTORY:  Unable to obtain.     PERSONAL AND SOCIAL HISTORY:  .  No history of alcohol abuse.    Ex-smoker.     REVIEW OF SYSTEMS:  Could not be completed since the patient is not able to   answer my questions.     PHYSICAL EXAMINATION:  GENERAL:  On  examination, the patient is lethargic.  He is able to follow very   occasional commands.  VITAL SIGNS:  Review of his labs shows a temperature of 35.6, pulse 83,   respiration rate 18, /91.  HEENT:  Pupils are equal.  He is deeply jaundiced.  Oral mucosa reveals tongue   is coated.  NECK:  Reveals no lymphadenopathy.  There is no JVD.  LUNGS:  Clear to auscultation with good bilateral air entry.  HEART:  Reveals no S3 or S4.  ABDOMEN:  Reveals no hepatosplenomegaly.  There is possibly some ascites.  EXTREMITIES:  There is trace bilateral lower extremity edema.  There is no   calf tenderness.  SKIN:  Dry and has a yellowish hue.  There is no rash.  NEUROLOGIC:  Could not be completed.  PSYCHIATRIC:  Evaluation could not be completed.     RADIOLOGICAL STUDIES:  Reviewed.     LABORATORY DATA:  Reviewed.     ASSESSMENT AND PLAN:  Jaundice, etiology of this is not very clear to me at   this time.  A transjugular biopsy has been performed.  We will follow up that   biopsy.  The concern was raised that the patient may have a   lymphoproliferative disorder.  At this time, I have no proof of a   lymphoproliferative disorder and need the results of her biopsy before   proceeding ahead.  The patient's haptoglobin was low.  His LDH has been high.    There is no evidence of any ongoing hemolysis, but I will check out his   reticulocyte count.  Hemoglobin and hematocrit has varied significantly.    Prior to this, his hemoglobin and hematocrit was fairly unremarkable.  On   03/23, his hemoglobin was 18.9 and hematocrit was 53.9.  On 04/14, his   hemoglobin and hematocrit was 16.6/44.8, then increased. Again, this might be   related to the use of diuretics.  I will check out his reticulocyte count.  I   will also get a JAK2 mutational testing done on him.  I will also get a Rosendo   test done on him as well.  ?Tumor lysis, there is a possibility that the   patient might be developing tumor lysis.  I have discussed this case  with Dr. Darnell, who will be calling a nephrology consultation on him.  The patient   is critically sick and at this time, we will need some kind of biopsy   confirmation of his diagnosis.  If needed, we will get a bone marrow biopsy   done on him.        ______________________________  MD SIXTO Zamora/BONY/MICHAEL    DD:  04/16/2021 12:58  DT:  04/16/2021 13:55    Job#:  [id number]

## 2021-04-16 NOTE — PROGRESS NOTES
Gastroenterology Progress Note     Author: Gokul Jaimes M.D.   Date & Time Created: 4/16/2021 3:29 PM    Chief Complaint:  Jaundice, ascites    History of Presenting Illness  80 y.o. male with HTN, hyperlipidemia, back pain who presented 4/13/2021 with progressive weakness, edema, bloating.     He has been followed by Dr. Mcmillan as outpatient over the past 2 months for elevated liver tests, jaundice, ascites.  Has had extensive evaluation.     He initially developed some abdominal bloating following back surgery 9/2020.  He was started on tizanidine and it was felt this may be contributing so this was stopped around 12/2020.  Denies other new medications, herbal supplements, OTC meds.  He remembers having fevers around time of back surgery but no other symptoms.     Then, in February, his symptom of bloating was increasing.  He had initial labs 2/2021 showing t bili 3.2, , , alk phos 551.  Labs summer 2020 with normal liver tests.  Since then, his LFTs have continued to rise.  Labs now show t bili 20.7, alk phos 353, AST 90, ALT 51, INR 1.4.  He has had intermittent erythrocytosis with elevated Hgb as well as mild hypercalcemia as well.     Prior evaluation has included the following.  Acute viral hepatitis panel negative. , ferritin 165, iron sat 29%, JOSE negative, Actin Ab negative, LKM Ab negative, IgG low at 625, AMA negative, anti sp 100 neg, anti gp 210 negative, alk phos isoenzymes with high liver and bone fractions, A1AT nl, TSh nl, celiac negative, LDH high at 395, haptoglobin low <10, folate nl, B12 high, Vitamin A slightly low, CA 19-9 nl, AFP nl, PSA nl, TB quantiferon negative, PTH normal, lipase normal.  Abdominal US with normal liver with echogenic liver lesion likely hemangioma, ascites, normal spleen.  MRI abdomen with and without shows hepatic hemangioma, normal liver appearance, normal spleen, normal bile ducts.  Possible thickening stomach.  CT scan shows hepatomegaly  but normal liver appearance and normal portal vein.  Again possible thickening of stomach.  Echocardiogram shows EF 65%, diastolic dysfunction, possible dynamic LVOT, normal RV size and function.  Prior paracentesis with cytology only showing some reactive cells but no definite malignancy.     He denies alcohol or other drug use.  Denies any exposure to farm animals, ticks, other sick contacts.     Interval History:  4/15/2021: Still abdominal discomfort.  Some diarrhea with scant melena.  Very weak and tired.  Labs with rising BUN, calcium and bilirubin.  Paracentesis yesterday without SBP but consistent with portal HTN.  Other labs reuested do not appear to have been drawn.    4/16/2021: Confused after getting sedation for liver biopsy.  Possible abdominal pain.  Labs with rising BUN, calcium, liver tests, INR.  Clinically worsening.  Had transjugular liver biopsy today.    Review of Systems:  Review of Systems   Constitutional: Positive for malaise/fatigue and weight loss. Negative for chills and fever.   Respiratory: Positive for shortness of breath.    Cardiovascular: Positive for leg swelling.   Gastrointestinal: Positive for abdominal pain, diarrhea and nausea. Negative for blood in stool, constipation, heartburn, melena and vomiting.   Neurological: Positive for weakness.       Physical Exam:  Physical Exam  Vitals and nursing note reviewed.   Constitutional:       General: He is not in acute distress.     Appearance: He is ill-appearing.   Eyes:      General: Scleral icterus present.   Cardiovascular:      Rate and Rhythm: Normal rate and regular rhythm.      Pulses: Normal pulses.      Heart sounds: Normal heart sounds.   Pulmonary:      Breath sounds: Decreased breath sounds present.   Abdominal:      General: Abdomen is flat. Bowel sounds are normal. There is distension.      Palpations: Abdomen is soft. There is no mass.      Tenderness: There is no abdominal tenderness. There is no guarding or rebound.       Hernia: No hernia is present.   Skin:     Coloration: Skin is jaundiced.   Neurological:      Mental Status: He is alert. He is disoriented.         Labs:          Recent Labs     04/13/21  2035 04/13/21  2035 04/14/21  0229 04/15/21  0627 04/16/21  0353   SODIUM 132*   < > 133* 129* 132*   POTASSIUM 5.1   < > 5.0 4.9 4.6   CHLORIDE 92*   < > 97 96 96   CO2 22   < > 17* 18* 16*   BUN 51*   < > 52* 72* 74*   CREATININE 0.86   < > 0.78 0.37* 0.81   MAGNESIUM 2.3  --   --   --   --    PHOSPHORUS  --   --   --   --  5.4*   CALCIUM 11.6*   < > 10.7* 10.9* 11.0*    < > = values in this interval not displayed.     Recent Labs     04/13/21  2035 04/13/21  2035 04/14/21  0229 04/15/21  0627 04/16/21  0353   ALTSGPT 60*   < > 51* 58* 59*   ASTSGOT 106*   < > 90* 116* 110*   ALKPHOSPHAT 464*   < > 353* 354* 328*   TBILIRUBIN 24.8*   < > 20.7* 23.1* 25.5*   LIPASE 25  --   --   --   --    GLUCOSE 117*   < > 107* 100* 115*    < > = values in this interval not displayed.     Recent Labs     04/14/21  0229 04/15/21  1336 04/15/21  1716 04/15/21  1911 04/16/21  0353   RBC   < >  --  6.20* 6.25* 5.78   HEMOGLOBIN   < >  --  22.0* 22.0* 20.4*   HEMATOCRIT   < >  --  59.0* 59.5* 55.5*   PLATELETCT   < >  --  100* 100* 76*   PROTHROMBTM  --  20.7*  --   --   --    INR  --  1.72*  --   --   --     < > = values in this interval not displayed.     Recent Labs     04/14/21  0229 04/14/21  0229 04/15/21  0627 04/15/21  1716 04/15/21  1911 04/16/21  0353   WBC 10.5   < >  --  10.3 10.5 11.5*   NEUTSPOLYS 82.90*  --   --  86.20*  --  84.80*   LYMPHOCYTES 6.00*  --   --  5.50*  --  4.20*   MONOCYTES 10.20  --   --  7.60  --  7.60   EOSINOPHILS 0.00  --   --  0.00  --  3.40   BASOPHILS 0.30  --   --  0.20  --  0.00   ASTSGOT 90*  --  116*  --   --  110*   ALTSGPT 51*  --  58*  --   --  59*   ALKPHOSPHAT 353*  --  354*  --   --  328*   TBILIRUBIN 20.7*  --  23.1*  --   --  25.5*    < > = values in this interval not displayed.      Hemodynamics:  Temp (24hrs), Av.8 °C (96.4 °F), Min:35.6 °C (96.1 °F), Max:35.9 °C (96.7 °F)  Temperature: (Abnormal) 35.6 °C (96.1 °F)  Pulse  Av.1  Min: 65  Max: 117   Blood Pressure : 116/91  CVP (mm Hg): (Abnormal) -1 MM HG  Respiratory:    Respiration: 18, Pulse Oximetry: 100 %           Fluids:    Intake/Output Summary (Last 24 hours) at 4/15/2021 0910  Last data filed at 2021 1800  Gross per 24 hour   Intake 240 ml   Output 2450 ml   Net -2210 ml     Weight: 79.8 kg (175 lb 14.8 oz)  GI/Nutrition:  Orders Placed This Encounter   Procedures   • Diet Order Diet: Clear Liquid     Standing Status:   Standing     Number of Occurrences:   1     Order Specific Question:   Diet:     Answer:   Clear Liquid [10]     Medical Decision Making, by Problem:  Active Hospital Problems    Diagnosis    • Hyperbilirubinemia [E80.6]    • Ascites [R18.8]    • HTN (hypertension) [I10]    • Transaminitis [R74.01]    • Elevated troponin [R77.8]    • Hyponatremia [E87.1]    • Hypercalcemia [E83.52]      EGD Findings (4/15/2021):  1. Diffuse thickening and erythema with scattered small heme and friability to gastric mucosa.  Biopsies obtained.  2. Diffuse edema and erythema with flattening of villi in duodenum.  Biopsies obtained.    Assessment/Plan  81 y/o with HTN, hyperlipidemia, history of prostate cancer with progressive jaundice, ascites with associated erythrocytosis and hypercalcemia of unclear etiology.  Has had extensive evaluation as outpatient without clear cause determined.  Still question paraneoplastic process causing cholestatic hepatitis related to undiagnosed malignancy (Stauffers syndrome) versus DILI versus autoimmune.  Also consider amyloid or intestinal lymphoma given diffuse thickening of stomach and duodenum on endoscopy.  Slowly worsening.     PROBLEMS:  1. Jaundice  2. Elevated liver tests  3. Erythrocytosis  4. Hypercalcemia  5. Ascites  6. Generalized weakness  7. Hyponatremia  8.  Azotemia  9. Protein-calorie malnutrition, moderate  10. Hypertension  11. Hyperlipidemia  12. History of prostate cancer    Plan:  1. Had lengthy discussion with wife regarding deteriorating status and still unclear as to cause.  2. Awaiting gastric, duodenal and liver biopsy results  3. Start 60 mg IV Solumedrol for potential autoimmune mediated liver process given worsening clinical status  4. Hematology consultation  5. Awaiting additional labs  6. Daily labs including CBC, CMP, INR  7. Continue lactulose and Xifaxan     Quality-Core Measures   Reviewed items::  Medications reviewed, Labs reviewed and Radiology images reviewed

## 2021-04-16 NOTE — PROGRESS NOTES
Assumed care of PT A&O 4. Pt resting in bed with no signs of labored breathing and having some pain in lower back. On RA. Tele monitor in place, cardiac rhythm being monitored. Call light within reach, bed in lowest position, upper bed rails up. Pt was updated on plan of care for the day which includes a liver biopsy this morning .

## 2021-04-16 NOTE — CONSULTS
"[facility] -  CONSULTATION NOTE               Author: Buddy Michel M.D. Date & Time: 4/16/2021  11:19 AM       REASON FOR CONSULTATION:   - Electrolyte abnormalities    CHIEF COMPLAINT:   -  \" Abdominal swelling, jaundice \"    HISTORY OF PRESENT ILLNESS:    Patient is a 80 year old male with a PMHx of HTN, HLD, anxiety who presented on 4/13/21 with abdominal pain with progressive jaundice.  Seen by GI in the past for elevated LFTs and ascities but work up with hepatitis panel, and autoimmune including JOSE, actib Ab has been negative.  Abdominal ultrasound of liver with echogenic liver lesion thought to be hemangioma.  In the ER found to have HTN with sodium 132, elevated LFTs and tbili of 24.8.  Calcium 11.6.  Nephrology consulted for possible TLS.    Patient currently confused.    REVIEW OF SYSTEMS:    Unable due to acuity    PAST MEDICAL HISTORY:   Past Medical History:   Diagnosis Date   • Arthritis     general body   • Cancer (HCC)     prostate 2005   • Heart burn     on prilosec   • High cholesterol    • Hypertension    • Urinary incontinence          PAST SURGICAL HISTORY:   Past Surgical History:   Procedure Laterality Date   • PB UPPER GI ENDOSCOPY,DIAGNOSIS N/A 4/15/2021    Procedure: GASTROSCOPY;  Surgeon: Gokul Jaimes M.D.;  Location: SURGERY SAME DAY [facility];  Service: Gastroenterology   • PB UPPER GI ENDOSCOPY,BIOPSY  4/15/2021    Procedure: GASTROSCOPY, WITH BIOPSY;  Surgeon: Gokul Jaimes M.D.;  Location: SURGERY SAME DAY [facility];  Service: Gastroenterology   • THORACIC FUSION O-ARM  9/11/2020    Procedure: Thoracic 10-Lumbar 1 Posterior Lateral fusion Transpedicular approach,  Thoracic 11-12 Discectomy , Thoracic 11-12  laminectomy , with O- arm , with somatosensory Evoked Potential and Electromyography monitoring;  Surgeon: Eliceo Armenta M.D.;  Location: SURGERY [facility];  Service: Neurosurgery   • HIP ARTHROPLASTY TOTAL Left 6/12/2017    Procedure: " HIP ARTHROPLASTY TOTAL;  Surgeon: Shay Lafleur M.D.;  Location: SURGERY [facility];  Service:    • HIP ARTHROPLASTY TOTAL Right 2014   • KNEE ARTHROPLASTY TOTAL Left 2014   • TRANS URETHRAL RESECTION PROSTATE  2005   • OTHER      removal of melanoma  (nasal)          FAMILY HISTORY:   - Reviewed and non contributory to current illness    SOCIAL HISTORY:   Social History     Socioeconomic History   • Marital status:      Spouse name: Not on file   • Number of children: Not on file   • Years of education: Not on file   • Highest education level: Not on file   Occupational History   • Not on file   Tobacco Use   • Smoking status: Former Smoker   • Smokeless tobacco: Never Used   Substance and Sexual Activity   • Alcohol use: Not Currently   • Drug use: No   • Sexual activity: Not on file   Other Topics Concern   • Not on file   Social History Narrative   • Not on file     Social Determinants of Health     Financial Resource Strain:    • Difficulty of Paying Living Expenses:    Food Insecurity:    • Worried About Running Out of Food in the Last Year:    • Ran Out of Food in the Last Year:    Transportation Needs:    • Lack of Transportation (Medical):    • Lack of Transportation (Non-Medical):    Physical Activity:    • Days of Exercise per Week:    • Minutes of Exercise per Session:    Stress:    • Feeling of Stress :    Social Connections:    • Frequency of Communication with Friends and Family:    • Frequency of Social Gatherings with Friends and Family:    • Attends [employer] Services:    • Active Member of Clubs or Organizations:    • Attends Club or Organization Meetings:    • Marital Status:    Intimate Partner Violence:    • Fear of Current or Ex-Partner:    • Emotionally Abused:    • Physically Abused:    • Sexually Abused:          HOME MEDICATIONS:   - Reviewed and documented in chart    LABORATORY STUDIES:   - Reviewed and documented in chart    ALLERGIES:  Patient has no known  allergies.    VS:  /76   Pulse 77   Temp (!) 35.7 °C (96.3 °F) (Temporal)   Resp 17   Ht 1.829 m (6')   Wt 79.8 kg (175 lb 14.8 oz)   SpO2 96%   BMI 23.86 kg/m²   Physical Exam   Constitutional: No distress.   HENT:   Head: Normocephalic.   Eyes: Pupils are equal, round, and reactive to light.   Cardiovascular: Normal rate.   Pulmonary/Chest: Effort normal.   Abdominal: Soft. He exhibits distension.   Musculoskeletal:         General: Edema present.      Cervical back: Normal range of motion.   Neurological:   confused   Skin: Skin is warm.       FLUID BALANCE:  In: 500 [I.V.:500]  Out: -     LABS:  Recent Labs     04/14/21  0229 04/15/21  0627 04/16/21  0353   SODIUM 133* 129* 132*   POTASSIUM 5.0 4.9 4.6   CHLORIDE 97 96 96   CO2 17* 18* 16*   GLUCOSE 107* 100* 115*   BUN 52* 72* 74*   CREATININE 0.78 0.37* 0.81   CALCIUM 10.7* 10.9* 11.0*        IMAGING:  - Imaging studies reviewed by me    IMPRESSION:  # Elevated Uric Acid   - Unclear if this is TLS but patient has elevated phos and uric acid, but calcium is also elevated   - Start allopurinol  # Hypercalcemia   - Corrected calcium is about 12.2   - Continue gentle IVF hydration for now   - Sent for SPEP  # Ascites  # Hyperbilirubinemia   - S/p Liver biopsy  # Elevated troponin  # Transaminitis  # HTN   - Currently BP has been lower   - Hold spironolactone for now   - Consider holding lasix if trending lower    PLAN:  - Start allopurinol 100mg PO Q8H  - Agree with gentle IVF for now given hypercalcemia  - Hold spironolactone given lower Bps  - Continue lasix  - Follow up liver biopsy  - Send for SPEP  - Daily evaluation for RRT needs  - Dose all meds per eGFR < 15    Thank you for the consultation!

## 2021-04-16 NOTE — CARE PLAN
Problem: Communication  Goal: The ability to communicate needs accurately and effectively will improve  Outcome: PROGRESSING AS EXPECTED  Intervention: Reorient patient to environment as needed  Flowsheets (Taken 4/15/2021 1515 by Idalia Vieira, R.N.)  Oriented to:: All of the Following : Location of Bathroom, Visiting Policy, Unit Routine, Call Light and Bedside Controls, Bedside Rail Policy, Smoking Policy, Rights and Responsibilities, Bedside Report, and Patient Education Notebook  Note: Patient is quite lethargic at baseline and needs reminders about the current situation     Problem: Pain Management  Goal: Pain level will decrease to patient's comfort goal  Outcome: PROGRESSING AS EXPECTED

## 2021-04-16 NOTE — CARE PLAN
Problem: Nutritional:  Goal: Achieve adequate nutritional intake  Description: Patient will consume >50% of meals  Outcome: PROGRESSING SLOWER THAN EXPECTED     Attempted visit, pt with AMS (unable to answer questions). Clear liquid diet today. RD will continue to monitor for diet advancement and adequate nutrition intake.

## 2021-04-16 NOTE — PROGRESS NOTES
[facility] Medicine Daily Progress Note    Date of Service  4/16/2021    Chief Complaint  80 y.o. male admitted 4/13/2021 with abdominal swelling and jaundice that has been progressive over the last several weeks, patient was sent by his physician to ER for further evaluation    Hospital Course  This is a very pleasant 80-year-old gentleman with a past medical history of hypertension, hyperlipidemia and anxiety who presented on 4/13/2021 for evaluation of abdominal swelling and progressive jaundice.  Patient has been followed by gastroenterology Dr. Mcmillan as outpatient for the last 2 months for elevated liver test and a development of ascites.  He has had extensive evaluation including a negative acute viral hepatitis panel, negative autoimmune work-up including JOSE actin AB, anti-sp, and anti-GP.  Abdominal ultrasound with normal liver and echogenic liver lesion likely hemangioma, MRI with and without showing hepatic hemangioma, normal liver appearance normal spleen and normal bile ducts possible thickening of the stomach was noted.  He underwent CT scan which showed hepatomegaly but normal level appearance with a normal portal vein and again possible thickening of the stomach.  He had an echocardiogram showed an EF of 65%, diastolic dysfunction, possible dynamic LVOT, normal RV size and function.  He underwent prior paracentesis with cytology only showing reactive cells but no definitive malignancy.  Patient has no history of alcohol or other drug use he denies any known significant exposures    On arrival here he had a BP of 175/104 and a heart rate of 80  remarkable labs include hemoglobin of 20.1, hematocrit of 60.1, troponin of 77 sodium of 132, glucose 117, BUN 51, calcium 11.6, AST over /60, alk phos of 464, total bili of 24.8, albumin 3.1.         Interval Problem Update  Patient seen and examined at bedside, very ill appearing, lethargic, difficult to arouse with shallow and decreased respirations  status post transjugular liver biopsy  -Patient was hypoxic and mildly hypotensive, 1 dose of IV 0.4 mg Narcan with improvement, continuing to monitor very closely  -Status post liver and gastric biopsy, follow-up path  -Hematology oncology consulted, appreciate their help with management and recommendations  -Elevated uric acid and Phos, possible TLS?,  Nephrology consulted, appreciate their help with management and recommendations  -IV fluids initiated, allopurinol started, spironolactone discontinued  -Advance diet as tolerated  -GI continues to follow, appreciate their help with management      Consultants/Specialty  Gastroenterology-GI consultants    Code Status  Full Code    Disposition  TBD    Review of Systems  Review of Systems   Constitutional: Positive for malaise/fatigue. Negative for chills and fever.   HENT: Negative for congestion and sore throat.    Eyes: Negative for blurred vision and double vision.   Respiratory: Positive for shortness of breath. Negative for cough.    Cardiovascular: Positive for leg swelling. Negative for chest pain and palpitations.   Gastrointestinal: Positive for diarrhea and nausea. Negative for abdominal pain, blood in stool, constipation, melena and vomiting.   Genitourinary: Negative for dysuria.   Musculoskeletal: Positive for back pain.   Neurological: Positive for weakness. Negative for dizziness, sensory change, speech change, focal weakness and headaches.   Psychiatric/Behavioral: The patient is nervous/anxious.         Physical Exam  Temp:  [35.6 °C (96.1 °F)-35.9 °C (96.7 °F)] 35.6 °C (96.1 °F)  Pulse:  [74-86] 83  Resp:  [10-18] 18  BP: ()/(64-94) 116/91  SpO2:  [85 %-100 %] 100 %    Physical Exam  Vitals and nursing note reviewed.   Constitutional:       General: He is not in acute distress.     Appearance: He is normal weight. He is ill-appearing and toxic-appearing.   HENT:      Head: Normocephalic and atraumatic.      Mouth/Throat:      Mouth: Mucous  membranes are dry.      Pharynx: Oropharynx is clear.   Eyes:      General: Scleral icterus present.      Extraocular Movements: Extraocular movements intact.      Pupils: Pupils are equal, round, and reactive to light.   Cardiovascular:      Rate and Rhythm: Normal rate and regular rhythm.      Pulses: Normal pulses.      Heart sounds: No murmur.   Pulmonary:      Effort: Pulmonary effort is normal.      Comments: Diminished in bases  Abdominal:      General: There is distension.      Palpations: Abdomen is soft.      Tenderness: There is no abdominal tenderness. There is no guarding or rebound.   Musculoskeletal:      Cervical back: Normal range of motion.      Right lower leg: Edema present.      Left lower leg: Edema present.      Comments: 2+ pitting edema bilaterally up to knees   Skin:     General: Skin is dry.      Capillary Refill: Capillary refill takes 2 to 3 seconds.      Coloration: Skin is jaundiced.      Findings: Bruising present. No rash.      Comments: Excoriations on hands bilaterally   Neurological:      General: No focal deficit present.      Mental Status: He is alert and oriented to person, place, and time.   Psychiatric:         Mood and Affect: Mood normal.      Comments: Lethargic       Physical exam unchanged from 4/15/2021    Fluids  No intake or output data in the 24 hours ending 04/16/21 1405    Laboratory  Recent Labs     04/15/21  1716 04/15/21  1911 04/16/21  0353   WBC 10.3 10.5 11.5*   RBC 6.20* 6.25* 5.78   HEMOGLOBIN 22.0* 22.0* 20.4*   HEMATOCRIT 59.0* 59.5* 55.5*   MCV 95.2 95.2 96.0   MCH 35.5* 35.2* 35.3*   MCHC 37.3* 37.0* 36.8*   RDW 66.3* 67.4* 65.9*   PLATELETCT 100* 100* 76*   MPV  --  14.2*  --      Recent Labs     04/14/21  0229 04/15/21  0627 04/16/21  0353   SODIUM 133* 129* 132*   POTASSIUM 5.0 4.9 4.6   CHLORIDE 97 96 96   CO2 17* 18* 16*   GLUCOSE 107* 100* 115*   BUN 52* 72* 74*   CREATININE 0.78 0.37* 0.81   CALCIUM 10.7* 10.9* 11.0*     Recent Labs      04/15/21  1336   INR 1.72*         Recent Labs     04/14/21  0229   TRIGLYCERIDE 123   HDL 12*   *       Imaging  [id number]   Final Result            1. Successful transjugular liver biopsy.      2. The corrected wedged hepatic venous pressure was 14 mmHg consistent with portal hypertension.      US-PARACENTESIS, ABD WITH IMAGING   Final Result      1. Ultrasound-guided therapeutic and diagnostic paracentesis of the right lower quadrant of the abdominal wall.      2. 2050 mL of fluid withdrawn.      CT-ABDOMEN-PELVIS WITH   Final Result         1.  Enhancement of the gastric mucosa, consider gastritis.   2.  Scattered large quantity of abdominal ascites   3.  Hepatomegaly and diffuse hepatic steatosis   4.  Diverticulosis   5.  Atherosclerosis and atherosclerotic coronary artery disease.           Assessment/Plan  Ascites- (present on admission)  Assessment & Plan  Presented with increasing abdominal distention and ascites, status post paracentesis done approximately 2 weeks ago   CT abd showing: Enhancement of the gastric mucosa, consider gastritis., Scattered large quantity of abdominal ascites, Hepatomegaly and diffuse hepatic steatosis  Unclear etiology at this time, suspect liver disease may be secondary to underlying neoplastic vs lymphoproliferative disorder   Pending liver biopsy pathology   Repeat paracentesis done 4/15 with path pending, no signs of infection etiology, no overt malignant cells  SAAG score >1.1 suggesting portal hypertension is likely cause of ascites   GI following, appreciate their help with management and recommendations  EGD 4/14, gastric biopsies done, follow up on path   Continue Lasix, discontinue aldactone due to soft pressures     Hyperbilirubinemia- (present on admission)  Assessment & Plan  Progressive worsening of bilirubin , bili >24 today   Unclear etiology at this time, has had extensive work-up with gastroenterology including a negative MRI and CT scan  without evidence of liver abnormality or biliary dilation/obstruction or abnormality   Autoimmune liver disease workup thus far negative   Unlikely wilsons given no neurological manifestations however will check serum copper, this is pending   Iron WNL  Elevated LDH, low haptoglobin, erythrocytosis, possible hemolytic process? Rosendo pending   GI following appreciate their help with management and recommendations  Pending gastric and liver biopsy pathology results  Heme/Onc following, appreciate recommendations       Tumor lysis syndrome  Assessment & Plan  Possible TLS, elevated phos and Uric acid in setting of possible underlying malignancy   - Calcium is high which is not consistent but still with elevated Uric acid and Phos   - started on allopurinol and IVF   - continue to monitor closely   - Heme/Onc and Nephrology consulted and following, appreciate recommendations     Hypercalcemia- (present on admission)  Assessment & Plan  Corrected calcium increasing 11.7-->12.1-> 12.2  Possible hypercalcemia of malignancy ?  No bony lesions or osteoblastic lesions noted on imaging thus far  Start IVF, continue lasix as this can help remove Ca as well  We will continue to closely monitor, if it continues to increase we will consider zoledronic acid vs calcitonin     Hyponatremia- (present on admission)  Assessment & Plan  Na 132, suspect this is due to his liver disease   Now on IVF for hypercalcemia and possible TLS  Continue lasix, dc spironolactone  Continue to monitor    Elevated troponin- (present on admission)  Assessment & Plan  Troponin 77 on arrival, repeat elevated but consistent with initial  Echo 04/13 reviewed showing EF 65%  EKG - sinus rhythm, probable inferior infarct     Transaminitis- (present on admission)  Assessment & Plan  Ast/ALT continues to increase, unclear etiology. Suspect liver pathology is secondary to possible neoplastic vs lymphoproliferative disorder   Denies alochol use, drug use or  significant exposure, hep panel negative   Autoimmune workup thus far negative, imaging not diagnostic   Viral etiologies like EBV, HSV, pending   SPEP pending   Copper pending   Liver biopsy today, path pending   Started on solumedrol, monitor for worsening TLS closely   Elevated liver pressure consistent with portal hypertension   GI following, appreciate help with management   Cont. To monitor closely     HTN (hypertension)- (present on admission)  Assessment & Plan  BP on arrival 175/104 HR 80, now with soft pressures   Clonidine prn  Enalapril   Continue Lasix as BP tolerates, discontinue spironolactone       VTE prophylaxis: Lovenox       day(s)/3 days

## 2021-04-17 PROBLEM — E87.20 METABOLIC ACIDOSIS: Status: ACTIVE | Noted: 2021-01-01

## 2021-04-17 PROBLEM — D72.829 LEUKOCYTOSIS: Status: ACTIVE | Noted: 2021-01-01

## 2021-04-17 PROBLEM — D75.1 ERYTHROCYTOSIS: Status: ACTIVE | Noted: 2021-01-01

## 2021-04-17 PROBLEM — K72.01 SUBACUTE LIVER FAILURE WITH HEPATIC COMA (HCC): Status: ACTIVE | Noted: 2021-01-01

## 2021-04-17 PROBLEM — G93.40 ACUTE ENCEPHALOPATHY: Status: ACTIVE | Noted: 2021-01-01

## 2021-04-17 NOTE — CARE PLAN
Problem: Safety  Goal: Will remain free from injury  Outcome: PROGRESSING SLOWER THAN EXPECTED     Problem: Knowledge Deficit  Goal: Knowledge of disease process/condition, treatment plan, diagnostic tests, and medications will improve  Outcome: PROGRESSING AS EXPECTED

## 2021-04-17 NOTE — PROGRESS NOTES
Pt did not pass swallow eval. Hospitalist notified. Orders to hold orning po meds held and change lasix to IV. Hospitalist also notified of pt's decline in mental status throughout shift. Order given to redraw ammonia.

## 2021-04-17 NOTE — PROGRESS NOTES
[facility] Medicine Daily Progress Note    Date of Service  4/17/2021    Chief Complaint  80 y.o. male admitted 4/13/2021 with abdominal swelling and jaundice that has been progressive over the last several weeks, patient was sent by his physician to ER for further evaluation    Hospital Course  This is a very pleasant 80-year-old gentleman with a past medical history of hypertension, hyperlipidemia and anxiety who presented on 4/13/2021 for evaluation of abdominal swelling and progressive jaundice.  Patient has been followed by gastroenterology Dr. Mcmillan as outpatient for the last 2 months for elevated liver test and a development of ascites.  He has had extensive evaluation including a negative acute viral hepatitis panel, negative autoimmune work-up including JOSE actin AB, anti-sp, and anti-GP.  Abdominal ultrasound with normal liver and echogenic liver lesion likely hemangioma, MRI with and without showing hepatic hemangioma, normal liver appearance normal spleen and normal bile ducts possible thickening of the stomach was noted.  He underwent CT scan which showed hepatomegaly but normal level appearance with a normal portal vein and again possible thickening of the stomach.  He had an echocardiogram showed an EF of 65%, diastolic dysfunction, possible dynamic LVOT, normal RV size and function.  He underwent prior paracentesis with cytology only showing reactive cells but no definitive malignancy.  Patient has no history of alcohol or other drug use he denies any known significant exposures    On arrival here he had a BP of 175/104 and a heart rate of 80  remarkable labs include hemoglobin of 20.1, hematocrit of 60.1, troponin of 77 sodium of 132, glucose 117, BUN 51, calcium 11.6, AST over /60, alk phos of 464, total bili of 24.8, albumin 3.1.     Since admission patient has underwent EGD with gastric biopsy as well as transjugular liver biopsy.  These results are pending.   He has continued rapid  deterioration with worsening liver failure      Interval Problem Update  Patient seen and examined at bedside, continues to be very ill-appearing, he is alert and oriented only to self .   -Does have some agitation in which she removes oxygen, he is redirectable  -He is intermittently hypothermic however other vitals remained stable, he is requiring supplemental oxygen  -I am concerned about increasing aspiration risk with his altered mental status, patient made n.p.o. and speech evaluation ordered  -Worsening liver failure evidenced by increasing liver enzyme, increasing bilirubin now 30.4  -Gastric biopsy negative for malignancy, dysplasia or amyloid deposition  -Liver biopsy pending  -Phosphorus and calcium elevated but stable  -Continue allopurinol and IV fluids  -Hematology, nephrology and gastroenterology all following    Had a long discussion with patient's wife Franky Cohn, greater than 30 minutes was spent, discussing and educating her regarding patient's current medical status.  Discussed with with Franky that Mr. Law  Is critically ill and has a very poor prognosis and continues to rapidly decline despite our interventions.  I did discuss the concern that patient may not survive as he is having worsening liver failure.  We discussed that patient is now confused and will likely need a feeding tube for continued nutrition.  We discussed patient's CODE STATUS as he is currently a full code.  After our discussion patient's wife would like to continue the current plan initially patient was continued as Full Code.       After another discussion with wife and daughter, greater than 30 minutes was spent, Code status was readdressed and Franky, the patients wife would like to change Code status to DNR/I at this time, this is consistent with the patients daughter wishes as well.  She did verbalize understanding of his critical condition however she remains hopeful that the biopsies will provide some sort of  insight and possible treatment options.  We will continue to readdress his plan of care pending biopsies and/or changes in his medical status. May transition to comfort care and/or hospice pending clinical status and further workup.   They are okay with a temporary Cortrak at this time to help provide nutrition and medications. This was ordered.     Consultants/Specialty  Gastroenterology-GI consultants  Hematology oncology  Nephrology    Code Status  Full Code    Disposition  TBD, patient is critically ill    Review of Systems  Review of Systems   Unable to perform ROS: Mental status change   Unable to perform due to patient's altered mental status    Physical Exam  Temp:  [35.7 °C (96.3 °F)-36.2 °C (97.2 °F)] 35.7 °C (96.3 °F)  Pulse:  [74-96] 89  Resp:  [16-19] 19  BP: (117-148)/(74-96) 132/85  SpO2:  [91 %-96 %] 91 %    Physical Exam  Vitals and nursing note reviewed.   Constitutional:       General: He is not in acute distress.     Appearance: He is normal weight. He is ill-appearing and toxic-appearing.      Comments: Lethargic, alert and oriented x 1, restless   HENT:      Head: Normocephalic and atraumatic.      Mouth/Throat:      Mouth: Mucous membranes are dry.      Pharynx: Oropharynx is clear.   Eyes:      General: Scleral icterus present.      Extraocular Movements: Extraocular movements intact.      Pupils: Pupils are equal, round, and reactive to light.   Cardiovascular:      Rate and Rhythm: Normal rate and regular rhythm.      Pulses: Normal pulses.      Heart sounds: No murmur.   Pulmonary:      Effort: Pulmonary effort is normal.      Comments: Diminished in bases  Abdominal:      General: There is distension.      Palpations: Abdomen is soft.      Tenderness: There is abdominal tenderness. There is no guarding or rebound.   Musculoskeletal:      Cervical back: Normal range of motion.      Right lower leg: Edema present.      Left lower leg: Edema present.      Comments: 2+ pitting edema  bilaterally up to knees   Skin:     General: Skin is dry.      Capillary Refill: Capillary refill takes more than 3 seconds.      Coloration: Skin is jaundiced.      Findings: Bruising present. No rash.      Comments: Excoriations on hands bilaterally   Neurological:      General: No focal deficit present.      Mental Status: He is disoriented.      Cranial Nerves: No cranial nerve deficit.      Motor: Weakness (generalized) present.   Psychiatric:      Comments: Lethargic and confused       Physical exam unchanged from 4/15/2021 aside from what is noted above    Fluids  No intake or output data in the 24 hours ending 04/17/21 1137    Laboratory  Recent Labs     04/15/21  1716 04/15/21  1911 04/16/21  0353   WBC 10.3 10.5 11.5*   RBC 6.20* 6.25* 5.78   HEMOGLOBIN 22.0* 22.0* 20.4*   HEMATOCRIT 59.0* 59.5* 55.5*   MCV 95.2 95.2 96.0   MCH 35.5* 35.2* 35.3*   MCHC 37.3* 37.0* 36.8*   RDW 66.3* 67.4* 65.9*   PLATELETCT 100* 100* 76*   MPV  --  14.2*  --      Recent Labs     04/15/21  0627 04/16/21  0353 04/17/21  0547   SODIUM 129* 132* 134*   POTASSIUM 4.9 4.6 5.1   CHLORIDE 96 96 97   CO2 18* 16* 16*   GLUCOSE 100* 115* 82   BUN 72* 74* 95*   CREATININE 0.37* 0.81 0.40*   CALCIUM 10.9* 11.0* 11.1*     Recent Labs     04/15/21  1336   INR 1.72*               Imaging  [id number]   Final Result            1. Successful transjugular liver biopsy.      2. The corrected wedged hepatic venous pressure was 14 mmHg consistent with portal hypertension.      US-PARACENTESIS, ABD WITH IMAGING   Final Result      1. Ultrasound-guided therapeutic and diagnostic paracentesis of the right lower quadrant of the abdominal wall.      2. 2050 mL of fluid withdrawn.      CT-ABDOMEN-PELVIS WITH   Final Result         1.  Enhancement of the gastric mucosa, consider gastritis.   2.  Scattered large quantity of abdominal ascites   3.  Hepatomegaly and diffuse hepatic steatosis   4.  Diverticulosis   5.  Atherosclerosis and  atherosclerotic coronary artery disease.           Assessment/Plan  * Subacute liver failure with hepatic coma (HCC)  Assessment & Plan  Patient presented with increasing abdominal ascites and jaundice with being followed by outpatient GI but presented due to worsening labs and for further evaluation and testing  Since admission patient has undergone gastric biopsy which is proven on diagnostic as well as liver biopsy which is pending  -Patient continues to have progressive liver failure and now is confused and alert oriented to self only  -Unclear etiology at this time  -GI nephrology and hematology oncology are following, appreciate their help with management and recommendations  -Did have a long discussion with wife regarding poor prognosis, however she would like to wait for biopsy results prior to making any final decisions regarding patient's end-of-life decisions  - started on solumedrol for possible autoimmune/inflmamatory condition, will monitor   -Supportive care as below    Ascites- (present on admission)  Assessment & Plan  Presented with increasing abdominal distention and ascites, status post paracentesis done approximately 2 weeks ago   CT abd showing: Enhancement of the gastric mucosa, consider gastritis., Scattered large quantity of abdominal ascites, Hepatomegaly and diffuse hepatic steatosis  Unclear etiology at this time, suspect liver disease may be secondary to underlying neoplastic vs lymphoproliferative disorder   Pending liver biopsy pathology   Repeat paracentesis done 4/15 with path pending, no signs of infection etiology, no overt malignant cells  SAAG score >1.1 suggesting portal hypertension is likely cause of ascites   GI following, appreciate their help with management and recommendations  EGD 4/14, gastric biopsies done, path negative for malignancy, celiac's, or amyloidosis  Continue Lasix, discontinue aldactone due to soft pressures     Hyperbilirubinemia- (present on  admission)  Assessment & Plan  Progressive worsening of bilirubin , bili >30 today   Unclear etiology at this time, has had extensive work-up with gastroenterology including a negative MRI and CT scan without evidence of liver abnormality or biliary dilation/obstruction or abnormality   Autoimmune liver disease workup thus far negative   Unlikely wilsons given no neurological manifestations however will check serum copper, this is still pending   Iron WNL  Elevated LDH, low haptoglobin, erythrocytosis, possible hemolytic process? Rosendo neg, elevated reticulocyte count which is abnormal   GI following appreciate their help with management and recommendations  Gastric biopsy showing no evidence of malignancy or deposition   Pending liver biopsy pathology results  Heme/Onc following, appreciate recommendations       Leukocytosis  Assessment & Plan  WBC mildly elevated today at 11.5, likely reactive, however will cont. To monitor closely for signs of infection   - paracentesis done 4/14 not suggestive of infection   - if pt does spike fever, would start empiric antibiotics for possible asp. pneumonia vs SBP     Acute encephalopathy  Assessment & Plan  Acute encephalopathy secondary to worsening liver disease/liver failure, does also have rising BUN as well   - ammonia 48 now 21, continue lactulose   - NPO pending speech evaluation, will likely need Cortrak for supplemental nutrition   - fall, aspiration and seizure precautions.   - serum copper pending, serum Iron wnl, Vit B6 low will start supplementation, check Vit. B12   - supportive care       Metabolic acidosis- (present on admission)  Assessment & Plan  Worsening metabolic acidosis with AG 21, Bicarb 16, likely multifactorial from poor oral intake as well as liver failure   - continue IVF, continue to monitor closely   - if it continues to worsen, may consider bicarb ggt     Erythrocytosis- (present on admission)  Assessment & Plan  RBC count elevated, this has  fluctuated but has been overall elevated, question possible lymphoproliferative disorder   - denies testosterone use, is not a smoker   - elevated reticulocyte count which is abnormal in the setting of erythrocytosis   - erythropoietin pending   - no adrenal or renal mass noted on imaging   - Hematology following   - can consider bone marrow biopsy ?    Tumor lysis syndrome  Assessment & Plan  Possible TLS, elevated phos and Uric acid in setting of possible underlying malignancy, stable   - Calcium is high which is not consistent but still with elevated Uric acid and Phos   - started on allopurinol and IVF   - continue to monitor closely   - Heme/Onc and Nephrology consulted and following, appreciate recommendations     Hypercalcemia- (present on admission)  Assessment & Plan  Corrected calcium increasing 11.7-->12.1-> 12.2 0> 12.1  Possible hypercalcemia of malignancy ?  No bony lesions or osteoblastic lesions noted on imaging thus far  Cont. IVF, continue lasix as this can help remove Ca as well  We will continue to closely monitor, if it continues to increase we will consider zoledronic acid vs calcitonin     Hyponatremia- (present on admission)  Assessment & Plan  Improving, Na 134, suspect this is due to his liver disease   Now on IVF for hypercalcemia and possible TLS  Continue lasix, dc spironolactone  Continue to monitor    Elevated troponin- (present on admission)  Assessment & Plan  Troponin 77 on arrival, repeat elevated but consistent with initial  Echo 04/13 reviewed showing EF 65%  EKG - sinus rhythm, probable inferior infarct     Transaminitis- (present on admission)  Assessment & Plan  Ast/ALT continues to increase, unclear etiology. Suspect liver pathology is secondary to possible neoplastic vs lymphoproliferative disorder   Denies alochol use, drug use or significant exposure, hep panel negative   Autoimmune workup thus far negative, imaging not diagnostic   Viral etiologies like EBV, HSV, pending    SPEP pending   Copper pending   Liver biopsy 4/16, path pending   Started on solumedrol, monitor for worsening TLS closely   Elevated liver pressure consistent with portal hypertension   GI following, appreciate help with management   Cont. To monitor closely     HTN (hypertension)- (present on admission)  Assessment & Plan  BP on arrival 175/104 HR 80, now with soft pressures   Clonidine prn  Enalapril prn  Continue Lasix as BP tolerates, discontinue spironolactone       VTE prophylaxis: Lovenox

## 2021-04-17 NOTE — DIETARY
Nutrition Support Assessment:  Day 4 of admit.  Adithya Cohn is a 80 y.o. male with admitting DX of hyperbilirubinemia.     Current problem list:  - Hyperbilirubinemia, ascites, liver failure with hepatic coma, erythrocytosis, metabolic acidosis, acute encephalopathy, leukocytosis, tumor lysis syndrome, HTN, transaminitis, elevated troponin, hyponatremia, hypercalcemia.      Assessment:  Estimated Nutritional Needs based on:   Height: 182.9 cm (6')  Weight: 79.8 kg (175 lb 14.8 oz)  Weight to Use in Calculations: 80.4 kg (177 lb 4 oz) - Standing scale weight on .   Body mass index is 23.86 kg/m²., BMI classification: Normal.     Calculation/Equation: MSJ x 1.2 = 1876.  Total Calories / day: 1850 - 2010 (Calories / k - 25)  Total Grams Protein / day: 96 - 121 (Grams Protein / k.2 - 1.5)     Evaluation:   1. Admitted with abdominal pain and progressive jaundice. PMHx of HTN, hyperlipidemia, and anxiety.   2. Followed by outpatient GI doctor for elevated liver test and development of ascites.   3. Liver biopsy negative for malignancy.   4. Nephrology consulted - daily follow-up to determine RRT needs.   5. Made npo d/t concern for aspiration. Plan for cortrak placement.   6. Labs: Na 134, K 5.1, BUN 95, creatinine 0.40,  (high & trending up), ALT 95 (high & trending up), phos 5.3.   7. Meds: pepcid, lasix, methylprednisolone, vitamin B6, bowel regimen.   8. GI: + BM .   9. Edema: 1+ pitting (RLE, LLE).   10. Given elevated phosphorus and no current plan for HD - recommend specialized renal formula.      Malnutrition Risk: unable to fully assess at this time.      Recommendations/Plan:  1. Start Novasource Renal @ 25 ml/hr, advance per protocol to goal of 40 ml/hr to provide 1920 kcal, 87 gm protein and 691 ml free water.   2. Fluids per MD.   3. Diet advancement per SLP.     RD Following.

## 2021-04-17 NOTE — THERAPY
Missed Therapy     Patient Name: Adithya Cohn  Age:  80 y.o., Sex:  male  Medical Record #: [id number]  Today's Date: 4/17/2021 04/17/21 1434   Treatment Variance   Reason For Missed Therapy Medical - Other (Please Comment)  (Clarifying goals of care)   Interdisciplinary Plan of Care Collaboration   IDT Collaboration with  Nursing   Collaboration Comments SLP orders received, chart reviewed, RN consulted. Clinical Swallow Evaluation attempted, though pt transferring rooms. RN also indicated goals of care discussion is in process. SLP will hold and evaluate as appropriate.

## 2021-04-17 NOTE — PROGRESS NOTES
[facility] -  PROGRESS NOTE               Author: Buddy Michel M.D. Date & Time: 4/17/2021  11:00 AM     HPI:  Patient is a 80 year old male with a PMHx of HTN, HLD, anxiety who presented on 4/13/21 with abdominal pain with progressive jaundice.  Seen by GI in the past for elevated LFTs and ascities but work up with hepatitis panel, and autoimmune including JOSE, actib Ab has been negative.  Abdominal ultrasound of liver with echogenic liver lesion thought to be hemangioma.  In the ER found to have HTN with sodium 132, elevated LFTs and tbili of 24.8.  Calcium 11.6.  Nephrology consulted for possible TLS.     Patient currently confused.    DAILY NEPHROLOGY SUMMARY:  - 4/16 Consult done  4/17 - Remains confused.      REVIEW OF SYSTEMS:    Unable due to acuity    PMH/PSH/SH/FH: Reviewed and unchanged since admission note  CURRENT MEDICATIONS: Reviewed from admission to present day    VS:  /85   Pulse 89   Temp (!) 35.7 °C (96.3 °F) (Temporal)   Resp 19   Ht 1.829 m (6')   Wt 79.8 kg (175 lb 14.8 oz)   SpO2 91%   BMI 23.86 kg/m²   Constitutional: No distress.   HENT:   Head: Normocephalic.   Eyes: Pupils are equal, round, and reactive to light.   Cardiovascular: Normal rate.   Pulmonary/Chest: Effort normal.   Abdominal: Soft. He exhibits distension.   Musculoskeletal:         General: Edema present.      Cervical back: Normal range of motion.   Neurological:   confused   Skin: Skin is warm.     Fluids:  No intake/output data recorded.    LABS:  Recent Labs     04/15/21  0627 04/16/21  0353 04/17/21  0547   SODIUM 129* 132* 134*   POTASSIUM 4.9 4.6 5.1   CHLORIDE 96 96 97   CO2 18* 16* 16*   GLUCOSE 100* 115* 82   BUN 72* 74* 95*   CREATININE 0.37* 0.81 0.40*   CALCIUM 10.9* 11.0* 11.1*       IMPRESSION:  # Elevated Uric Acid              - Unclear if this is TLS but patient has elevated phos and uric acid, but calcium is also elevated              - On allopurinol allopurinol  #  Hypercalcemia              - Corrected calcium is about 12.1              - Continue gentle IVF hydration for now              - Sent for SPEP  # Ascites  # Hyperbilirubinemia              - S/p Liver biopsy  # Elevated troponin  # Transaminitis  # HTN              - Currently BP has been lower              - Hold spironolactone for now              - Consider holding lasix if trending lower  # Acidosis   - Start sodium bicarb TID  # Hyponatremia    - Mild, likely from liver disease     PLAN:  - Continue allopurinol 100mg PO Q8H  - Continue gentle IVF for hypercalcemia  - Can start sodium bicarb 650mg PO TID  - Hold spironolactone given lower Bps  - Continue lasix  - Follow up liver biopsy  - Send for SPEP  - Daily evaluation for RRT needs  - Dose all meds per eGFR < 15     Thank you for the consultation!

## 2021-04-17 NOTE — PROGRESS NOTES
Gastroenterology Progress Note     Author: CARLIN Carias   Date & Time Created: 4/17/2021 9:14 AM    Chief Complaint:  Jaundice, ascites    History of Presenting Illness  80 y.o. male with HTN, hyperlipidemia, back pain who presented 4/13/2021 with progressive weakness, edema, bloating.     He has been followed by Dr. Mcmillan as outpatient over the past 2 months for elevated liver tests, jaundice, ascites.  Has had extensive evaluation.     He initially developed some abdominal bloating following back surgery 9/2020.  He was started on tizanidine and it was felt this may be contributing so this was stopped around 12/2020.  Denies other new medications, herbal supplements, OTC meds.  He remembers having fevers around time of back surgery but no other symptoms.     Then, in February, his symptom of bloating was increasing.  He had initial labs 2/2021 showing t bili 3.2, , , alk phos 551.  Labs summer 2020 with normal liver tests.  Since then, his LFTs have continued to rise.  Labs now show t bili 20.7, alk phos 353, AST 90, ALT 51, INR 1.4.  He has had intermittent erythrocytosis with elevated Hgb as well as mild hypercalcemia as well.     Prior evaluation has included the following.  Acute viral hepatitis panel negative. , ferritin 165, iron sat 29%, JOSE negative, Actin Ab negative, LKM Ab negative, IgG low at 625, AMA negative, anti sp 100 neg, anti gp 210 negative, alk phos isoenzymes with high liver and bone fractions, A1AT nl, TSh nl, celiac negative, LDH high at 395, haptoglobin low <10, folate nl, B12 high, Vitamin A slightly low, CA 19-9 nl, AFP nl, PSA nl, TB quantiferon negative, PTH normal, lipase normal.  Abdominal US with normal liver with echogenic liver lesion likely hemangioma, ascites, normal spleen.  MRI abdomen with and without shows hepatic hemangioma, normal liver appearance, normal spleen, normal bile ducts.  Possible thickening stomach.  CT scan shows hepatomegaly but  "normal liver appearance and normal portal vein.  Again possible thickening of stomach.  Echocardiogram shows EF 65%, diastolic dysfunction, possible dynamic LVOT, normal RV size and function.  Prior paracentesis with cytology only showing some reactive cells but no definite malignancy.     He denies alcohol or other drug use.  Denies any exposure to farm animals, ticks, other sick contacts.     Interval History:  4/15/2021: Still abdominal discomfort.  Some diarrhea with scant melena.  Very weak and tired.  Labs with rising BUN, calcium and bilirubin.  Paracentesis yesterday without SBP but consistent with portal HTN.  Other labs reuested do not appear to have been drawn.    4/16/2021: Confused after getting sedation for liver biopsy.  Possible abdominal pain.  Labs with rising BUN, calcium, liver tests, INR.  Clinically worsening.  Had transjugular liver biopsy today.    4/17/2021-confused, alert and oriented x1.  Biopsy results still pending.  BUN and bilirubin continue to elevate.  LFTs up as well.  Ammonia level normal.  Elevated anion gap.   Jodie: I talked to the pt, his daughter and wife. Pt's daughter said they decided no more blood tests, comfort care until liver biopsy, NPO for patient for concern of aspiration. When I asked the patient directly what the patient wanted, the daughter was furiously and walked out of the room. Discussed with the wife in mahendra and direct way that the goal should be \"what the patient wants\", not \"what the family wants\". The patient did not provide immediate answer. I encourage the wife and the patient continue to communicate his wishes at this critical moment.     Review of Systems:  Review of Systems   Unable to perform ROS: Acuity of condition   Gastrointestinal: Positive for abdominal pain.   Neurological: Positive for weakness.       Physical Exam:  Physical Exam  Vitals and nursing note reviewed.   Constitutional:       General: He is not in acute distress.     Appearance: " He is ill-appearing.   Eyes:      General: Scleral icterus present.   Cardiovascular:      Rate and Rhythm: Normal rate and regular rhythm.      Pulses: Normal pulses.      Heart sounds: Normal heart sounds.   Pulmonary:      Breath sounds: Decreased breath sounds present.   Abdominal:      General: Abdomen is flat. Bowel sounds are normal. There is distension.      Palpations: Abdomen is soft. There is no mass.      Tenderness: There is no abdominal tenderness. There is no guarding or rebound.      Hernia: No hernia is present.   Skin:     Coloration: Skin is jaundiced.   Neurological:      Mental Status: He is alert. He is disoriented.         Labs:          Recent Labs     04/15/21  0627 04/16/21  0353 04/17/21  0547   SODIUM 129* 132* 134*   POTASSIUM 4.9 4.6 5.1   CHLORIDE 96 96 97   CO2 18* 16* 16*   BUN 72* 74* 95*   CREATININE 0.37* 0.81 0.40*   MAGNESIUM  --   --  2.5   PHOSPHORUS  --  5.4*  --    CALCIUM 10.9* 11.0* 11.1*     Recent Labs     04/15/21  0627 04/16/21  0353 04/17/21  0547   ALTSGPT 58* 59* 95*   ASTSGOT 116* 110* 210*   ALKPHOSPHAT 354* 328* 352*   TBILIRUBIN 23.1* 25.5* 30.4*   GLUCOSE 100* 115* 82     Recent Labs     04/15/21  1336 04/15/21  1716 04/15/21  1911 04/16/21  0353   RBC  --  6.20* 6.25* 5.78   HEMOGLOBIN  --  22.0* 22.0* 20.4*   HEMATOCRIT  --  59.0* 59.5* 55.5*   PLATELETCT  --  100* 100* 76*   PROTHROMBTM 20.7*  --   --   --    INR 1.72*  --   --   --      Recent Labs     04/15/21  0627 04/15/21  1716 04/15/21  1911 04/16/21  0353 21  0547   WBC  --  10.3 10.5 11.5*  --    NEUTSPOLYS  --  86.20*  --  84.80*  --    LYMPHOCYTES  --  5.50*  --  4.20*  --    MONOCYTES  --  7.60  --  7.60  --    EOSINOPHILS  --  0.00  --  3.40  --    BASOPHILS  --  0.20  --  0.00  --    ASTSGOT 116*  --   --  110* 210*   ALTSGPT 58*  --   --  59* 95*   ALKPHOSPHAT 354*  --   --  328* 352*   TBILIRUBIN 23.1*  --   --  25.5* 30.4*     Hemodynamics:  Temp (24hrs), Av.9 °C (96.6 °F), Min:35.6  °C (96.1 °F), Max:36.2 °C (97.2 °F)  Temperature: (!) 35.7 °C (96.3 °F)  Pulse  Av.5  Min: 65  Max: 117   Blood Pressure : 132/85  CVP (mm Hg): (!) -1 MM HG  Respiratory:    Respiration: 19, Pulse Oximetry: 91 %           Fluids:    Intake/Output Summary (Last 24 hours) at 4/15/2021 0910  Last data filed at 2021 1800  Gross per 24 hour   Intake 240 ml   Output 2450 ml   Net -2210 ml        GI/Nutrition:  Orders Placed This Encounter   Procedures   • Diet NPO     Standing Status:   Standing     Number of Occurrences:   1     Order Specific Question:   Restrict to:     Answer:   Strict [1]     Medical Decision Making, by Problem:  Active Hospital Problems    Diagnosis    • Hyperbilirubinemia [E80.6]    • Ascites [R18.8]    • HTN (hypertension) [I10]    • Transaminitis [R74.01]    • Elevated troponin [R77.8]    • Hyponatremia [E87.1]    • Hypercalcemia [E83.52]      EGD Findings (4/15/2021):  1. Diffuse thickening and erythema with scattered small heme and friability to gastric mucosa.  Biopsies obtained.  2. Diffuse edema and erythema with flattening of villi in duodenum.  Biopsies obtained.    Assessment/Plan  81 y/o with HTN, hyperlipidemia, history of prostate cancer with progressive jaundice, ascites with associated erythrocytosis and hypercalcemia of unclear etiology.  Has had extensive evaluation as outpatient without clear cause determined.  Still question paraneoplastic process causing cholestatic hepatitis related to undiagnosed malignancy (Stauffers syndrome) versus DILI versus autoimmune.  Also consider amyloid or intestinal lymphoma given diffuse thickening of stomach and duodenum on endoscopy.  Slowly worsening.     PROBLEMS:  1. Jaundice  2. Elevated liver tests  3. Erythrocytosis  4. Hypercalcemia  5. Ascites  6. Generalized weakness  7. Hyponatremia  8. Azotemia  9. Protein-calorie malnutrition, moderate  10. Hypertension  11. Hyperlipidemia  12. History of prostate cancer    Plan:  1. Await  liver biopsy results.  Gastric and duodenal biopsies nonrevealing.  Negative for celiac disease, malignancy, amyloid  2. Start 60 mg IV Solumedrol for potential autoimmune mediated liver process given worsening clinical status  3. Appreciate hematology recommendations, may require bone marrow biopsy?  4. Palliative care per patient's family decision    Quality-Core Measures   Reviewed items::  Medications reviewed, Labs reviewed and Radiology images reviewed    I have seen and examined the patient today.  I have reviewed the medical record, laboratory data, imaging, and all relevant studies.  I have discussed the assessment and plan of care with Mark GILLIS and agree with their note and plan of care as documented.   Tiffanie Feliciano M.D.

## 2021-04-17 NOTE — ASSESSMENT & PLAN NOTE
RBC count elevated, this has fluctuated but has been overall elevated, question possible lymphoproliferative disorder   - denies testosterone use, is not a smoker   - elevated reticulocyte count which is abnormal in the setting of erythrocytosis   - erythropoietin pending   - no adrenal or renal mass noted on imaging   -Patient transition to comfort care measures

## 2021-04-17 NOTE — ASSESSMENT & PLAN NOTE
Acute encephalopathy secondary to worsening liver disease/liver failure, does also have rising BUN as well   - ammonia 48 did improve to 21 with lactulose  -Core track was unable to be placed yesterday, family requested for core track not to be placed following failed attempts  - fall, aspiration and seizure precautions.   - serum copper pending, serum Iron wnl, Vit B6 low  -Comfort care

## 2021-04-17 NOTE — PROGRESS NOTES
Oncology/Hematology Progress Note               Author: Carlene Knott M.D. Date & Time created: 4/17/2021  1:24 PM   Diagnosis-?  Tumor lysis  Interval History:  Liver biopsy shows no evidence of malignancy.  Wife at the bedside.    Review of Systems:  Review of Systems   Unable to perform ROS: Mental acuity       Physical Exam:  Physical Exam  Constitutional:       Appearance: He is ill-appearing and toxic-appearing.   Eyes:      General: Scleral icterus present.   Abdominal:      General: Abdomen is flat.      Comments: Minimal hepatomegaly   Skin:     General: Skin is warm and dry.      Coloration: Skin is jaundiced.         Labs:          Recent Labs     04/15/21  0627 04/16/21  0353 04/17/21  0547   SODIUM 129* 132* 134*   POTASSIUM 4.9 4.6 5.1   CHLORIDE 96 96 97   CO2 18* 16* 16*   BUN 72* 74* 95*   CREATININE 0.37* 0.81 0.40*   MAGNESIUM  --   --  2.5   PHOSPHORUS  --  5.4* 5.3*   CALCIUM 10.9* 11.0* 11.1*     Recent Labs     04/15/21  0627 04/16/21  0353 04/17/21  0547   ALTSGPT 58* 59* 95*   ASTSGOT 116* 110* 210*   ALKPHOSPHAT 354* 328* 352*   TBILIRUBIN 23.1* 25.5* 30.4*   GLUCOSE 100* 115* 82     Recent Labs     04/15/21  1336 04/15/21  1716 04/15/21  1911 04/16/21  0353   RBC  --  6.20* 6.25* 5.78   HEMOGLOBIN  --  22.0* 22.0* 20.4*   HEMATOCRIT  --  59.0* 59.5* 55.5*   PLATELETCT  --  100* 100* 76*   PROTHROMBTM 20.7*  --   --   --    INR 1.72*  --   --   --      Recent Labs     04/15/21  0627 04/15/21  1716 04/15/21  1911 04/16/21  0353 04/17/21  0547   WBC  --  10.3 10.5 11.5*  --    NEUTSPOLYS  --  86.20*  --  84.80*  --    LYMPHOCYTES  --  5.50*  --  4.20*  --    MONOCYTES  --  7.60  --  7.60  --    EOSINOPHILS  --  0.00  --  3.40  --    BASOPHILS  --  0.20  --  0.00  --    ASTSGOT 116*  --   --  110* 210*   ALTSGPT 58*  --   --  59* 95*   ALKPHOSPHAT 354*  --   --  328* 352*   TBILIRUBIN 23.1*  --   --  25.5* 30.4*     Recent Labs     04/15/21  0627 04/16/21  0353 04/17/21  0547   SODIUM 129*  132* 134*   POTASSIUM 4.9 4.6 5.1   CHLORIDE 96 96 97   CO2 18* 16* 16*   GLUCOSE 100* 115* 82   BUN 72* 74* 95*   CREATININE 0.37* 0.81 0.40*   CALCIUM 10.9* 11.0* 11.1*     Hemodynamics:  Temp (24hrs), Av.9 °C (96.7 °F), Min:35.7 °C (96.3 °F), Max:36.2 °C (97.2 °F)  Temperature: 36 °C (96.8 °F)  Pulse  Av.5  Min: 65  Max: 117   Blood Pressure : 129/85    Respiratory:    Respiration: 18, Pulse Oximetry: 90 %     Work Of Breathing / Effort: Mild  RUL Breath Sounds: Diminished, RML Breath Sounds: Diminished, RLL Breath Sounds: Diminished, RAIZA Breath Sounds: Diminished, LLL Breath Sounds: Diminished  Fluids:    Intake/Output Summary (Last 24 hours) at 2021 1324  Last data filed at 2021 1100  Gross per 24 hour   Intake --   Output 75 ml   Net -75 ml        GI/Nutrition:  Orders Placed This Encounter   Procedures   • Diet NPO     Standing Status:   Standing     Number of Occurrences:   1     Order Specific Question:   Restrict to:     Answer:   Sips with Medications [3]     Medical Decision Making, by Problem:  Active Hospital Problems    Diagnosis    • *Subacute liver failure with hepatic coma (HCC) [K72.01]    • Hyperbilirubinemia [E80.6]    • Ascites [R18.8]    • Erythrocytosis [D75.1]    • Metabolic acidosis [E87.2]    • Acute encephalopathy [G93.40]    • Leukocytosis [D72.829]    • 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  No lymphadenopathy or hepatosplenomegaly.  Repeat biopsies if other specialties feel the need to do so.  ?  Tumor lysis-nephrology consultation appreciated.  On allopurinol.  Patient's wife's questions answered to her satisfaction.  Patient is critically sick.  Prognosis poor.    Quality-Core Measures

## 2021-04-17 NOTE — PROGRESS NOTES
Monitor Summary     Rhythm SR  HR Range 76-81  Ectopy rare PVC, rare PAC  Measurements 0.12 / 0.08 / 0.40

## 2021-04-17 NOTE — PROGRESS NOTES
Received report from night shift RN.  Assumed care at 0700. Pt denies any discomfort at this time.  Call light within reach. Bed locked and in lowest position.  Non skid socks on, Belongings within  Reach.  Will continue to monitor hourly.

## 2021-04-17 NOTE — CARE PLAN
Fall risk assessed, fall precautions in place, bed alarm on, pt verbalizes understanding of fall risk but needs reinforcement is very forgetful.   Pt and family educated on POC, all questions answered in regards to disease process, treatment and diet. Pt and family verbalize understanding and voice no further questions at this time.

## 2021-04-17 NOTE — ASSESSMENT & PLAN NOTE
WBC mildly elevated today at 11.5, likely reactive, however will cont. To monitor closely for signs of infection   - paracentesis done 4/14 not suggestive of infection   Comfort care

## 2021-04-17 NOTE — ASSESSMENT & PLAN NOTE
Worsening metabolic acidosis with AG 21, Bicarb 16, likely multifactorial from poor oral intake as well as liver failure   - continue IVF, continue to monitor closely   Comfort care

## 2021-04-17 NOTE — PROGRESS NOTES
Assumed care of PT A&O 1. Pt resting in bed with no signs of labored breathing. Tele monitor in place, cardiac rhythm being monitored. Call light within reach, bed in lowest position, upper bed rails up. Son at bedside. Pt and family updated on plan of care for the night.

## 2021-04-17 NOTE — ASSESSMENT & PLAN NOTE
Patient presented with increasing abdominal ascites and jaundice with being followed by outpatient GI but presented due to worsening labs and for further evaluation and testing  Since admission patient has undergone gastric biopsy which is proven on diagnostic as well as liver biopsy which is pending  -Patient continues to have progressive liver failure and now is confused and alert oriented to self only  -Unclear etiology at this time  -GI nephrology and hematology oncology are following, appreciate their help with management and recommendations  -Did have a long discussion with wife regarding poor prognosis, however she would like to wait for biopsy results prior to making any final decisions regarding patient's end-of-life decisions  - started on solumedrol for possible autoimmune/inflmamatory condition, did stop this today 4/18/2021 as it was not having any benefit  -Patient has been transition to comfort care measures, however family still awaiting liver biopsy results prior to discussion regarding hospice

## 2021-04-18 NOTE — PROGRESS NOTES
Report received from day shift RN, assumed care of pt. Pt A&Ox1 on 2L via NC. Plan of care discussed with family at bedisde, labs and chart reviewed. All needs met at this time. Tele box on. Call light within reach, bed locked and in lowest position. All fall precautions and hourly rounding in place. Will continue to monitor.

## 2021-04-18 NOTE — CARE PLAN
Problem: Pain Management  Goal: Pain level will decrease to patient's comfort goal  Outcome: PROGRESSING AS EXPECTED   Pt reports no pain at this time.   Problem: Communication  Goal: The ability to communicate needs accurately and effectively will improve  Outcome: PROGRESSING SLOWER THAN EXPECTED   Pt is A&Ox1. Does not call. Can communicate needs intermittently.   Problem: Knowledge Deficit  Goal: Knowledge of disease process/condition, treatment plan, diagnostic tests, and medications will improve  Outcome: PROGRESSING SLOWER THAN EXPECTED   Pt is A&Ox1. No evidence of learning regarding discussion of plan of care.   Problem: Psychosocial Needs:  Goal: Level of anxiety will decrease  Outcome: PROGRESSING SLOWER THAN EXPECTED   Pt appears restless. Denies pain. Ativan given, will continue to monitor.

## 2021-04-18 NOTE — PROGRESS NOTES
Page to Dr. Mcneil placed regarding medication and telemetry monitor orders. Family requested no ativan or morphine; requested diazepam for anxiety. Per MD, preferable to give ativan for anxiety based on shorter half-life and ability to adjust dose as needed to keep pt comfortable. Nursing communication for discontinuation of tele placed. Medication orders placed. Spoke with pt's wife and explained rational for ativan. Per wife, okay to give in order to keep pt comfortable. Will continue to monitor.

## 2021-04-18 NOTE — CARE PLAN
Problem: Communication  Goal: The ability to communicate needs accurately and effectively will improve  Outcome: PROGRESSING SLOWER THAN EXPECTED     Problem: Safety  Goal: Will remain free from injury  Outcome: PROGRESSING AS EXPECTED  Goal: Will remain free from falls  Outcome: PROGRESSING AS EXPECTED     Problem: Skin Integrity  Goal: Risk for impaired skin integrity will decrease  Outcome: PROGRESSING AS EXPECTED

## 2021-04-18 NOTE — PROGRESS NOTES
[facility] Medicine Daily Progress Note    Date of Service  4/18/2021    Chief Complaint  80 y.o. male admitted 4/13/2021 with abdominal swelling and jaundice that has been progressive over the last several weeks, patient was sent by his physician to ER for further evaluation    Hospital Course  This is a very pleasant 80-year-old gentleman with a past medical history of hypertension, hyperlipidemia and anxiety who presented on 4/13/2021 for evaluation of abdominal swelling and progressive jaundice.  Patient has been followed by gastroenterology Dr. Mcmillan as outpatient for the last 2 months for elevated liver test and a development of ascites.  He has had extensive evaluation including a negative acute viral hepatitis panel, negative autoimmune work-up including OJSE actin AB, anti-sp, and anti-GP.  Abdominal ultrasound with normal liver and echogenic liver lesion likely hemangioma, MRI with and without showing hepatic hemangioma, normal liver appearance normal spleen and normal bile ducts possible thickening of the stomach was noted.  He underwent CT scan which showed hepatomegaly but normal level appearance with a normal portal vein and again possible thickening of the stomach.  He had an echocardiogram showed an EF of 65%, diastolic dysfunction, possible dynamic LVOT, normal RV size and function.  He underwent prior paracentesis with cytology only showing reactive cells but no definitive malignancy.  Patient has no history of alcohol or other drug use he denies any known significant exposures    On arrival here he had a BP of 175/104 and a heart rate of 80  remarkable labs include hemoglobin of 20.1, hematocrit of 60.1, troponin of 77 sodium of 132, glucose 117, BUN 51, calcium 11.6, AST over /60, alk phos of 464, total bili of 24.8, albumin 3.1.     Since admission patient has underwent EGD with gastric biopsy as well as transjugular liver biopsy.  These results are pending.   He has continued rapid  deterioration with worsening liver failure      Interval Problem Update  Patient seen and examined at bedside, patient is obtunded, not responsive does not follow commands, withdraws to pain   -Comfort measures  -Continuing IV fluids at family request    Late yesterday evening, I personally witnessed episode of suspected PEA, was attempting to help son get patient onto commode when he became unresponsive, apneic and pulseless.  Review of telemetry shows normal sinus rhythm  during this time.  This lasted approximately 1 to 2 minutes.  On attempt to get patient back into bed he had spontaneous recovery of breathing, but has since remained unresponsive.  After this episode I discussed with family at length his very poor prognosis and unlikely recovery.  Patient was then made comfort care.  Comfort medication such as morphine and Ativan were limited as patient was not in any acute distress and was not agitated, and family did not want to hasten his death if he was not uncomfortable.  Agreed for limited supportive care pending liver biopsy results.  Again rediscussed with Franky, his wife and DAFNE, his current medical status We discussed hospice however she would like to wait till at least Monday for biopsy results.  Overall I spent an additional 1 hour with family at bedside discussing patient current medical status, ongoing plans of care, options of care and advanced care planning.       Consultants/Specialty  Gastroenterology-GI consultants  Hematology oncology  Nephrology    Code Status  Comfort Care/DNR    Disposition  Comfort care, possible hospice pending family decision    Review of Systems  Review of Systems   Unable to perform ROS: Mental status change   Unable to perform due to patient's altered mental status    Physical Exam  Temp:  [35.8 °C (96.5 °F)-36.9 °C (98.4 °F)] 35.8 °C (96.5 °F)  Pulse:  [52-91] 52  Resp:  [16-20] 20  BP: (103-141)/(65-87) 103/65  SpO2:  [92 %-99 %] 92 %    Physical Exam  Vitals and  nursing note reviewed.   Constitutional:       General: He is not in acute distress.     Appearance: He is ill-appearing and toxic-appearing.      Comments: Unresponsive   HENT:      Head: Normocephalic and atraumatic.      Mouth/Throat:      Mouth: Mucous membranes are dry.      Pharynx: Oropharynx is clear.   Eyes:      General: Scleral icterus present.   Cardiovascular:      Rate and Rhythm: Normal rate and regular rhythm.   Pulmonary:      Comments: Intermittent apnea  Abdominal:      General: There is distension.      Tenderness: There is abdominal tenderness.   Musculoskeletal:      Right lower leg: Edema present.      Left lower leg: Edema present.      Comments: 2+ pitting edema bilaterally up to knees   Skin:     General: Skin is dry.      Capillary Refill: Capillary refill takes more than 3 seconds.      Coloration: Skin is jaundiced.      Findings: Bruising present. No rash.      Comments: Excoriations on hands bilaterally   Neurological:      Mental Status: He is disoriented.      Comments: Patient is obtunded, withdraws to pain   Psychiatric:      Comments: Lethargic and confused       Physical exam unchanged from 4/15/2021 aside from what is noted above    Fluids  No intake or output data in the 24 hours ending 04/18/21 1505    Laboratory  Recent Labs     04/15/21  1911 04/16/21  0353 04/17/21  0547   WBC 10.5 11.5* 14.2*   RBC 6.25* 5.78 6.49*   HEMOGLOBIN 22.0* 20.4* 21.5*   HEMATOCRIT 59.5* 55.5* 58.5*   MCV 95.2 96.0 96.7   MCH 35.2* 35.3* 35.5*   MCHC 37.0* 36.8* 36.8*   RDW 67.4* 65.9* 70.3*   PLATELETCT 100* 76* 86*   MPV 14.2*  --   --      Recent Labs     04/16/21  0353 04/17/21  0547   SODIUM 132* 134*   POTASSIUM 4.6 5.1   CHLORIDE 96 97   CO2 16* 16*   GLUCOSE 115* 82   BUN 74* 95*   CREATININE 0.81 0.40*   CALCIUM 11.0* 11.1*                   Imaging  [id number]   Final Result            1. Successful transjugular liver biopsy.      2. The corrected wedged hepatic venous pressure  was 14 mmHg consistent with portal hypertension.      US-PARACENTESIS, ABD WITH IMAGING   Final Result      1. Ultrasound-guided therapeutic and diagnostic paracentesis of the right lower quadrant of the abdominal wall.      2. 2050 mL of fluid withdrawn.      CT-ABDOMEN-PELVIS WITH   Final Result         1.  Enhancement of the gastric mucosa, consider gastritis.   2.  Scattered large quantity of abdominal ascites   3.  Hepatomegaly and diffuse hepatic steatosis   4.  Diverticulosis   5.  Atherosclerosis and atherosclerotic coronary artery disease.           Assessment/Plan  * Subacute liver failure with hepatic coma (HCC)  Assessment & Plan  Patient presented with increasing abdominal ascites and jaundice with being followed by outpatient GI but presented due to worsening labs and for further evaluation and testing  Since admission patient has undergone gastric biopsy which is proven on diagnostic as well as liver biopsy which is pending  -Patient continues to have progressive liver failure and now is confused and alert oriented to self only  -Unclear etiology at this time  -GI nephrology and hematology oncology are following, appreciate their help with management and recommendations  -Did have a long discussion with wife regarding poor prognosis, however she would like to wait for biopsy results prior to making any final decisions regarding patient's end-of-life decisions  - started on solumedrol for possible autoimmune/inflmamatory condition, did stop this today 4/18/2021 as it was not having any benefit  -Patient has been transition to comfort care measures, however family still awaiting liver biopsy results prior to discussion regarding hospice      Ascites- (present on admission)  Assessment & Plan  Presented with increasing abdominal distention and ascites, status post paracentesis done approximately 2 weeks ago   CT abd showing: Enhancement of the gastric mucosa, consider gastritis., Scattered large quantity  of abdominal ascites, Hepatomegaly and diffuse hepatic steatosis  Unclear etiology at this time, suspect liver disease may be secondary to underlying neoplastic vs lymphoproliferative disorder   Pending liver biopsy pathology   Repeat paracentesis done 4/15 with path pending, no signs of infection etiology, no overt malignant cells  SAAG score >1.1 suggesting portal hypertension is likely cause of ascites   EGD 4/14, gastric biopsies done, path negative for malignancy, celiac's, or amyloidosis  Increasing abdominal girth and tenderness  Patient transition to comfort care measures    Hyperbilirubinemia- (present on admission)  Assessment & Plan  Progressive worsening of bilirubin , bili >30   Unclear etiology at this time, has had extensive work-up with gastroenterology including a negative MRI and CT scan without evidence of liver abnormality or biliary dilation/obstruction or abnormality   Autoimmune liver disease workup thus far negative   Unlikely wilsons given no neurological manifestations however will check serum copper, this is still pending   Iron WNL  Elevated LDH, low haptoglobin, erythrocytosis, possible hemolytic process? Rosendo neg, elevated reticulocyte count which is abnormal   Gastric biopsy showing no evidence of malignancy or deposition   Pending liver biopsy pathology results  Comfort care measures      Leukocytosis  Assessment & Plan  WBC mildly elevated today at 11.5, likely reactive, however will cont. To monitor closely for signs of infection   - paracentesis done 4/14 not suggestive of infection   Comfort care    Acute encephalopathy  Assessment & Plan  Acute encephalopathy secondary to worsening liver disease/liver failure, does also have rising BUN as well   - ammonia 48 did improve to 21 with lactulose  -Core track was unable to be placed yesterday, family requested for core track not to be placed following failed attempts  - fall, aspiration and seizure precautions.   - serum copper  pending, serum Iron wnl, Vit B6 low  -Comfort care      Metabolic acidosis- (present on admission)  Assessment & Plan  Worsening metabolic acidosis with AG 21, Bicarb 16, likely multifactorial from poor oral intake as well as liver failure   - continue IVF, continue to monitor closely   Comfort care    Erythrocytosis- (present on admission)  Assessment & Plan  RBC count elevated, this has fluctuated but has been overall elevated, question possible lymphoproliferative disorder   - denies testosterone use, is not a smoker   - elevated reticulocyte count which is abnormal in the setting of erythrocytosis   - erythropoietin pending   - no adrenal or renal mass noted on imaging   -Patient transition to comfort care measures    Tumor lysis syndrome  Assessment & Plan  Possible TLS, elevated phos and Uric acid in setting of possible underlying malignancy, stable   - Calcium is high which is not consistent but still with elevated Uric acid and Phos   - Continue IV fluids  -Comfort care measures    Hypercalcemia- (present on admission)  Assessment & Plan  Corrected calcium increasing 11.7-->12.1-> 12.2 0> 12.1  Possible hypercalcemia of malignancy ?  No bony lesions or osteoblastic lesions noted on imaging thus far  Cont. IVF, at family's request  Comfort care measures    Hyponatremia- (present on admission)  Assessment & Plan  Improving, Na 134, suspect this is due to his liver disease   Now on IVF for hypercalcemia and possible TLS  Comfort care measures    Elevated troponin- (present on admission)  Assessment & Plan  Troponin 77 on arrival, repeat elevated but consistent with initial  Echo 04/13 reviewed showing EF 65%  EKG - sinus rhythm, probable inferior infarct     Transaminitis- (present on admission)  Assessment & Plan  Ast/ALT continues to increase, unclear etiology. Suspect liver pathology is secondary to possible neoplastic vs lymphoproliferative disorder   Denies alochol use, drug use or significant exposure,  hep panel negative   Autoimmune workup thus far negative, imaging not diagnostic   Viral etiologies like EBV, HSV, pending   SPEP pending   Copper pending   Liver biopsy 4/16, path pending   Elevated liver pressure consistent with portal hypertension   Comfort care measures    HTN (hypertension)- (present on admission)  Assessment & Plan  BP on arrival 175/104 HR 80, now with soft pressures   Comfort care measures       VTE prophylaxis: Lovenox

## 2021-04-18 NOTE — PROGRESS NOTES
Received bedside report from RN, pt care assumed, VSS, pt assessment complete. Pt AAOx1 (self only), no complaints pain at this time. No signs of acute distress noted at this time. Bed in lowest position, bed alarm in place, call light within reach, hourly rounding initiated.

## 2021-04-18 NOTE — PROGRESS NOTES
[facility] -  PROGRESS NOTE               Author: Buddy Michel M.D. Date & Time: 4/18/2021  10:23 AM     HPI:  Patient is a 80 year old male with a PMHx of HTN, HLD, anxiety who presented on 4/13/21 with abdominal pain with progressive jaundice.  Seen by GI in the past for elevated LFTs and ascities but work up with hepatitis panel, and autoimmune including JOSE, actib Ab has been negative.  Abdominal ultrasound of liver with echogenic liver lesion thought to be hemangioma.  In the ER found to have HTN with sodium 132, elevated LFTs and tbili of 24.8.  Calcium 11.6.  Nephrology consulted for possible TLS.     Patient currently confused.    DAILY NEPHROLOGY SUMMARY:  - 4/16 Consult done  4/17 - Remains confused.    4/18 - Remains confused.  Asleep.  Wife at bedside.    REVIEW OF SYSTEMS:    Unable due to acuity    PMH/PSH/SH/FH: Reviewed and unchanged since admission note  CURRENT MEDICATIONS: Reviewed from admission to present day    VS:  /65   Pulse (!) 52   Temp 35.8 °C (96.5 °F) (Temporal)   Resp 20   Ht 1.829 m (6')   Wt 80.9 kg (178 lb 5.6 oz)   SpO2 92%   BMI 24.19 kg/m²   Constitutional: No distress.   HENT:   Head: Normocephalic.   Eyes: Pupils are equal, round, and reactive to light.   Cardiovascular: Normal rate.   Pulmonary/Chest: Effort normal.   Abdominal: Soft. He exhibits distension.   Musculoskeletal:         General: Edema present.      Cervical back: Normal range of motion.   Neurological:   confused   Skin: Skin is warm.     Fluids:  In: -   Out: 75     LABS:  Recent Labs     04/16/21  0353 04/17/21  0547   SODIUM 132* 134*   POTASSIUM 4.6 5.1   CHLORIDE 96 97   CO2 16* 16*   GLUCOSE 115* 82   BUN 74* 95*   CREATININE 0.81 0.40*   CALCIUM 11.0* 11.1*       IMPRESSION:  # Elevated Uric Acid              - Unclear if this is TLS but patient has elevated phos and uric acid, but calcium is also elevated              - On allopurinol allopurinol  # Hypercalcemia               - Corrected calcium is about 12.1              - Continue gentle IVF hydration for now              - Sent for SPEP  # Ascites  # Hyperbilirubinemia              - S/p Liver biopsy  # Elevated troponin  # Transaminitis  # HTN              - Currently BP has been lower              - Hold spironolactone for now              - Consider holding lasix if trending lower  # Acidosis   - Can consider Sodium bicarb drip  # Hyponatremia    - Mild, likely from liver disease     PLAN:  - Can consider allopurinol 200mg IV daily if not oncomfort care  - Continue gentle IVF for hypercalcemia, can consider sodium bicarb drip  - Can consider calcitonin if calcium worsens  - Hold spironolactone given lower Bps  - Continue lasix  - Follow up liver biopsy  - F/u SPEP  - Continue GOC discussions     Thank you for the consultation!

## 2021-04-18 NOTE — PROGRESS NOTES
Gastroenterology Progress Note     Author: CARLIN Carias   Date & Time Created: 4/18/2021 10:02 AM    Chief Complaint:  Jaundice, ascites    History of Presenting Illness  80 y.o. male with HTN, hyperlipidemia, back pain who presented 4/13/2021 with progressive weakness, edema, bloating.     He has been followed by Dr. Mcmillan as outpatient over the past 2 months for elevated liver tests, jaundice, ascites.  Has had extensive evaluation.     He initially developed some abdominal bloating following back surgery 9/2020.  He was started on tizanidine and it was felt this may be contributing so this was stopped around 12/2020.  Denies other new medications, herbal supplements, OTC meds.  He remembers having fevers around time of back surgery but no other symptoms.     Then, in February, his symptom of bloating was increasing.  He had initial labs 2/2021 showing t bili 3.2, , , alk phos 551.  Labs summer 2020 with normal liver tests.  Since then, his LFTs have continued to rise.  Labs now show t bili 20.7, alk phos 353, AST 90, ALT 51, INR 1.4.  He has had intermittent erythrocytosis with elevated Hgb as well as mild hypercalcemia as well.     Prior evaluation has included the following.  Acute viral hepatitis panel negative. , ferritin 165, iron sat 29%, JOSE negative, Actin Ab negative, LKM Ab negative, IgG low at 625, AMA negative, anti sp 100 neg, anti gp 210 negative, alk phos isoenzymes with high liver and bone fractions, A1AT nl, TSh nl, celiac negative, LDH high at 395, haptoglobin low <10, folate nl, B12 high, Vitamin A slightly low, CA 19-9 nl, AFP nl, PSA nl, TB quantiferon negative, PTH normal, lipase normal.  Abdominal US with normal liver with echogenic liver lesion likely hemangioma, ascites, normal spleen.  MRI abdomen with and without shows hepatic hemangioma, normal liver appearance, normal spleen, normal bile ducts.  Possible thickening stomach.  CT scan shows hepatomegaly but  "normal liver appearance and normal portal vein.  Again possible thickening of stomach.  Echocardiogram shows EF 65%, diastolic dysfunction, possible dynamic LVOT, normal RV size and function.  Prior paracentesis with cytology only showing some reactive cells but no definite malignancy.     He denies alcohol or other drug use.  Denies any exposure to farm animals, ticks, other sick contacts.     Interval History:  4/15/2021: Still abdominal discomfort.  Some diarrhea with scant melena.  Very weak and tired.  Labs with rising BUN, calcium and bilirubin.  Paracentesis yesterday without SBP but consistent with portal HTN.  Other labs reuested do not appear to have been drawn.    4/16/2021: Confused after getting sedation for liver biopsy.  Possible abdominal pain.  Labs with rising BUN, calcium, liver tests, INR.  Clinically worsening.  Had transjugular liver biopsy today.    4/17/2021-confused, alert and oriented x1.  Biopsy results still pending.  BUN and bilirubin continue to elevate.  LFTs up as well.  Ammonia level normal.  Elevated anion gap.   Jodie: I talked to the pt, his daughter and wife. Pt's daughter said they decided no more blood tests, comfort care until liver biopsy, NPO for patient for concern of aspiration. When I asked the patient directly what the patient wanted, the daughter was furiously and walked out of the room. Discussed with the wife in mahendra and direct way that the goal should be \"what the patient wants\", not \"what the family wants\". The patient did not provide immediate answer. I encourage the wife and the patient continue to communicate his wishes at this critical moment.     4/18/2021: Patient somnolent almost obtunded today.  Not alert.  Still confused.  It was decided upon yesterday that the patient would be mostly comfort care without additional testing, awaiting liver biopsy results.  No family at bedside currently.    Review  of Systems:  Review of Systems   Unable to perform ROS: " Acuity of condition       Physical Exam:  Physical Exam  Vitals and nursing note reviewed.   Constitutional:       General: He is not in acute distress.     Appearance: He is ill-appearing and toxic-appearing.   Eyes:      General: Scleral icterus present.   Cardiovascular:      Rate and Rhythm: Normal rate and regular rhythm.      Pulses: Normal pulses.      Heart sounds: Normal heart sounds.   Pulmonary:      Breath sounds: Decreased breath sounds present.   Abdominal:      General: Abdomen is flat. Bowel sounds are normal. There is distension.      Palpations: Abdomen is soft. There is no mass.      Tenderness: There is no abdominal tenderness. There is no guarding or rebound.      Hernia: No hernia is present.   Skin:     Coloration: Skin is jaundiced.   Neurological:      Mental Status: He is unresponsive.         Labs:          Recent Labs     04/16/21 0353 04/17/21  0547   SODIUM 132* 134*   POTASSIUM 4.6 5.1   CHLORIDE 96 97   CO2 16* 16*   BUN 74* 95*   CREATININE 0.81 0.40*   MAGNESIUM  --  2.5   PHOSPHORUS 5.4* 5.3*   CALCIUM 11.0* 11.1*     Recent Labs     04/16/21 0353 04/17/21  0547   ALTSGPT 59* 95*   ASTSGOT 110* 210*   ALKPHOSPHAT 328* 352*   TBILIRUBIN 25.5* 30.4*   GLUCOSE 115* 82     Recent Labs     04/15/21  1336 04/15/21  1716 04/15/21  1911 04/16/21  0353 04/17/21  0547   RBC  --    < > 6.25* 5.78 6.49*   HEMOGLOBIN  --    < > 22.0* 20.4* 21.5*   HEMATOCRIT  --    < > 59.5* 55.5* 58.5*   PLATELETCT  --    < > 100* 76* 86*   PROTHROMBTM 20.7*  --   --   --   --    INR 1.72*  --   --   --   --     < > = values in this interval not displayed.     Recent Labs     04/15/21  1716 04/15/21  1716 04/15/21  1911 04/16/21  0353 04/17/21  0547   WBC 10.3   < > 10.5 11.5* 14.2*   NEUTSPOLYS 86.20*  --   --  84.80* 88.10*   LYMPHOCYTES 5.50*  --   --  4.20* 4.30*   MONOCYTES 7.60  --   --  7.60 6.90   EOSINOPHILS 0.00  --   --  3.40 0.00   BASOPHILS 0.20  --   --  0.00 0.30   ASTSGOT  --   --   --  110*  210*   ALTSGPT  --   --   --  59* 95*   ALKPHOSPHAT  --   --   --  328* 352*   TBILIRUBIN  --   --   --  25.5* 30.4*    < > = values in this interval not displayed.     Hemodynamics:  Temp (24hrs), Av.1 °C (97 °F), Min:35.8 °C (96.5 °F), Max:36.9 °C (98.4 °F)  Temperature: 35.8 °C (96.5 °F)  Pulse  Av.4  Min: 52  Max: 117   Blood Pressure : 103/65    Respiratory:    Respiration: 20, Pulse Oximetry: 92 %     Work Of Breathing / Effort: Mild  RUL Breath Sounds: Diminished, RML Breath Sounds: Diminished, RLL Breath Sounds: Diminished, RIAZA Breath Sounds: Diminished, LLL Breath Sounds: Diminished  Fluids:    Intake/Output Summary (Last 24 hours) at 4/15/2021 0910  Last data filed at 2021 1800  Gross per 24 hour   Intake 240 ml   Output 2450 ml   Net -2210 ml     Weight: 80.9 kg (178 lb 5.6 oz)  GI/Nutrition:  No orders of the defined types were placed in this encounter.    Medical Decision Making, by Problem:  Active Hospital Problems    Diagnosis    • Hyperbilirubinemia [E80.6]    • Ascites [R18.8]    • HTN (hypertension) [I10]    • Transaminitis [R74.01]    • Elevated troponin [R77.8]    • Hyponatremia [E87.1]    • Hypercalcemia [E83.52]      EGD Findings (4/15/2021):  1. Diffuse thickening and erythema with scattered small heme and friability to gastric mucosa.  Biopsies obtained.  2. Diffuse edema and erythema with flattening of villi in duodenum.  Biopsies obtained.    Assessment/Plan  79 y/o with HTN, hyperlipidemia, history of prostate cancer with progressive jaundice, ascites with associated erythrocytosis and hypercalcemia of unclear etiology.  Has had extensive evaluation as outpatient without clear cause determined.  Still question paraneoplastic process causing cholestatic hepatitis related to undiagnosed malignancy (Stauffers syndrome) versus DILI versus autoimmune.  Slowly worsening.     PROBLEMS:  1. Jaundice  2. Elevated liver tests  3. Erythrocytosis  4. Hypercalcemia  5. Ascites  6.  Generalized weakness  7. Hyponatremia  8. Azotemia  9. Protein-calorie malnutrition, moderate  10. Hypertension  11. Hyperlipidemia  12. History of prostate cancer    Plan:  1.  Await liver biopsy results.  Gastric and duodenal biopsies nonrevealing.  Negative for celiac disease, malignancy, amyloid  2.  May continue 60 mg IV Solumedrol for potential autoimmune mediated liver process given worsening clinical status, but thus far has not been overly helpful.  Consider stopping at this time.    3.  Appreciate hematology recommendations. Possible lymphoma? May require bone marrow biopsy? Palliative chemo?  4.  Palliative care per patient's family decision  5.  Poor prognosis, recommend palliation and comfort care    GI TO SIGN OFF / STAND BY - Thank you very much for allowing me to participate in the care of your patient.  GI to sign off at this time.  If the patient develops changes in clinical course/decompensation or you have any additional questions or concerns, please don't hesitate to reengage [facility].  If necessary, follow up with [facility] will be arranged  The patient will be called to schedule an appointment time.  If the patient does not receive a call from GI consultants scheduling or they have additional questions, recommend the patient call the clinic at [phone] or [phone] to schedule an appointment.     Quality-Core Measures   Reviewed items::  Medications reviewed, Labs reviewed and Radiology images reviewed

## 2021-04-18 NOTE — PROGRESS NOTES
Oncology/Hematology Progress Note               Author: Carlene Knott M.D. Date & Time created: 4/18/2021  1:21 PM   Diagnosis-?  Tumor lysis  Interval History:  Peritoneal fluid shows no evidence of malignancy.  Wife at the bedside.  Liver biopsy results are still pending.    Review of Systems:  Review of Systems   Unable to perform ROS: Mental acuity       Physical Exam:  Physical Exam  Constitutional:       Appearance: He is ill-appearing and toxic-appearing.   Eyes:      General: Scleral icterus present.   Pulmonary:      Effort: Pulmonary effort is normal.      Breath sounds: Normal breath sounds.   Abdominal:      General: Abdomen is flat.      Comments: Minimal hepatomegaly   Skin:     General: Skin is warm and dry.      Coloration: Skin is jaundiced.         Labs:          Recent Labs     04/16/21 0353 04/17/21  0547   SODIUM 132* 134*   POTASSIUM 4.6 5.1   CHLORIDE 96 97   CO2 16* 16*   BUN 74* 95*   CREATININE 0.81 0.40*   MAGNESIUM  --  2.5   PHOSPHORUS 5.4* 5.3*   CALCIUM 11.0* 11.1*     Recent Labs     04/16/21 0353 04/17/21  0547   ALTSGPT 59* 95*   ASTSGOT 110* 210*   ALKPHOSPHAT 328* 352*   TBILIRUBIN 25.5* 30.4*   GLUCOSE 115* 82     Recent Labs     04/15/21  1336 04/15/21  1716 04/15/21  1911 04/16/21  0353 04/17/21  0547   RBC  --    < > 6.25* 5.78 6.49*   HEMOGLOBIN  --    < > 22.0* 20.4* 21.5*   HEMATOCRIT  --    < > 59.5* 55.5* 58.5*   PLATELETCT  --    < > 100* 76* 86*   PROTHROMBTM 20.7*  --   --   --   --    INR 1.72*  --   --   --   --     < > = values in this interval not displayed.     Recent Labs     04/15/21  1716 04/15/21  1716 04/15/21  1911 04/16/21  0353 04/17/21  0547   WBC 10.3   < > 10.5 11.5* 14.2*   NEUTSPOLYS 86.20*  --   --  84.80* 88.10*   LYMPHOCYTES 5.50*  --   --  4.20* 4.30*   MONOCYTES 7.60  --   --  7.60 6.90   EOSINOPHILS 0.00  --   --  3.40 0.00   BASOPHILS 0.20  --   --  0.00 0.30   ASTSGOT  --   --   --  110* 210*   ALTSGPT  --   --   --  59* 95*   ALKPHOSPHAT   --   --   --  328* 352*   TBILIRUBIN  --   --   --  25.5* 30.4*    < > = values in this interval not displayed.     Recent Labs     21  0353 21  0547   SODIUM 132* 134*   POTASSIUM 4.6 5.1   CHLORIDE 96 97   CO2 16* 16*   GLUCOSE 115* 82   BUN 74* 95*   CREATININE 0.81 0.40*   CALCIUM 11.0* 11.1*     Hemodynamics:  Temp (24hrs), Av.2 °C (97.1 °F), Min:35.8 °C (96.5 °F), Max:36.9 °C (98.4 °F)  Temperature: 35.8 °C (96.5 °F)  Pulse  Av.4  Min: 52  Max: 117   Blood Pressure : 103/65    Respiratory:    Respiration: 20, Pulse Oximetry: 92 %     Work Of Breathing / Effort: Mild  RUL Breath Sounds: Diminished, RML Breath Sounds: Diminished, RLL Breath Sounds: Diminished, RAIZA Breath Sounds: Diminished, LLL Breath Sounds: Diminished  Fluids:    Intake/Output Summary (Last 24 hours) at 2021 1324  Last data filed at 2021 1100  Gross per 24 hour   Intake --   Output 75 ml   Net -75 ml     Weight: 80.9 kg (178 lb 5.6 oz)  GI/Nutrition:  No orders of the defined types were placed in this encounter.    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  No lymphadenopathy or hepatosplenomegaly.  Weight liver biopsy results.  Peritoneal fluid does not show any evidence of malignancy so far  ?  Tumor lysis-nephrology consultation appreciated.  On allopurinol.  Discussed with .Prognosis very poor    Quality-Core Measures

## 2021-04-18 NOTE — THERAPY
Missed Therapy     Patient Name: Adithya Cohn  Age:  80 y.o., Sex:  male  Medical Record #: [id number]  Today's Date: 4/18/2021    Evaluation attempted. Unable to rouse pt to an appropriate level for assessment of swallow. Per RN, pt is possibly going CMO. Will continue to monitor.       04/18/21 0902   Treatment Variance   Reason For Missed Therapy Medical - Patient not Able To Participate   Total Time Spent   Total Time Spent (Mins) 8   Interdisciplinary Plan of Care Collaboration   IDT Collaboration with  Nursing   Session Information   Date / Session Number note only- 4/18 (? comfort care)

## 2021-04-19 NOTE — THERAPY
Physical Therapy Contact Note    Patient Name: Adithya Cohn  Age:  80 y.o., Sex:  male  Medical Record #: [id number]  Today's Date: 4/19/2021    Pt transitioned to comfort care. Will dc PT service. Please re-consult if code status changes    Joanne Madrid PT, DPT

## 2021-04-19 NOTE — DISCHARGE SUMMARY
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 *      History of Presenting Illness and Hospital Course  This is a very pleasant 80 y.o. male with a past medical history of hypertension, hyperlipidemia, anxiety and remote history of prostate cancer admitted 4/13/2021 with progressive liver disease of unknown etiology.  Patient had been followed outpatient by gastroenterology for elevated liver test and a development of ascites.  He has had extensive evaluation including a negative acute viral hepatitis panel, negative autoimmune work-up including JOSE actin AB, anti-sp, and anti-GP.  Abdominal ultrasound with normal liver and echogenic liver lesion likely hemangioma, MRI with and without showing hepatic hemangioma, normal liver appearance, normal spleen and normal bile ducts and possible thickening of the stomach was noted.  He underwent CT scan which showed hepatomegaly but normal appearance with a normal portal vein and again possible thickening of the stomach.  He had an echocardiogram which showed an EF of 65%, diastolic dysfunction, possible dynamic left ventricular outflow, normal RV size and function.  He underwent prior paracentesis with cytology only showing reactive cells but no definitive malignancy.  Patient has no history of alcohol or other drug use and denied any known significant exposures.  On arrival  here patient's vitals were stable however he did have evidence of liver disease with elevated AST//60, alk phos of 464 and a total bili of 24.8.   He underwent EGD  with gastric biopsy as well as transjugular liver biopsy and pressure measurement while here.  Gastric biopsy showed benign gastric mucosa with stromal edema, duodenal biopsy showed benign mucosa with increased vascular congestion there is no evidence of dysplasia or malignancy.   He was treated initially with Lasix and spironolactone for edema and ascites however he continued to decline.  Paracentesis was done and was not suggestive of infection nor malignancy.  He was started on IV Solu-Medrol at 1 point for possible autoimmune or inflammatory etiology.   Patient did develop hypercalcemia as well as elevated uric acid and phosphorus concerning for underlying lymphoproliferative disease, for this oncology was consulted.  They recommended liver biopsy.  Nephrology was also consulted due to concern for possible tumor lysis syndrome.  He was treated with allopurinol and IV fluids and Lasix.   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   Despite treatments patient continued to rapidly decline with worsening liver failure and hepatic encephalopathy.  After discussion with wife,  patient was transitioned to comfort care.   Patient passed peacefully with wife at bedside on 4/19/2021.     Death Date: 04/19/21   Death Time: 1550         Pronounced By (RN1): Zachery Santoyo  Pronounced By (RN2): Temitope To

## 2021-04-19 NOTE — CARE PLAN
Problem: Nutritional:  Goal: Achieve adequate nutritional intake  Description: Patient will consume >50% of meals  Outcome: DISCHARGED-GOAL NOT MET     Problem: Nutritional:  Goal: Nutrition support tolerated and meeting greater than 85% of estimated needs  Outcome: DISCHARGED-GOAL NOT MET   Pt is now comfort care.

## 2021-04-19 NOTE — THERAPY
Missed Therapy     Patient Name: Adithya Cohn  Age:  80 y.o., Sex:  male  Medical Record #: [id number]  Today's Date: 4/19/2021    Discussed missed therapy with RN       04/19/21 1012   Treatment Variance   Reason For Missed Therapy Medical - Other (Please Comment)   Interdisciplinary Plan of Care Collaboration   Collaboration Comments Orders received and acknowledged for a clinical swallow evaluation.  Patient has transitioned to comfort care.  No acute SLP needs.  SLP will complete the order.

## 2021-04-20 LAB — COPPER SERPL-MCNC: 208.5 UG/DL (ref 70–140)

## 2021-04-20 NOTE — PROGRESS NOTES
Received bedside report from RN, pt care assumed, VSS, pt assessment complete. Pt AAOx0 pt nonverbal and reactive to painful stimuli, no evidence of pain at this time. On RA O2, no signs of acute distress noted at this time. Plan of care discussed with pt and verbalizes no questions. Pt currently comfort care. hourly rounding initiated.

## 2021-04-20 NOTE — CARE PLAN
Problem: Communication  Goal: The ability to communicate needs accurately and effectively will improve  4/19/2021 2028 by Temitope To R.N.  Outcome: NOT MET  4/19/2021 2028 by Temitope To R.N.  Outcome: NOT MET     Problem: Communication  Goal: The ability to communicate needs accurately and effectively will improve  4/19/2021 2028 by Temitope To R.N.  Outcome: NOT MET  4/19/2021 2028 by Temitope To R.N.  Outcome: NOT MET     Problem: Safety  Goal: Will remain free from injury  4/19/2021 2028 by Temitope To R.N.  Outcome: PROGRESSING AS EXPECTED  4/19/2021 2028 by Temitope To R.N.  Outcome: PROGRESSING AS EXPECTED  Goal: Will remain free from falls  4/19/2021 2028 by Temitope To R.N.  Outcome: PROGRESSING AS EXPECTED  4/19/2021 2028 by Temitope To R.N.  Outcome: PROGRESSING AS EXPECTED     Problem: Infection  Goal: Will remain free from infection  Outcome: PROGRESSING AS EXPECTED

## 2021-04-23 LAB — JAK2 P.V617F BLD/T QL: NOT DETECTED

## 2021-04-27 LAB — GENE XXX MUT ANL BLD/T: NOT DETECTED

## 2021-04-28 LAB — MPL P.W515 BLD/T QL: NOT DETECTED

## 2021-05-14 LAB
HSV1 GG IGG SER-ACNC: 0.36 IV
HSV1+2 IGG SER IA-ACNC: 4.45 IV
HSV2 GG IGG SER-ACNC: 0.1 IV

## 2021-05-15 LAB
CMV IGG SERPL IA-ACNC: <0.2 U/ML
CMV IGM SERPL IA-ACNC: <8 AU/ML
EBV EA-D IGG SER-ACNC: <5 U/ML (ref 0–10.9)
EBV NA IGG SER IA-ACNC: 511 U/ML (ref 0–21.9)
EBV VCA IGG SER IA-ACNC: >750 U/ML (ref 0–21.9)

## (undated) DEVICE — GLOVE SURGICAL PROTEXIS PI 8.0 LF - (50PR/BX)

## (undated) DEVICE — TUBE E-T HI-LO CUFF 7.5MM (10EA/PK)

## (undated) DEVICE — SUCTION INSTRUMENT YANKAUER BULBOUS TIP W/O VENT (50EA/CA)

## (undated) DEVICE — ELECTRODE DUAL RETURN W/ CORD - (50/PK)

## (undated) DEVICE — ELECTRODE 850 FOAM ADHESIVE - HYDROGEL RADIOTRNSPRNT (50/PK)

## (undated) DEVICE — SLEEVE, VASO, THIGH, MED

## (undated) DEVICE — PROTECTOR ULNA NERVE - (36PR/CA)

## (undated) DEVICE — CANISTER SUCTION 3000ML MECHANICAL FILTER AUTO SHUTOFF MEDI-VAC NONSTERILE LF DISP  (40EA/CA)

## (undated) DEVICE — NEEDLE SPINAL NON-SAFETY 18 GA X 3 IN (25EA/BX)

## (undated) DEVICE — KIT ROOM DECONTAMINATION

## (undated) DEVICE — GOWN WARMING STANDARD FLEX - (30/CA)

## (undated) DEVICE — MASK ANESTHESIA ADULT  - (100/CA)

## (undated) DEVICE — LACTATED RINGERS INJ. 500 ML - (24EA/CA)

## (undated) DEVICE — KIT ANESTHESIA W/CIRCUIT & 3/LT BAG W/FILTER (20EA/CA)

## (undated) DEVICE — SYRINGE DISP. 60 CC LL - (30/BX, 12BX/CA)**WHEN THESE ARE GONE ORDER #500206**

## (undated) DEVICE — CANISTER SUCTION RIGID RED 1500CC (40EA/CA)

## (undated) DEVICE — PACK NEURO - (2EA/CA)

## (undated) DEVICE — SPHERE NAVIGATION STEALTH (5EA/TY 12TY/PK)

## (undated) DEVICE — SODIUM CHL IRRIGATION 0.9% 1000ML (12EA/CA)

## (undated) DEVICE — CONTAINER, SPECIMEN, STERILE

## (undated) DEVICE — DRAPE STRLE REG TOWEL 18X24 - (10/BX 4BX/CA)"

## (undated) DEVICE — TRAY CATHETER FOLEY URINE METER W/STATLOCK 350ML (10EA/CA)

## (undated) DEVICE — NEPTUNE 4 PORT MANIFOLD - (20/PK)

## (undated) DEVICE — IMMOBILIZER KNEE 20 INCH - (1/EA)

## (undated) DEVICE — GLOVE BIOGEL PI INDICATOR SZ 8.0 SURGICAL PF LF -(50/BX 4BX/CA)

## (undated) DEVICE — LENS/HOOD FOR SPACESUIT - (32/PK) PEEL AWAY FACE

## (undated) DEVICE — WATER IRRIGATION STERILE 1000ML (12EA/CA)

## (undated) DEVICE — SENSOR SPO2 NEO LNCS ADHESIVE (20/BX) SEE USER NOTES

## (undated) DEVICE — SUTURE 2-0 VICRYL PLUS SH - 8 X 18 INCH (12/BX)

## (undated) DEVICE — GLOVE BIOGEL PI ORTHO SZ 7.5 PF LF (40PR/BX)

## (undated) DEVICE — ARMREST CRADLE FOAM - (2PR/PK 12PR/CA)

## (undated) DEVICE — SUTURE GENERAL

## (undated) DEVICE — TOOL DISSECTING BALL SYMMETRI MIDAS REX LEGEND 14CM 4MM

## (undated) DEVICE — TUBING CLEARLINK DUO-VENT - C-FLO (48EA/CA)

## (undated) DEVICE — TIP INTPLS HFLO ML ORFC BTRY - (12/CS)  FOR SURGILAV

## (undated) DEVICE — HUMID-VENT HEAT AND MOISTURE EXCHANGE- (50/BX)

## (undated) DEVICE — BONE MILL BM210

## (undated) DEVICE — SUTURE MONOCRYL CT-2 VIO - (36/BX)

## (undated) DEVICE — KIT CUSTOM PROCEDURE SINGLE FOR ENDO  (15/CA)

## (undated) DEVICE — DRESSING POST OP BORDER 4 X 10 (5EA/BX)

## (undated) DEVICE — GLOVE BIOGEL SZ 8 SURGICAL PF LTX - (50PR/BX 4BX/CA)

## (undated) DEVICE — SYRINGE SAFETY 10 ML 18 GA X 1 1/2 BLUNT LL (100/BX 4BX/CA)

## (undated) DEVICE — BITE BLOCK ADULT 60FR (100EA/CA)

## (undated) DEVICE — SET EXTENSION WITH 2 PORTS (48EA/CA) ***PART #2C8610 IS A SUBSTITUTE*****

## (undated) DEVICE — HEAD HOLDER JUNIOR/ADULT

## (undated) DEVICE — DRAPE LARGE 3 QUARTER - (20/CA)

## (undated) DEVICE — GLOVE BIOGEL PI ORTHO SZ 6 1/2 SURGICAL PF LF (40PR/BX)

## (undated) DEVICE — GLOVE BIOGEL INDICATOR SZ 8 SURGICAL PF LTX - (50/BX 4BX/CA)

## (undated) DEVICE — SUTURE 1 ETHIBOND CTX C/R 8-1 - (12/BX)

## (undated) DEVICE — SOD. CHL. INJ. 0.9% 1000 ML - (14EA/CA 60CA/PF)

## (undated) DEVICE — LACTATED RINGERS INJ 1000 ML - (14EA/CA 60CA/PF)

## (undated) DEVICE — DRAPE 36X28IN RAD CARM BND BG - (25/CA) O

## (undated) DEVICE — SUTURE 4-0 MONOCRYL PLUS PS-1 - 27 INCH (36/BX)

## (undated) DEVICE — GLOVE BIOGEL SZ 6.5 SURGICAL PF LTX (50PR/BX 4BX/CA)

## (undated) DEVICE — SET SUCT.W/SLEEVE VIA-GUARD - (10/BX10BX/CA)

## (undated) DEVICE — HANDPIECE 10FT INTPLS SCT PLS IRRIGATION HAND CONTROL SET (6/PK)

## (undated) DEVICE — COVER MAYO STAND X-LG - (22EA/CA)

## (undated) DEVICE — SET LEADWIRE 5 LEAD BEDSIDE DISPOSABLE ECG (1SET OF 5/EA)

## (undated) DEVICE — PACK JACKSON TABLE KIT W/OUT - HR (6EA/CA)

## (undated) DEVICE — Device

## (undated) DEVICE — CHLORAPREP 26 ML APPLICATOR - ORANGE TINT(25/CA)

## (undated) DEVICE — SODIUM CHL. INJ. 0.9% 500ML (24EA/CA 50CA/PF)

## (undated) DEVICE — GLOVE, LITE (PAIR)

## (undated) DEVICE — FILM CASSETTE ENDO

## (undated) DEVICE — KIT SURGIFLO W/OUT THROMBIN - (6EA/CA)

## (undated) DEVICE — BOVIE BLADE COATED &INSULATED - 25/PK

## (undated) DEVICE — FORCEP RADIAL JAW 4 STANDARD CAPACITY W/NEEDLE 240CM (40EA/BX)

## (undated) DEVICE — TRAY SKIN SCRUB PVP WET (20EA/CA) PART #DYND70356 DISCONTINUED

## (undated) DEVICE — MIDAS LUBRICATOR DIFFUSER PACK (4EA/CA)

## (undated) DEVICE — INTRAOP NEURO IN OR 1:1 PER 15 MIN

## (undated) DEVICE — DRESSING 3X8 ADAPTIC GAUZE - NON-ADHERING (36/PK 6PK/BX)

## (undated) DEVICE — DRILL BIT

## (undated) DEVICE — PACK TOTAL HIP - (1/CA)

## (undated) DEVICE — TUBE CONNECTING SUCTION - CLEAR PLASTIC STERILE 72 IN (50EA/CA)

## (undated) DEVICE — HEADREST PRONEVIEW LARGE - (10/CA)

## (undated) DEVICE — SODIUM CHL. IRRIGATION 0.9% 3000ML (4EA/CA 65CA/PF)

## (undated) DEVICE — TOOL DISSECT MATCH HEAD

## (undated) DEVICE — BLADE RECIP 77.5 X 11.2 X .76MM (1/EA)

## (undated) DEVICE — DRAPE LAPAROTOMY T SHEET - (12EA/CA)

## (undated) DEVICE — SUTURE 0 VICRYL PLUS CT-1 - 8 X 18 INCH (12/BX)

## (undated) DEVICE — GLOVE BIOGEL PI INDICATOR SZ 6.5 SURGICAL PF LF - (50/BX 4BX/CA)

## (undated) DEVICE — TUBING C&T SET FLYING LEADS DRAIN TUBING (10EA/BX)